# Patient Record
Sex: MALE | Race: WHITE | NOT HISPANIC OR LATINO | Employment: OTHER | URBAN - METROPOLITAN AREA
[De-identification: names, ages, dates, MRNs, and addresses within clinical notes are randomized per-mention and may not be internally consistent; named-entity substitution may affect disease eponyms.]

---

## 2017-01-03 RX ORDER — IBUPROFEN 600 MG/1
600 TABLET ORAL EVERY 8 HOURS PRN
Qty: 90 TAB | Refills: 2 | Status: SHIPPED | OUTPATIENT
Start: 2017-01-03 | End: 2017-03-30 | Stop reason: SDUPTHER

## 2017-01-03 RX ORDER — IBUPROFEN 800 MG/1
TABLET ORAL
Qty: 90 TAB | Refills: 0 | OUTPATIENT
Start: 2017-01-03

## 2017-01-30 RX ORDER — IBUPROFEN 800 MG/1
TABLET ORAL
Refills: 0 | OUTPATIENT
Start: 2017-01-30

## 2017-02-28 RX ORDER — TEMAZEPAM 30 MG/1
CAPSULE ORAL
Qty: 30 CAP | Refills: 0 | Status: SHIPPED
Start: 2017-02-28 | End: 2017-04-01 | Stop reason: SDUPTHER

## 2017-02-28 NOTE — TELEPHONE ENCOUNTER
Covering for Krishan Fernandez this week. Received refill request for temazepam and tylenol #4.  Pt was seen in clinic recently and is taking medications for insomnia and chronic pain. Refill sent to pharmacy for one month only to last until patient has appointment with PCP Krishan Fernandez to assess for tolerance with these medications.     Lit Arthur M.D.

## 2017-03-16 LAB
CHOLEST SERPL-MCNC: 158 MG/DL (ref 100–199)
COMMENT 011824: NORMAL
HDLC SERPL-MCNC: 47 MG/DL
LDLC SERPL CALC-MCNC: 81 MG/DL (ref 0–99)
TRIGL SERPL-MCNC: 148 MG/DL (ref 0–149)
VLDLC SERPL CALC-MCNC: 30 MG/DL (ref 5–40)

## 2017-03-17 LAB — HEMOCCULT STL QL IA: POSITIVE

## 2017-03-20 DIAGNOSIS — R19.5 POSITIVE FIT (FECAL IMMUNOCHEMICAL TEST): ICD-10-CM

## 2017-03-22 ENCOUNTER — HOSPITAL ENCOUNTER (OUTPATIENT)
Dept: RADIOLOGY | Facility: MEDICAL CENTER | Age: 57
End: 2017-03-22
Attending: NURSE PRACTITIONER
Payer: MEDICARE

## 2017-03-22 ENCOUNTER — OFFICE VISIT (OUTPATIENT)
Dept: MEDICAL GROUP | Facility: PHYSICIAN GROUP | Age: 57
End: 2017-03-22
Payer: MEDICARE

## 2017-03-22 VITALS
HEIGHT: 70 IN | SYSTOLIC BLOOD PRESSURE: 148 MMHG | BODY MASS INDEX: 33.5 KG/M2 | TEMPERATURE: 97.2 F | HEART RATE: 91 BPM | WEIGHT: 234 LBS | RESPIRATION RATE: 16 BRPM | DIASTOLIC BLOOD PRESSURE: 82 MMHG | OXYGEN SATURATION: 97 %

## 2017-03-22 DIAGNOSIS — G89.29 CHRONIC MIDLINE LOW BACK PAIN WITHOUT SCIATICA: ICD-10-CM

## 2017-03-22 DIAGNOSIS — M54.50 CHRONIC MIDLINE LOW BACK PAIN WITHOUT SCIATICA: ICD-10-CM

## 2017-03-22 DIAGNOSIS — E66.9 OBESITY (BMI 30.0-34.9): ICD-10-CM

## 2017-03-22 DIAGNOSIS — Z79.891 ENCOUNTER FOR LONG-TERM OPIATE ANALGESIC USE: ICD-10-CM

## 2017-03-22 DIAGNOSIS — Z79.891 CHRONIC USE OF OPIATE DRUGS THERAPEUTIC PURPOSES: ICD-10-CM

## 2017-03-22 PROCEDURE — G8484 FLU IMMUNIZE NO ADMIN: HCPCS | Performed by: NURSE PRACTITIONER

## 2017-03-22 PROCEDURE — 4004F PT TOBACCO SCREEN RCVD TLK: CPT | Performed by: NURSE PRACTITIONER

## 2017-03-22 PROCEDURE — G8432 DEP SCR NOT DOC, RNG: HCPCS | Performed by: NURSE PRACTITIONER

## 2017-03-22 PROCEDURE — G8419 CALC BMI OUT NRM PARAM NOF/U: HCPCS | Performed by: NURSE PRACTITIONER

## 2017-03-22 PROCEDURE — 72100 X-RAY EXAM L-S SPINE 2/3 VWS: CPT

## 2017-03-22 PROCEDURE — 72202 X-RAY EXAM SI JOINTS 3/> VWS: CPT

## 2017-03-22 PROCEDURE — 99214 OFFICE O/P EST MOD 30 MIN: CPT | Performed by: NURSE PRACTITIONER

## 2017-03-22 RX ORDER — HYDROCODONE BITARTRATE AND ACETAMINOPHEN 7.5; 325 MG/1; MG/1
1 TABLET ORAL EVERY 8 HOURS PRN
Qty: 90 TAB | Refills: 0 | Status: SHIPPED | OUTPATIENT
Start: 2017-03-22 | End: 2017-04-18

## 2017-03-22 ASSESSMENT — LIFESTYLE VARIABLES: HISTORY_ALCOHOL_USE: 0

## 2017-03-22 ASSESSMENT — ENCOUNTER SYMPTOMS: DEPRESSION: 0

## 2017-03-22 NOTE — ASSESSMENT & PLAN NOTE
Last dose of narcotic medication: Tylenol #4 last dose was 3-4 days ago. He ran out because he was taking 2 tabs sometimes due to his increasing pain. States a friend gave him oxycodone and he has been taking this for two days.     Analgesia: 5-7/10  Activity level: good  Adverse events: none  Aberrant behavior: none  Affect and mood: calm and pleasant    Nonnarcotic treatments that are being used: NSAIDs, ibuprofen   Pain management agreement initiated and signed on:  Most recent urine drug screen done never    Opiate risk score: 7    Total daily opiate dosage: morphine 36 mEq

## 2017-03-22 NOTE — PROGRESS NOTES
Subjective:     Chief Complaint   Patient presents with   • Follow-Up     4 months       HPI  Andrews Sanchez is a 56 y.o. male here today for routine f/u on chronic pain management. He is concerned today about worsening pain.   Chronic low back pain  Long-standing problem with history of back surgery. It is getting worse over this past two months. He reports that he believes that a screw is becoming loose and feels like it is going to poke through his flesh around the site of surgery. He believes that this was aggravated by walking up and down stairs with moving. Denies any bowel or bladder changes. Denies any fevers, chills, or weight loss. No LE weakness, numbness, or tingling. Treatments tried include him deciding to increase his pain medication without discussion with myself and taking a friend's pain medication for the past 2 days    Chronic use of opiate drugs therapeutic purposes  Last dose of narcotic medication: Tylenol #4 last dose was 3-4 days ago. He ran out because he was taking 2 tabs sometimes due to his increasing pain. States a friend gave him oxycodone and he has been taking this for two days.     Analgesia: 5-7/10  Activity level: good  Adverse events: none  Aberrant behavior: none  Affect and mood: calm and pleasant    Nonnarcotic treatments that are being used: NSAIDs, ibuprofen   Pain management agreement initiated and signed on:  Most recent urine drug screen done never    Opiate risk score: 7    Total daily opiate dosage: morphine 36 mEq        Obesity (BMI 30.0-34.9)  Patient is very active and rides his bicycle around town or walks for exercise. States that he is very healthy for the most part with a diet high in fruits, vegetables, fish, and poultry. Reports occasionally eating out.         Diagnoses of Chronic midline low back pain without sciatica, Chronic use of opiate drugs therapeutic purposes, Encounter for long-term opiate analgesic use, and Obesity (BMI 30.0-34.9) were  "pertinent to this visit.    Allergies: Review of patient's allergies indicates no known allergies.  Current medicines (including changes today)  Current Outpatient Prescriptions   Medication Sig Dispense Refill   • [START ON 3/28/2017] acetaminophen-codeine #4 (TYLENOL #4) 300-60 MG Tab Take 1 Tab by mouth every 6 hours as needed. 120 Tab 2   • hydrocodone-acetaminophen (NORCO) 7.5-325 MG per tablet Take 1 Tab by mouth every 8 hours as needed. 90 Tab 0   • temazepam (RESTORIL) 30 MG capsule NOT COVERED TAKE 1 CAPSULE EVERY DAY AT BEDTIME AS NEEDED FOR SLEEP (DISCONTINUE AMBIEN) 30 Cap 0   • ibuprofen (MOTRIN) 600 MG Tab Take 1 Tab by mouth every 8 hours as needed for Inflammation. 90 Tab 2   • simvastatin (ZOCOR) 40 MG Tab Take 1 Tab by mouth every evening. 90 Tab 3   • amlodipine (NORVASC) 5 MG Tab Take 1 Tab by mouth every day. 90 Tab 3     No current facility-administered medications for this visit.       He  has a past medical history of Hypercholesteremia; Hypertension; and Muscle disorder.      ROS  As stated in HPI      Objective:     Blood pressure 148/82, pulse 91, temperature 36.2 °C (97.2 °F), resp. rate 16, height 1.778 m (5' 10\"), weight 106.142 kg (234 lb), SpO2 97 %. Body mass index is 33.58 kg/(m^2).  Physical Exam:  General: Alert, oriented, in no acute distress.  Eye contact is good, speech goal directed, affect talkative but calm  HEENT: pupils equally round and 2+mm in size   Spine without deformity and no significant curvature on forward bend. Thoracic and lumbar paraspinous muscles without tenderness or spasm. No spinous process tenderness. Positive SI joint tenderness on right side. Full hip ROM bilat. SLT negative. DTR 1+ patella, 1+ achilles. Strength 5/5 prox and distal LE. Sensation intact to light touch and pin prick. Able to perform flexion, extension, and lateral bend without difficulty. Gait steady.   Ext: no edema, normal color and temperature.     3/22/2017 2:13 PM    HISTORY/REASON " FOR EXAM:  Low back pain for one month.      TECHNIQUE/ EXAM DESCRIPTION AND NUMBER OF VIEWS:  3 views of the lumbar spine.    COMPARISON: Intraoperative views of the lumbar spine 3/7/2008    FINDINGS:  There is minimal upper lumbar scoliosis convex right.  Posterior fusion hardware with bilateral pedicular screws, paired posterior rods, and one posterior transverse bar are present from L4 through S1.  There are old appearing anterior wedge compression deformities from T10 through T12. There is diffuse disc space narrowing with vacuum disc phenomenon at T12-L1.  There is diffuse disc space narrowing at L4-5.    There is no acute abnormality.  There is symmetric degenerative change of each SI joint.              Impression             1.  Posterior fusion hardware in place from L4 through S1 with bilateral laminectomy defects at L4 and L5.    2.  No acute compression fracture.    3.  Degenerative change of the T12-L1 disc.           3/22/2017 2:14 PM    HISTORY/REASON FOR EXAM:  Low back pain for one month.      TECHNIQUE/EXAM DESCRIPTION AND NUMBER OF VIEWS:  3 view(s) of the sacroiliac joints.    COMPARISON: Lumbar spine series same date    FINDINGS:  There are no fractures or dislocations.  No blastic or lytic lesions.  The sacral neural arches are intact.  There is symmetric degenerative change of each SI joint.  There is no evidence of sacroiliitis.              Impression             Mild symmetric degenerative change of each SI joint.  No acute sacral fracture identified               Assessment and Plan:   The following treatment plan was discussed  1. Chronic midline low back pain without sciatica  Not controlled at this time. We will obtain x-rays to evaluate the spine and postsurgical hardware. I will increase patient's pain medication temporarily until we determine the cause of the worsening pain. After this for weeks, we will go back to Tylenol No. 4 for chronic pain management     DX-LUMBAR SPINE-2 OR 3  VIEWS    DX-SACROILIAC JOINTS 3+    hydrocodone-acetaminophen (NORCO) 7.5-325 MG per tablet   2. Chronic use of opiate drugs therapeutic purposes  UDS provided today. We will need to repeat this before any further pain medication is prescribed as I had to review the pain contract with patient again today in regards to using someone else's narcotics.  Obtained and reviewed the patient utilization report state pharmacy database on 3/22/2017.  Based on assessment of report prescription for Norco is medically necessary.   [facility] PAIN MANAGEMENT SCREEN   3. Encounter for long-term opiate analgesic use  Same as #2   4. Obesity (BMI 30.0-34.9)  Patient identified as having weight management issue.  Appropriate orders and counseling given.       Followup: Return in about 3 months (around 6/22/2017) for short pain med refill. sooner should new symptoms or problems arise.

## 2017-03-22 NOTE — ASSESSMENT & PLAN NOTE
Long-standing problem with history of back surgery. It is getting worse over this past two months. He reports that he believes that a screw is becoming loose and feels like it is going to poke through his flesh around the site of surgery. He believes that this was aggravated by walking up and down stairs with moving. Denies any bowel or bladder changes. Denies any fevers, chills, or weight loss. No LE weakness, numbness, or tingling. Treatments tried include him deciding to increase his pain medication without discussion with myself and taking a friend's pain medication for the past 2 days

## 2017-03-23 NOTE — ASSESSMENT & PLAN NOTE
Patient is very active and rides his bicycle around town or walks for exercise. States that he is very healthy for the most part with a diet high in fruits, vegetables, fish, and poultry. Reports occasionally eating out.

## 2017-03-30 RX ORDER — IBUPROFEN 600 MG/1
TABLET ORAL
Qty: 90 TAB | Refills: 2 | Status: SHIPPED | OUTPATIENT
Start: 2017-03-30 | End: 2017-05-18 | Stop reason: SDUPTHER

## 2017-03-30 RX ORDER — TEMAZEPAM 30 MG/1
CAPSULE ORAL
Qty: 30 CAP | Refills: 0 | OUTPATIENT
Start: 2017-03-30

## 2017-04-04 ENCOUNTER — TELEPHONE (OUTPATIENT)
Dept: MEDICAL GROUP | Facility: PHYSICIAN GROUP | Age: 57
End: 2017-04-04

## 2017-04-04 RX ORDER — TEMAZEPAM 30 MG/1
CAPSULE ORAL
Qty: 30 CAP | Refills: 0 | Status: SHIPPED
Start: 2017-04-04 | End: 2017-05-12 | Stop reason: SDUPTHER

## 2017-04-04 NOTE — TELEPHONE ENCOUNTER
Pt. Notified.  He will come prior to his next fill for new UDS.  He is needing his Temazepam refilled please.

## 2017-04-04 NOTE — TELEPHONE ENCOUNTER
Pt states that codeine works much better than Norco.  He is wanting to change back to that one.  Please advise.

## 2017-04-04 NOTE — TELEPHONE ENCOUNTER
Please let patient know that I am happy to hear that Tylenol No. 4 works better than [place], but his drug screen was very inconsistent. I am aware that he had used a friend's oxycodone, therefore we need a repeat drug screen for this purpose as well as there was positive illicit substance. Patient needs to come to clinic today or tomorrow for urine drug screen before any further medication can be prescribed.    GHADA Woods.

## 2017-04-05 NOTE — TELEPHONE ENCOUNTER
Please let patient know I have sent over temazepam. If he is taking Sudafed routinely, please clarify whether he is using it daily, and when his last dose was (also add to med list then) so that we can be sure to have this information for drug screen as Sudafed can cause positive amphetamine.    GHADA Woods.

## 2017-04-14 DIAGNOSIS — G89.29 CHRONIC MIDLINE LOW BACK PAIN WITHOUT SCIATICA: ICD-10-CM

## 2017-04-14 DIAGNOSIS — M54.50 CHRONIC MIDLINE LOW BACK PAIN WITHOUT SCIATICA: ICD-10-CM

## 2017-04-14 NOTE — TELEPHONE ENCOUNTER
Was the patient seen in the last year in this department? Yes     Does patient have an active prescription for medications requested? No     Received Request Via: Patient     I've

## 2017-04-17 NOTE — TELEPHONE ENCOUNTER
Was the patient seen in the last year in this department? Yes  3/22/17    Does patient have an active prescription for medications requested? No     Received Request Via: Pharmacy

## 2017-04-18 RX ORDER — TEMAZEPAM 30 MG/1
CAPSULE ORAL
Qty: 30 CAP | Refills: 0
Start: 2017-04-18

## 2017-04-18 NOTE — TELEPHONE ENCOUNTER
Called Rx into pharmacy   prescription called into   [id number] - [place], [place] - [address] AT [place] [address] [place] [address]  Phone: [phone] Fax: [phone]

## 2017-04-30 RX ORDER — TEMAZEPAM 30 MG/1
CAPSULE ORAL
Qty: 30 CAP | Refills: 0
Start: 2017-04-30

## 2017-05-12 RX ORDER — TEMAZEPAM 30 MG/1
CAPSULE ORAL
Qty: 30 CAP | Refills: 0 | Status: SHIPPED
Start: 2017-05-12 | End: 2017-07-18

## 2017-05-18 DIAGNOSIS — I10 ESSENTIAL HYPERTENSION: ICD-10-CM

## 2017-05-18 RX ORDER — AMLODIPINE BESYLATE 5 MG/1
5 TABLET ORAL DAILY
Qty: 90 TAB | Refills: 3 | Status: SHIPPED | OUTPATIENT
Start: 2017-05-18

## 2017-05-18 RX ORDER — IBUPROFEN 600 MG/1
TABLET ORAL
Qty: 90 TAB | Refills: 2 | Status: SHIPPED | OUTPATIENT
Start: 2017-05-18 | End: 2017-06-14 | Stop reason: SDUPTHER

## 2017-06-14 RX ORDER — IBUPROFEN 600 MG/1
TABLET ORAL
Qty: 90 TAB | Refills: 2 | Status: SHIPPED | OUTPATIENT
Start: 2017-06-14 | End: 2017-11-05 | Stop reason: SDUPTHER

## 2017-06-14 RX ORDER — TEMAZEPAM 30 MG/1
CAPSULE ORAL
Qty: 30 CAP | Refills: 0
Start: 2017-06-14

## 2017-06-14 NOTE — TELEPHONE ENCOUNTER
Please inform patient that I have received a refill request for his pain medication and sleep aid. His most recent drug screen showed positive for methamphetamine. We send this out for them to test whether this was from Sudafed or from an illicit substance, and it shows positive for illicit substance. Drug screen also showed that patient had taken some form of oxycodone, which has never been prescribed to him. Both of these go against a pain contract. The patient would like any more refills for medications, he needs an appointment to discuss his urine drug screen results to come up with plan.    GHADA Woods.

## 2017-07-18 ENCOUNTER — OFFICE VISIT (OUTPATIENT)
Dept: MEDICAL GROUP | Facility: PHYSICIAN GROUP | Age: 57
End: 2017-07-18
Payer: MEDICARE

## 2017-07-18 VITALS
RESPIRATION RATE: 16 BRPM | DIASTOLIC BLOOD PRESSURE: 78 MMHG | HEIGHT: 70 IN | OXYGEN SATURATION: 94 % | SYSTOLIC BLOOD PRESSURE: 130 MMHG | HEART RATE: 125 BPM | BODY MASS INDEX: 32.35 KG/M2 | TEMPERATURE: 98.4 F | WEIGHT: 226 LBS

## 2017-07-18 DIAGNOSIS — Z79.891 CHRONIC USE OF OPIATE DRUGS THERAPEUTIC PURPOSES: ICD-10-CM

## 2017-07-18 DIAGNOSIS — M54.50 CHRONIC MIDLINE LOW BACK PAIN WITHOUT SCIATICA: ICD-10-CM

## 2017-07-18 DIAGNOSIS — R82.5 POSITIVE URINE DRUG SCREEN: ICD-10-CM

## 2017-07-18 DIAGNOSIS — E78.2 MIXED HYPERLIPIDEMIA: ICD-10-CM

## 2017-07-18 DIAGNOSIS — I10 ESSENTIAL HYPERTENSION: ICD-10-CM

## 2017-07-18 DIAGNOSIS — R19.5 OCCULT BLOOD POSITIVE STOOL: ICD-10-CM

## 2017-07-18 DIAGNOSIS — G89.29 CHRONIC MIDLINE LOW BACK PAIN WITHOUT SCIATICA: ICD-10-CM

## 2017-07-18 PROCEDURE — 99213 OFFICE O/P EST LOW 20 MIN: CPT | Performed by: NURSE PRACTITIONER

## 2017-07-18 RX ORDER — OXYCODONE AND ACETAMINOPHEN 10; 325 MG/1; MG/1
1-2 TABLET ORAL EVERY 4 HOURS PRN
COMMUNITY
End: 2018-01-11

## 2017-07-18 RX ORDER — PSEUDOEPHEDRINE HCL 30 MG
60 TABLET ORAL EVERY 4 HOURS PRN
COMMUNITY
End: 2018-01-11

## 2017-07-18 RX ORDER — ACETAMINOPHEN 500 MG
500-1000 TABLET ORAL EVERY 6 HOURS PRN
COMMUNITY

## 2017-07-18 RX ORDER — NAPROXEN SODIUM 220 MG
220 TABLET ORAL 2 TIMES DAILY WITH MEALS
COMMUNITY
End: 2017-07-18

## 2017-07-18 ASSESSMENT — PATIENT HEALTH QUESTIONNAIRE - PHQ9
5. POOR APPETITE OR OVEREATING: 0 - NOT AT ALL
SUM OF ALL RESPONSES TO PHQ QUESTIONS 1-9: 9
CLINICAL INTERPRETATION OF PHQ2 SCORE: 3

## 2017-07-18 ASSESSMENT — PAIN SCALES - GENERAL: PAINLEVEL: 8=MODERATE-SEVERE PAIN

## 2017-07-18 NOTE — ASSESSMENT & PLAN NOTE
Last dose of narcotic medication: Tylenol #4 last 7 weeks ago, Percocet this morning, states Sudafed last dose Sunday, last dose of Restoril 7 weeks ago, last dose of Morphine 4 days ago. He also just saw the dentist 7/13/17 and was put to sleep with (Fentanyl, Midazolam, Ketamine) cocktail.     Aberrant behavior: Continues to tell me that he is taking Sudafed and not using illicit substance methamphetamine  Affect and mood: Talkative, slightly anxious, but pleasant    Nonnarcotic treatments that are being used: NSAIDs and exercise  Pain management agreement initiated and signed on: June 2016  Most recent urine drug screen done May 2017, and is not consistent with prescribed medications; inconsistent with oxycodone and positive for methamphetamine

## 2017-07-18 NOTE — ASSESSMENT & PLAN NOTE
Long-standing problem with history of back surgery that has progressively become worse. Patient states that he feels a screw is pressing on him in his low back, and this was previously managed with Tylenol No. 4, but unfortunately a urine drug screen came out positive for methamphetamine so I have not been providing patient with pain management. He states muscle relaxers do not work. Denies any bowel or bladder changes, fever, chills

## 2017-07-18 NOTE — ASSESSMENT & PLAN NOTE
Chronic problem well controlled on current medications. Does not monitor blood pressure at home. Denies any severe headache, dizziness, vision changes, chest pain, [place], palpitations, or lower extremity edema

## 2017-07-18 NOTE — MR AVS SNAPSHOT
"        Andrews Sanchez   2017 12:00 PM   Office Visit   MRN: [id number]    Department:  [facility]   Dept Phone:  [phone]    Description:  Male : 1960   Provider:  TONIA Woods           Reason for Visit     Follow-Up 4 months       Allergies as of 2017     No Known Allergies      You were diagnosed with     Chronic midline low back pain without sciatica   [5177940]       Chronic use of opiate drugs therapeutic purposes   [5899998]       Essential hypertension   [3025597]       Mixed hyperlipidemia   [272.2.ICD-9-CM]       Occult blood positive stool   [939852]         Vital Signs     Blood Pressure Pulse Temperature Respirations Height Weight    130/78 mmHg 125 36.9 °C (98.4 °F) 16 1.778 m (5' 10\") 102.513 kg (226 lb)    Body Mass Index Oxygen Saturation Smoking Status             32.43 kg/m2 94% Current Some Day Smoker         Basic Information     Date Of Birth Sex Race Ethnicity Preferred Language    1960 Male White Non- English      Your appointments     2017 12:00 PM   Established Patient with TONIA Woods   [facility]    [address] [place] [id number] [phone]           You will be receiving a confirmation call a few days before your appointment from our automated call confirmation system.              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 Review all of your home medications and newly ordered medications with your provider and/or pharmacist. Follow medication instructions as directed by your provider and/or pharmacist. Please keep your medication list with you and share with your provider. Update the information when medications are discontinued, doses are changed, or new medications (including over-the-counter products) are added; and carry medication information at all times in the event of emergency situations     Allergies:  No Known Allergies          Medications  Valid as of: July 18, 2017 - 12:56 PM    Generic Name Brand Name Tablet Size Instructions for use    Acetaminophen (Tab) TYLENOL 500 MG Take 500-1,000 mg by mouth every 6 hours as needed.        AmLODIPine Besylate (Tab) NORVASC 5 MG Take 1 Tab by mouth every day.        Ibuprofen (Tab) MOTRIN 600 MG TAKE 1 TABLET BY MOUTH EVERY 8 HOURS AS NEEDED FOR INFLAMMATION.        Oxycodone-Acetaminophen (Tab) PERCOCET-10  MG Take 1-2 Tabs by mouth every four hours as needed for Severe Pain.        Pseudoephedrine HCl (Tab) SUDAFED 30 MG Take 60 mg by mouth every four hours as needed for Congestion.        Simvastatin (Tab) ZOCOR 40 MG Take 1 Tab by mouth every evening.        .                 Medicines prescribed today were sent to:     [id number] - [place] - [address] AT [address] [place] [address]    Phone: [phone] Fax: [phone]    Open 24 Hours?: No      Medication refill instructions:       If your prescription  bottle indicates you have medication refills left, it is not necessary to call your provider’s office. Please contact your pharmacy and they will refill your medication.    If your prescription bottle indicates you do not have any refills left, you may request refills at any time through one of the following ways: The online [facility] system (except Urgent Care), by calling your provider’s office, or by asking your pharmacy to contact your provider’s office with a refill request. Medication refills are processed only during regular business hours and may not be available until the next business day. Your provider may request additional information or to have a follow-up visit with you prior to refilling your medication.   *Please Note: Medication refills are assigned a new Rx number when refilled electronically. Your pharmacy may indicate that no refills were authorized even though a new prescription for the same medication is available at the pharmacy. Please request the medicine by name with the pharmacy before contacting your provider for a refill.        Your To Do List     Future Labs/Procedures Complete By Angella Joy PAIN MANAGEMENT SCREEN  As directed 7/18/2018    Comments:    Current Meds (name, sig, last dose):   Current outpatient prescriptions:   •  acetaminophen, 500-1,000 mg, Oral, Q6HRS PRN  •  naproxen, 220 mg, Oral, BID WITH MEALS  •  oxycodone-acetaminophen, 1-2 Tab, Oral, Q4HRS PRN  •  pseudoephedrine, 60 mg, Oral, Q4HRS PRN  •  ibuprofen, TAKE 1 TABLET BY MOUTH EVERY 8 HOURS AS NEEDED FOR INFLAMMATION., 7/18/2017  •  amlodipine, 5 mg, Oral, DAILY, 7/18/2017  •  temazepam, TAKE 1 CAPSULE BY MOUTH ONCE DAILY AT BEDTIME AS NEEDED FOR SLEEP, 7/18/2017  •  simvastatin, 40 mg, Oral, Q EVENING, 7/18/2017  •  acetaminophen-codeine #4, TAKE 1 TABLET BY MOUTH EVERY 6 HOURS AS NEEDED, not taking          OCCULT BLOOD FECES IMMUNOASSAY  As directed 7/18/2018         [facility] Access Code: Activation  code not generated  Current MyChart Status: Active          Quit Tobacco Information     Do you want to quit using tobacco?    Quitting tobacco decreases risks of cancer, heart and lung disease, increases life expectancy, improves sense of taste and smell, and increases spending money, among other benefits.    If you are thinking about quitting, we can help.  • [facility] Quit Tobacco Program: [phone]  o Program occurs weekly for four weeks and includes pharmacist consultation on products to support quitting smoking or chewing tobacco. A provider referral is needed for pharmacist consultation.  • Tobacco Users Help Hotline: 0-296-QUIT-NOW (403-9544) or https://[place].quitlogix.org/  o Free, confidential telephone and online coaching for [place] residents. Sessions are designed on a schedule that is convenient for you. Eligible clients receive free nicotine replacement therapy.  • Nationally: www.smokefree.gov  o Information and professional assistance to support both immediate and long-term needs as you become, and remain, a non-smoker. Smokefree.gov allows you to choose the help that best fits your needs.

## 2017-09-15 ENCOUNTER — HOSPITAL ENCOUNTER (EMERGENCY)
Facility: MEDICAL CENTER | Age: 57
End: 2017-09-15
Attending: EMERGENCY MEDICINE
Payer: MEDICARE

## 2017-09-15 VITALS
WEIGHT: 225 LBS | BODY MASS INDEX: 32.21 KG/M2 | SYSTOLIC BLOOD PRESSURE: 146 MMHG | RESPIRATION RATE: 17 BRPM | OXYGEN SATURATION: 96 % | TEMPERATURE: 97.3 F | DIASTOLIC BLOOD PRESSURE: 84 MMHG | HEIGHT: 70 IN | HEART RATE: 86 BPM

## 2017-09-15 DIAGNOSIS — T78.40XA ALLERGIC REACTION, INITIAL ENCOUNTER: ICD-10-CM

## 2017-09-15 PROCEDURE — 96372 THER/PROPH/DIAG INJ SC/IM: CPT

## 2017-09-15 PROCEDURE — 700111 HCHG RX REV CODE 636 W/ 250 OVERRIDE (IP): Performed by: EMERGENCY MEDICINE

## 2017-09-15 PROCEDURE — 700105 HCHG RX REV CODE 258: Performed by: EMERGENCY MEDICINE

## 2017-09-15 PROCEDURE — 36415 COLL VENOUS BLD VENIPUNCTURE: CPT

## 2017-09-15 PROCEDURE — 99284 EMERGENCY DEPT VISIT MOD MDM: CPT

## 2017-09-15 PROCEDURE — 94760 N-INVAS EAR/PLS OXIMETRY 1: CPT

## 2017-09-15 PROCEDURE — 96374 THER/PROPH/DIAG INJ IV PUSH: CPT

## 2017-09-15 PROCEDURE — 96361 HYDRATE IV INFUSION ADD-ON: CPT

## 2017-09-15 PROCEDURE — 96375 TX/PRO/DX INJ NEW DRUG ADDON: CPT

## 2017-09-15 PROCEDURE — 700111 HCHG RX REV CODE 636 W/ 250 OVERRIDE (IP)

## 2017-09-15 RX ORDER — ONDANSETRON 2 MG/ML
4 INJECTION INTRAMUSCULAR; INTRAVENOUS ONCE
Status: DISCONTINUED | OUTPATIENT
Start: 2017-09-15 | End: 2017-09-15 | Stop reason: HOSPADM

## 2017-09-15 RX ORDER — EPINEPHRINE 0.3 MG/.3ML
0.3 INJECTION SUBCUTANEOUS ONCE
Qty: 0.3 ML | Refills: 3 | Status: SHIPPED | OUTPATIENT
Start: 2017-09-15 | End: 2017-09-15

## 2017-09-15 RX ORDER — DIPHENHYDRAMINE HYDROCHLORIDE 50 MG/ML
50 INJECTION INTRAMUSCULAR; INTRAVENOUS ONCE
Status: COMPLETED | OUTPATIENT
Start: 2017-09-15 | End: 2017-09-15

## 2017-09-15 RX ORDER — PREDNISONE 10 MG/1
TABLET ORAL
Qty: 32 TAB | Refills: 0 | Status: SHIPPED | OUTPATIENT
Start: 2017-09-15 | End: 2018-01-11

## 2017-09-15 RX ORDER — DIPHENHYDRAMINE HCL 25 MG
25 CAPSULE ORAL EVERY 6 HOURS PRN
Qty: 30 CAP | Refills: 0 | Status: SHIPPED | OUTPATIENT
Start: 2017-09-15 | End: 2018-01-11

## 2017-09-15 RX ORDER — EPINEPHRINE 1 MG/ML
0.5 INJECTION INTRAMUSCULAR; INTRAVENOUS; SUBCUTANEOUS ONCE
Status: COMPLETED | OUTPATIENT
Start: 2017-09-15 | End: 2017-09-15

## 2017-09-15 RX ORDER — ONDANSETRON 2 MG/ML
4 INJECTION INTRAMUSCULAR; INTRAVENOUS ONCE
Status: COMPLETED | OUTPATIENT
Start: 2017-09-15 | End: 2017-09-15

## 2017-09-15 RX ORDER — EPINEPHRINE 1 MG/ML
INJECTION INTRAMUSCULAR; INTRAVENOUS; SUBCUTANEOUS
Status: COMPLETED
Start: 2017-09-15 | End: 2017-09-15

## 2017-09-15 RX ORDER — SODIUM CHLORIDE 9 MG/ML
1000 INJECTION, SOLUTION INTRAVENOUS ONCE
Status: COMPLETED | OUTPATIENT
Start: 2017-09-15 | End: 2017-09-15

## 2017-09-15 RX ORDER — EPINEPHRINE 1 MG/ML(1)
0.5 AMPUL (ML) INJECTION ONCE
Status: DISCONTINUED | OUTPATIENT
Start: 2017-09-15 | End: 2017-09-15

## 2017-09-15 RX ORDER — FAMOTIDINE 20 MG/1
20 TABLET, FILM COATED ORAL 2 TIMES DAILY
Qty: 20 TAB | Refills: 0 | Status: SHIPPED | OUTPATIENT
Start: 2017-09-15 | End: 2017-09-25

## 2017-09-15 RX ORDER — METHYLPREDNISOLONE SODIUM SUCCINATE 125 MG/2ML
125 INJECTION, POWDER, LYOPHILIZED, FOR SOLUTION INTRAMUSCULAR; INTRAVENOUS ONCE
Status: COMPLETED | OUTPATIENT
Start: 2017-09-15 | End: 2017-09-15

## 2017-09-15 RX ADMIN — DIPHENHYDRAMINE HYDROCHLORIDE 50 MG: 50 INJECTION, SOLUTION INTRAMUSCULAR; INTRAVENOUS at 08:55

## 2017-09-15 RX ADMIN — SODIUM CHLORIDE 1000 ML: 9 INJECTION, SOLUTION INTRAVENOUS at 08:56

## 2017-09-15 RX ADMIN — ONDANSETRON 4 MG: 2 INJECTION INTRAMUSCULAR; INTRAVENOUS at 09:00

## 2017-09-15 RX ADMIN — FAMOTIDINE 20 MG: 10 INJECTION INTRAVENOUS at 09:00

## 2017-09-15 RX ADMIN — EPINEPHRINE 0.5 MG: 1 INJECTION INTRAMUSCULAR; INTRAVENOUS; SUBCUTANEOUS at 09:05

## 2017-09-15 RX ADMIN — EPINEPHRINE 0.5 MG: 1 INJECTION INTRAMUSCULAR; INTRAVENOUS; SUBCUTANEOUS at 09:15

## 2017-09-15 RX ADMIN — METHYLPREDNISOLONE SODIUM SUCCINATE 125 MG: 125 INJECTION, POWDER, FOR SOLUTION INTRAMUSCULAR; INTRAVENOUS at 08:56

## 2017-09-15 NOTE — ED PROVIDER NOTES
ED Provider Note    Scribed for Maribell Huizar M.D. by Tam Valle. 9/15/2017  8:49 AM    Primary care provider: TONIA Woods  Means of arrival: Ambulance  History obtained from: Patient  History limited by: None    CHIEF COMPLAINT  Allergic Reaction     HPI  Andrews Sanchez is a 57 y.o. male with a history of hypertension who presents to the Emergency Department for acute onset allergic reaction, onset one hour prior to arrival in the  ED. The patient reports that a wasp bit his lip. His lips then suddenly swelled and he developed diffuse hives. The patient reports slight trouble breathing. He has not taken any medications for his symptoms. He denies any vomiting. Associated symptoms include nausea.     REVIEW OF SYSTEMS  HEENT:  No ear pain, congestion, or sore throat. Positive lip swelling, lip pain, and facial swelling.   EYES: no discharge, redness, or vision changes  CARDIAC: no chest pain, no palpitations    PULMONARY: Positive shortness of breath.   GI: Positive nausea, no abdominal pain.   Neuro: no weakness or headache  Musculoskeletal: no swelling, deformity, pain, or joint swelling  Endocrine: no fevers, sweating, or weight loss   SKIN: positive rash, erythema,no contusions     See history of present illness. All other systems are negative  C.     PAST MEDICAL HISTORY   has a past medical history of Hypercholesteremia; Hypertension; and Muscle disorder.    SURGICAL HISTORY   has a past surgical history that includes laminotomy (1990).    SOCIAL HISTORY  Social History   Substance Use Topics   • Smoking status: Current Some Day Smoker     Packs/day: 0.20     Years: 16.00     Types: Cigarettes   • Smokeless tobacco: Never Used      Comment: smokes 4-5 cigs/day    • Alcohol use No      History   Drug Use No     FAMILY HISTORY  Family History   Problem Relation Age of Onset   • Other Mother      heart stopped from morphine   • Hypertension Father    • Other Father      aortic aneurysm    • Lung  "Disease Father      smoker     CURRENT MEDICATIONS  No current facility-administered medications on file prior to encounter.      Current Outpatient Prescriptions on File Prior to Encounter   Medication Sig Dispense Refill   • acetaminophen (TYLENOL) 500 MG Tab Take 500-1,000 mg by mouth every 6 hours as needed.     • oxycodone-acetaminophen (PERCOCET-10)  MG Tab Take 1-2 Tabs by mouth every four hours as needed for Severe Pain.     • pseudoephedrine (SUDAFED) 30 MG Tab Take 60 mg by mouth every four hours as needed for Congestion.     • ibuprofen (MOTRIN) 600 MG Tab TAKE 1 TABLET BY MOUTH EVERY 8 HOURS AS NEEDED FOR INFLAMMATION. 90 Tab 2   • amlodipine (NORVASC) 5 MG Tab Take 1 Tab by mouth every day. 90 Tab 3   • simvastatin (ZOCOR) 40 MG Tab Take 1 Tab by mouth every evening. 90 Tab 3     ALLERGIES  No Known Allergies    PHYSICAL EXAM  VITAL SIGNS: /84   Pulse (!) 120   Temp 36.3 °C (97.3 °F) (Temporal)   Resp 18   Ht 1.778 m (5' 10\")   Wt 102.1 kg (225 lb)   SpO2 97%   BMI 32.28 kg/m²     Constitutional: Well developed, Well nourished, uncomfortable Non-toxic appearance.   HEENT: Atraumatic, external ears normal, pharynx pink,  Mucous membranes moist, No rhinorrhea. Marked Lower lip swelling noted.no swelling at base of tongue  Eyes: PERRL, EOMI, Conjunctiva normal, No discharge.   Neck: Normal range of motion, No tenderness, Supple, No stridor.   Lymphatic: No lymphadenopathy    Cardiovascular: Regular Rate and Rhythm, No murmurs,  rubs, or gallops.   Thorax & Lungs: Lungs clear to auscultation bilaterally, No respiratory distress, No wheezes, rhales or rhonchi, No chest wall tenderness. No stridor or tripoding. Patient is speaking full sentences.   Abdomen: Bowel sounds normal, Soft, non tender, non distended,  No pulsatile masses., no rebound guarding or peritoneal signs.   Skin: Warm, Dry, diffuse hives.   Back:  No CVA tenderness,  No spinal tenderness, bony crepitance, step offs, or " instability.   Neurologic: Alert & oriented x 3, Normal motor function, Normal sensory function, No focal deficits noted. Normal reflexes. Normal Cranial Nerves.  Extremities: Equal, intact distal pulses, No cyanosis, clubbing or edema,  No tenderness.   Musculoskeletal: Good range of motion in all major joints. No tenderness to palpation or major deformities noted.     DIAGNOSTIC STUDIES / PROCEDURES  None    COURSE & MEDICAL DECISION MAKING  Nursing notes, VS, PMSFHx reviewed in chart.    8:49 AM - Patient seen and examined at bedside. Patient will be treated with Solumedrol, Benadryl , Pepcid, Epi, and Zofran. The differential diagnoses include but are not limited to: allergic reaction, anaphylaxis. Patient treated with 1L of IV fluids for anaphylactic symptoms.     9:00 AM - Patient became nauseous. He was treated with Zofran.     9:21 AM - Recheck with the patient. His hives are gone and his lip swelling is improving. I discussed with him the treatment plan which includes being discharged on Solumedrol, Benadryl, and with Epi pins. He understands the treatment plan and verbalizes agreement. He will return to the ED with any new or worsening symptoms.    10:01 AM - I reassessed the patient. His rash is still gone at this time and his swelling has almost disappeared. Patient understands the treatment plan and verbalizes agreement.      The patient will return for new or worsening symptoms and is stable at the time of discharge.    CRITICAL CARE  The very real possibility of a deterioration of this patient's condition required the highest level of my preparedness for sudden, emergent intervention.  I provided critical care services, which included medication orders, frequent reevaluations of the patient's condition and response to treatment, ordering and reviewing test results, and discussing the case with various consultants.  The critical care time associated with the care of the patient was 35 minutes. Review  chart for interventions. This time is exclusive of any other billable procedures.     DISPOSITION:  Patient will be discharged home in stable condition.    FOLLOW UP:  TONIA Woods  [address] [address] [phone]    Call in 1 day  for recheck, As needed, If symptoms worsen    [facility], Emergency Dept  [address]  [place] [address] [phone]    As needed, If symptoms worsen    OUTPATIENT MEDICATIONS:  Discharge Medication List as of 9/15/2017 10:05 AM      START taking these medications    Details   predniSONE (DELTASONE) 10 MG Tab Take 4 tabs (40 mg) po daily on days 1-3, then take 3 tabs (30 mg) po daily on days 4-6, then take 2 tabs (20 mg) po daily on days 7-9, then take 1 tab (10 mg) po daily on days 10-12, then take 1/2 tab (5 mg) po daily on days 13-15.Disp-32 Tab, R-0      EPINEPHrine (EPIPEN) 0.3 MG/0.3ML Solution Auto-injector solution for injection 0.3 mL by Intramuscular route Once for 1 dose., Disp-0.3 mL, R-3, Print Rx Paper      famotidine (PEPCID) 20 MG Tab Take 1 Tab by mouth 2 times a day for 10 days., Disp-20 Tab, R-0, Normal      diphenhydrAMINE (BENADRYL) 25 MG capsule Take 1 Cap by mouth every 6 hours as needed., Disp-30 Cap, R-0, Normal           FINAL IMPRESSION  1. Allergic reaction, initial encounter       Tam DOS SANTOS (Scribe), am scribing for, and in the presence of, Maribell Huizar M.D..    Electronically signed by: Tam Valle ([place]), 9/15/2017    Maribell DOS SANTOS M.D. personally performed the services described in this documentation, as scribed by Tam Valle in my presence, and it is both accurate and complete.    The note accurately reflects work and decisions made by me.  Maribell Huizar  9/15/2017  3:36 PM

## 2017-09-15 NOTE — ED NOTES
Patient got stung once on the lip by a wasp.  Noted swelling to the face, came to the ER.  Severe swelling noted to the bottom lip and oral area.  Able to speak full sentences.  Vitals stable.  ERP notified.  Patient medicated as ordered.  IV placed.  Will monitor.

## 2017-09-15 NOTE — DISCHARGE INSTRUCTIONS
Allergies  An allergy is when your body reacts to a substance in a way that is not normal. An allergic reaction can happen after you:  · Eat something.  · Breathe in something.  · Touch something.  WHAT KINDS OF ALLERGIES ARE THERE?  You can be allergic to:  · Things that are only around during certain seasons, like molds and pollens.  · Foods.  · Drugs.  · Insects.  · Animal dander.  WHAT ARE SYMPTOMS OF ALLERGIES?  · Puffiness (swelling). This may happen on the lips, face, tongue, mouth, or throat.  · Sneezing.  · Coughing.  · Breathing loudly (wheezing).  · Stuffy nose.  · Tingling in the mouth.  · A rash.  · Itching.  · Itchy, red, puffy areas of skin (hives).  · Watery eyes.  · Throwing up (vomiting).  · Watery poop (diarrhea).  · Dizziness.  · Feeling faint or fainting.  · Trouble breathing or swallowing.  · A tight feeling in the chest.  · A fast heartbeat.  HOW ARE ALLERGIES DIAGNOSED?  Allergies can be diagnosed with:  · A medical and family history.  · Skin tests.  · Blood tests.  · A food diary. A food diary is a record of all the foods, drinks, and symptoms you have each day.  · The results of an elimination diet. This diet involves making sure not to eat certain foods and then seeing what happens when you start eating them again.  HOW ARE ALLERGIES TREATED?  There is no cure for allergies, but allergic reactions can be treated with medicine. Severe reactions usually need to be treated at a hospital.   HOW CAN REACTIONS BE PREVENTED?  The best way to prevent an allergic reaction is to avoid the thing you are allergic to. Allergy shots and medicines can also help prevent reactions in some cases.     This information is not intended to replace advice given to you by your health care provider. Make sure you discuss any questions you have with your health care provider.     Document Released: 04/14/2014 Document Revised: 01/08/2016 Document Reviewed: 09/29/2015  [employer] Interactive Patient Education ©2016  [email or web address]    Insect Sting Allergy  An insect sting can cause pain, redness, and itching at the sting site. Symptoms of an allergic reaction are usually contained in the area of the sting site (localized). An allergic reaction usually occurs within minutes of an insect sting. Redness and swelling of the sting site may last as long as 1 week.  SYMPTOMS   · A local reaction at the sting site can cause:   · Pain.   · Redness.   · Itching.   · Swelling.   · A systemic reaction can cause a reaction anywhere on your body. For example, you may develop the following:   · Hives.   · Generalized swelling.   · Body aches.   · Itching.   · Dizziness.   · Nausea or vomiting.   · A more serious (anaphylactic) reaction can involve:   · Difficulty breathing or wheezing.   · Tongue or throat swelling.   · Fainting.   HOME CARE INSTRUCTIONS   · If you are stung, look to see if the stinger is still in the skin. This can appear as a small, black dot at the sting site. The stinger can be removed by scraping it with a dull object such as a credit card or your fingernail. Do not use tweezers. Tweezers can squeeze the stinger and release more insect venom into the skin.   · After the stinger has been removed, wash the sting site with soap and water or rubbing alcohol.   · Put ice on the sting area.   · Put ice in a plastic bag.   · Place a towel between your skin and the bag.   · Leave the ice on for 15-20 minutes, 3-4 times a day.   · You can use a topical anti-itch cream, such as hydrocortisone cream, to help reduce itching.   · You can take an oral antihistamine medicine to help decrease swelling and other symptoms.   · Only take over-the-counter or prescription medicines for pain, discomfort, or fever as directed by your caregiver.   · If prescribed, keep an epinephrine injection to temporarily treat emergency allergic reactions with you at all times. It is important to know how and when to give an epinephrine injection.   · Avoid  contact with stinging insects or the insect thought to have caused your reaction.   · Wear long pants when mowing grass or hiking. Wear gloves when gardening.   · Use unscented deodorant and avoid strong perfumes when outdoors.   · Wear a medical alert bracelet or necklace that describes your allergies.   · Make sure your primary caregiver has a record of your insect sting reaction.   · It may be helpful to consult with an allergy specialist. You may have other sensitivities that you are not aware of.   SEEK IMMEDIATE MEDICAL CARE IF:  · You experience wheezing or difficulty breathing.   · You have difficulty swallowing, or you develop throat tightness.   · You have mouth, tongue, or throat swelling.   · You feel weak, or you faint.   · You have coughing or a change in your voice.   · You experience vomiting, diarrhea, or stomach cramps.   · You have chest pain or lightheadedness.   · You notice raised, red patches on the skin that itch.   These may be early warning signs of a serious generalized or anaphylactic reaction. Call your local emergency services (911 in [place] immediately.  MAKE SURE YOU:   · Understand these instructions.   · Will watch your condition.   · Will get help right away if you are not doing well or get worse.   FOR MORE INFORMATION  American Academy of Allergy Asthma and Immunology: www.aaaai.org  American College of Allergy, Asthma and Immunology: www.acaai.org  Document Released: 11/16/2007 Document Revised: 03/11/2013 Document Reviewed: 12/28/2010  [employer]® Patient Information ©2013 [employer].

## 2017-09-27 RX ORDER — PREDNISONE 10 MG/1
TABLET ORAL
Qty: 32 TAB | Refills: 0 | OUTPATIENT
Start: 2017-09-27

## 2017-09-30 DIAGNOSIS — E78.5 HYPERLIPIDEMIA: ICD-10-CM

## 2017-10-03 RX ORDER — SIMVASTATIN 20 MG
20 TABLET ORAL EVERY EVENING
Qty: 90 TAB | Refills: 3 | Status: SHIPPED | OUTPATIENT
Start: 2017-10-03 | End: 2018-11-08 | Stop reason: SDUPTHER

## 2017-10-03 RX ORDER — SIMVASTATIN 40 MG
TABLET ORAL
Qty: 90 TAB | Refills: 3 | OUTPATIENT
Start: 2017-10-03

## 2017-11-06 RX ORDER — IBUPROFEN 600 MG/1
TABLET ORAL
Qty: 90 TAB | Refills: 2 | Status: SHIPPED | OUTPATIENT
Start: 2017-11-06 | End: 2018-04-30 | Stop reason: SDUPTHER

## 2017-11-15 ENCOUNTER — TELEPHONE (OUTPATIENT)
Dept: MEDICAL GROUP | Facility: PHYSICIAN GROUP | Age: 57
End: 2017-11-15

## 2017-11-15 DIAGNOSIS — Z00.00 ROUTINE HEALTH MAINTENANCE: ICD-10-CM

## 2017-11-15 DIAGNOSIS — I10 ESSENTIAL HYPERTENSION: ICD-10-CM

## 2017-11-15 DIAGNOSIS — Z12.5 SCREENING FOR PROSTATE CANCER: ICD-10-CM

## 2017-11-15 DIAGNOSIS — E78.2 MIXED HYPERLIPIDEMIA: ICD-10-CM

## 2017-11-15 DIAGNOSIS — E55.9 VITAMIN D INSUFFICIENCY: ICD-10-CM

## 2018-01-11 ENCOUNTER — OFFICE VISIT (OUTPATIENT)
Dept: MEDICAL GROUP | Facility: PHYSICIAN GROUP | Age: 58
End: 2018-01-11
Payer: MEDICARE

## 2018-01-11 VITALS
BODY MASS INDEX: 31.5 KG/M2 | OXYGEN SATURATION: 96 % | RESPIRATION RATE: 16 BRPM | WEIGHT: 220 LBS | SYSTOLIC BLOOD PRESSURE: 122 MMHG | HEART RATE: 92 BPM | TEMPERATURE: 95.7 F | HEIGHT: 70 IN | DIASTOLIC BLOOD PRESSURE: 94 MMHG

## 2018-01-11 DIAGNOSIS — M54.50 CHRONIC MIDLINE LOW BACK PAIN WITHOUT SCIATICA: ICD-10-CM

## 2018-01-11 DIAGNOSIS — G89.29 CHRONIC MIDLINE LOW BACK PAIN WITHOUT SCIATICA: ICD-10-CM

## 2018-01-11 DIAGNOSIS — I10 ESSENTIAL HYPERTENSION: ICD-10-CM

## 2018-01-11 DIAGNOSIS — R82.5 POSITIVE URINE DRUG SCREEN: ICD-10-CM

## 2018-01-11 PROCEDURE — 99213 OFFICE O/P EST LOW 20 MIN: CPT | Performed by: NURSE PRACTITIONER

## 2018-01-11 RX ORDER — NAPROXEN SODIUM 220 MG
220 TABLET ORAL 2 TIMES DAILY WITH MEALS
COMMUNITY
End: 2018-04-30

## 2018-01-11 NOTE — PROGRESS NOTES
Subjective:     Chief Complaint   Patient presents with   • Chronic Opiate Therapy   • Back Pain   • Hypertension       HPI  Andrews Sanchez is a 57 y.o. male here today for the following:    Positive urine drug screen  Patient has had May and July 2017 + UDS with methamphetamine.   Reports hx of meth use, but promises he has not used it for >1 year. He believes he smokes cigarettes from a friend who uses meth and believes it transfers this way to him.     Essential hypertension  Chronic problem well controlled on current medications. Does not monitor blood pressure at home, but states he gives plasma and his bp elevated after decreasing cholesterol medication from 20 mg to 10 mg. Denies any severe headache, dizziness, vision changes, chest pain, [place], palpitations, or lower extremity edema      Chronic low back pain  Long-standing problem with history of back surgery and patient reporting the screw has since then caused pain. Previously managed with Tylenol #4 the patient has had positive drug screens for methamphetamine, therefore had not been prescribing pain medication. He reports that he has been buying this on his own because the pain is not manageable without medication       Diagnoses of Essential hypertension, Positive urine drug screen, and Chronic midline low back pain without sciatica were pertinent to this visit.    Allergies: Patient has no known allergies.  Current medicines (including changes today)  Current Outpatient Prescriptions   Medication Sig Dispense Refill   • naproxen (ALEVE) 220 MG tablet Take 220 mg by mouth 2 times a day, with meals.     • ibuprofen (MOTRIN) 600 MG Tab TAKE 1 TABLET BY MOUTH EVERY 8 HOURS AS NEEDED FOR INFLAMMATION. 90 Tab 2   • simvastatin (ZOCOR) 20 MG Tab Take 1 Tab by mouth every evening. 90 Tab 3   • amlodipine (NORVASC) 5 MG Tab Take 1 Tab by mouth every day. 90 Tab 3   • acetaminophen (TYLENOL) 500 MG Tab Take 500-1,000 mg by mouth every 6 hours as needed.    "    No current facility-administered medications for this visit.        He  has a past medical history of Hypercholesteremia; Hypertension; and Muscle disorder.      ROS  As stated in HPI      Objective:     Blood pressure 122/94, pulse 92, temperature (!) 35.4 °C (95.7 °F), resp. rate 16, height 1.778 m (5' 10\"), weight 99.8 kg (220 lb), SpO2 96 %. Body mass index is 31.57 kg/m².  Physical Exam:  General: Alert, oriented, in no acute distress.  Eye contact is good, speech goal directed, affect slightly hyperactive, but improved from his baseline   CNs grossly intact.  Gross hearing intact.  Gait steady.     Assessment and Plan:   Assessment/Plan:  1. Essential hypertension  Slightly increased today. Patient refuses change in medication at this time. Continue to monitor. Goal bp <140/90    2. Positive urine drug screen  [facility]; Future    3. Chronic midline low back pain without sciatica  Not controlled but unable to prescribe any medication until UDS results back        Follow up:  Return pending UDS results.    Educated in proper administration of medication(s) ordered today including safety, possible SE, risks, benefits, rationale and alternatives to therapy.   Supportive care, differential diagnoses, and indications for immediate follow-up discussed with patient.    Pathogenesis of diagnosis discussed including typical length and natural progression.    Instructed to return to clinic or nearest emergency department for any change in condition, further concerns, or worsening of symptoms.  Patient states understanding of the plan of care and discharge instructions.      Please note that this dictation was created using voice recognition software. I have made every reasonable attempt to correct obvious errors, but I expect that there are errors of grammar and possibly content that I did not discover before finalizing the note.    Followup: Return pending UDS results. sooner should new " symptoms or problems arise.

## 2018-01-11 NOTE — ASSESSMENT & PLAN NOTE
Long-standing problem with history of back surgery and patient reporting the screw has since then caused pain. Previously managed with Tylenol #4 the patient has had positive drug screens for methamphetamine, therefore had not been prescribing pain medication. He reports that he has been buying this on his own because the pain is not manageable without medication

## 2018-01-11 NOTE — ASSESSMENT & PLAN NOTE
Patient has had May and July 2017 + UDS with methamphetamine.   Reports hx of meth use, but promises he has not used it for >1 year. He believes he smokes cigarettes from a friend who uses meth and believes it transfers this way to him.

## 2018-01-16 RX ORDER — TEMAZEPAM 30 MG/1
CAPSULE ORAL
Qty: 30 CAP | Refills: 0
Start: 2018-01-16

## 2018-01-19 ENCOUNTER — TELEPHONE (OUTPATIENT)
Dept: MEDICAL GROUP | Facility: PHYSICIAN GROUP | Age: 58
End: 2018-01-19

## 2018-01-19 NOTE — TELEPHONE ENCOUNTER
We will call him for a random drug screen. When we call him, he will need to show up within 24 hours of the call.     GHADA Mcdermott.

## 2018-01-19 NOTE — TELEPHONE ENCOUNTER
----- Message from TONIA Mcdermott sent at 1/17/2018  1:28 PM PST -----  Please inform patient his urine drug screen is still showing positive for methamphetamine. I can treat his chronic medical conditions, but I cannot give pain medication or sleep aides any longer.     TONIA Mcdermott

## 2018-01-31 ENCOUNTER — TELEPHONE (OUTPATIENT)
Dept: MEDICAL GROUP | Facility: PHYSICIAN GROUP | Age: 58
End: 2018-01-31

## 2018-01-31 NOTE — TELEPHONE ENCOUNTER
Please inform patient that he has to come in tomorrow (Thursday) for urine drug screen.     GHAAD Mcdermott.

## 2018-01-31 NOTE — TELEPHONE ENCOUNTER
----- Message from TONIA Mcdermott sent at 1/19/2018  1:25 PM PST -----  Call in for RDS within 24 hours of telephone call

## 2018-02-02 ENCOUNTER — OFFICE VISIT (OUTPATIENT)
Dept: MEDICAL GROUP | Facility: PHYSICIAN GROUP | Age: 58
End: 2018-02-02
Payer: MEDICARE

## 2018-02-02 VITALS
HEIGHT: 70 IN | BODY MASS INDEX: 32.21 KG/M2 | HEART RATE: 99 BPM | TEMPERATURE: 98.1 F | SYSTOLIC BLOOD PRESSURE: 150 MMHG | WEIGHT: 225 LBS | DIASTOLIC BLOOD PRESSURE: 102 MMHG | OXYGEN SATURATION: 96 %

## 2018-02-02 DIAGNOSIS — Z79.891 CHRONIC USE OF OPIATE DRUGS THERAPEUTIC PURPOSES: ICD-10-CM

## 2018-02-02 DIAGNOSIS — Z23 NEED FOR VACCINATION: ICD-10-CM

## 2018-02-02 DIAGNOSIS — R82.5 POSITIVE URINE DRUG SCREEN: ICD-10-CM

## 2018-02-02 ASSESSMENT — PAIN SCALES - GENERAL: PAINLEVEL: 10=SEVERE PAIN

## 2018-02-03 DIAGNOSIS — Z23 NEED FOR VACCINATION: ICD-10-CM

## 2018-02-04 NOTE — PROGRESS NOTES
Patient came in to clinic today for a random drug screen. I did not personally evaluate the patient. This visit should not be charged for a provider visit    Assessment and Plan:   Assessment/Plan:  1. Chronic use of opiate drugs therapeutic purposes  - [facility]; Future    2. Positive urine [facility] screen  [facility]; [facility]    3. Need for vaccination  I have placed the below orders and discussed them with an approved delegating provider. The MA is performing the below orders under the direction of Dr. Fisher  - INFLUENZA VACCINE QUAD INJ >3Y(PF)

## 2018-02-05 ENCOUNTER — TELEPHONE (OUTPATIENT)
Dept: MEDICAL GROUP | Facility: PHYSICIAN GROUP | Age: 58
End: 2018-02-05

## 2018-02-07 RX ORDER — TEMAZEPAM 30 MG/1
CAPSULE ORAL
Qty: 30 CAP | Refills: 0
Start: 2018-02-07

## 2018-02-08 ENCOUNTER — TELEPHONE (OUTPATIENT)
Dept: MEDICAL GROUP | Facility: PHYSICIAN GROUP | Age: 58
End: 2018-02-08

## 2018-02-08 DIAGNOSIS — M54.50 CHRONIC MIDLINE LOW BACK PAIN WITHOUT SCIATICA: ICD-10-CM

## 2018-02-08 DIAGNOSIS — G47.01 INSOMNIA DUE TO MEDICAL CONDITION: ICD-10-CM

## 2018-02-08 DIAGNOSIS — G89.29 CHRONIC MIDLINE LOW BACK PAIN WITHOUT SCIATICA: ICD-10-CM

## 2018-02-08 RX ORDER — TEMAZEPAM 30 MG/1
30 CAPSULE ORAL NIGHTLY PRN
Qty: 30 CAP | Refills: 0 | Status: SHIPPED
Start: 2018-02-08 | End: 2018-03-10

## 2018-02-08 NOTE — TELEPHONE ENCOUNTER
Please inform patient his urine drug screen did come back negative for methamphetamine. Please tell him that I will send him a one-month supply of pain medication. Per contract, he may not use any illegal substances, take any narcotic or benzodiazepine medication from anyone else. He is to use his Tylenol #4 that is prescribed to him for his pain, his temazepam at bedtime, and that is it. I may call [facility] for random drug screen any point in time and he will need to be here within 24 hours of the telephone call.    GHADA Mcdermott.

## 2018-03-04 ENCOUNTER — TELEPHONE (OUTPATIENT)
Dept: MEDICAL GROUP | Facility: PHYSICIAN GROUP | Age: 58
End: 2018-03-04

## 2018-03-04 DIAGNOSIS — G47.01 INSOMNIA DUE TO MEDICAL CONDITION: ICD-10-CM

## 2018-03-04 DIAGNOSIS — Z79.891 CHRONIC USE OF OPIATE DRUGS THERAPEUTIC PURPOSES: ICD-10-CM

## 2018-03-04 DIAGNOSIS — M54.50 CHRONIC MIDLINE LOW BACK PAIN WITHOUT SCIATICA: ICD-10-CM

## 2018-03-04 DIAGNOSIS — G89.29 CHRONIC MIDLINE LOW BACK PAIN WITHOUT SCIATICA: ICD-10-CM

## 2018-03-05 RX ORDER — TEMAZEPAM 30 MG/1
CAPSULE ORAL
Qty: 30 CAP | Refills: 0 | Status: CANCELLED
Start: 2018-03-05 | End: 2018-04-04

## 2018-03-05 NOTE — TELEPHONE ENCOUNTER
Please call patient to inform him that he needs to come to the clinic today or tomorrow 3/6 for random drug screen.    GHADA Mcdermott.

## 2018-03-06 ENCOUNTER — NON-PROVIDER VISIT (OUTPATIENT)
Dept: MEDICAL GROUP | Facility: PHYSICIAN GROUP | Age: 58
End: 2018-03-06
Payer: MEDICARE

## 2018-03-06 DIAGNOSIS — Z79.891 CHRONIC USE OF OPIATE DRUGS THERAPEUTIC PURPOSES: ICD-10-CM

## 2018-03-06 NOTE — PROGRESS NOTES
Andrews Sanchez is a 57 y.o. male here for a non-provider visit for [facility] Screen     If abnormal was an in office provider notified today (if so, indicate provider)? N/A  Routed to PCP? No

## 2018-03-09 DIAGNOSIS — G89.29 CHRONIC MIDLINE LOW BACK PAIN WITHOUT SCIATICA: ICD-10-CM

## 2018-03-09 DIAGNOSIS — G47.01 INSOMNIA DUE TO MEDICAL CONDITION: ICD-10-CM

## 2018-03-09 DIAGNOSIS — M54.50 CHRONIC MIDLINE LOW BACK PAIN WITHOUT SCIATICA: ICD-10-CM

## 2018-03-09 RX ORDER — TEMAZEPAM 30 MG/1
CAPSULE ORAL
Qty: 30 CAP | Refills: 0
Start: 2018-03-09 | End: 2018-04-08

## 2018-03-13 ENCOUNTER — TELEPHONE (OUTPATIENT)
Dept: MEDICAL GROUP | Facility: PHYSICIAN GROUP | Age: 58
End: 2018-03-13

## 2018-03-13 DIAGNOSIS — G89.29 CHRONIC MIDLINE LOW BACK PAIN WITHOUT SCIATICA: ICD-10-CM

## 2018-03-13 DIAGNOSIS — G47.01 INSOMNIA DUE TO MEDICAL CONDITION: ICD-10-CM

## 2018-03-13 DIAGNOSIS — M54.50 CHRONIC MIDLINE LOW BACK PAIN WITHOUT SCIATICA: ICD-10-CM

## 2018-03-13 NOTE — TELEPHONE ENCOUNTER
----- Message from TONIA Mcdermott sent at 3/12/2018  9:05 PM PDT -----  Please inform patient his urine drug screen is good. I will be back in office Wednesday and will send his Rxs over.    TONIA Mcdermott

## 2018-03-14 RX ORDER — TEMAZEPAM 30 MG/1
CAPSULE ORAL
Qty: 30 CAP | Refills: 0 | Status: SHIPPED
Start: 2018-03-14 | End: 2018-04-05

## 2018-03-27 ENCOUNTER — TELEPHONE (OUTPATIENT)
Dept: MEDICAL GROUP | Facility: PHYSICIAN GROUP | Age: 58
End: 2018-03-27

## 2018-03-27 DIAGNOSIS — Z79.891 CHRONIC USE OF OPIATE DRUGS THERAPEUTIC PURPOSES: ICD-10-CM

## 2018-03-27 NOTE — TELEPHONE ENCOUNTER
Please call patient to inform him it is time for his monthly random drug screen. He must come into clinic by Thursday 6:30 PM for urine drug test if he wishes to continue to get narcotic pain medication.    GHADA Mcdermott.

## 2018-03-30 RX ORDER — IBUPROFEN 600 MG/1
TABLET ORAL
Qty: 90 TAB | Refills: 0 | OUTPATIENT
Start: 2018-03-30

## 2018-04-30 RX ORDER — IBUPROFEN 600 MG/1
TABLET ORAL
Qty: 270 TAB | Refills: 1 | Status: SHIPPED | OUTPATIENT
Start: 2018-04-30 | End: 2018-11-05 | Stop reason: SDUPTHER

## 2018-07-05 ENCOUNTER — HOSPITAL ENCOUNTER (EMERGENCY)
Facility: MEDICAL CENTER | Age: 58
End: 2018-07-05
Attending: EMERGENCY MEDICINE
Payer: MEDICARE

## 2018-07-05 ENCOUNTER — APPOINTMENT (OUTPATIENT)
Dept: RADIOLOGY | Facility: MEDICAL CENTER | Age: 58
End: 2018-07-05
Attending: EMERGENCY MEDICINE
Payer: MEDICARE

## 2018-07-05 VITALS
SYSTOLIC BLOOD PRESSURE: 165 MMHG | BODY MASS INDEX: 31.81 KG/M2 | RESPIRATION RATE: 18 BRPM | TEMPERATURE: 98.2 F | HEIGHT: 70 IN | OXYGEN SATURATION: 97 % | WEIGHT: 222.22 LBS | HEART RATE: 88 BPM | DIASTOLIC BLOOD PRESSURE: 78 MMHG

## 2018-07-05 DIAGNOSIS — S22.32XA CLOSED FRACTURE OF ONE RIB OF LEFT SIDE, INITIAL ENCOUNTER: ICD-10-CM

## 2018-07-05 PROCEDURE — 99284 EMERGENCY DEPT VISIT MOD MDM: CPT

## 2018-07-05 PROCEDURE — A9270 NON-COVERED ITEM OR SERVICE: HCPCS | Performed by: EMERGENCY MEDICINE

## 2018-07-05 PROCEDURE — 71101 X-RAY EXAM UNILAT RIBS/CHEST: CPT | Mod: LT

## 2018-07-05 PROCEDURE — 700102 HCHG RX REV CODE 250 W/ 637 OVERRIDE(OP): Performed by: EMERGENCY MEDICINE

## 2018-07-05 RX ORDER — OXYCODONE HYDROCHLORIDE AND ACETAMINOPHEN 5; 325 MG/1; MG/1
1 TABLET ORAL ONCE
Status: COMPLETED | OUTPATIENT
Start: 2018-07-05 | End: 2018-07-05

## 2018-07-05 RX ORDER — OXYCODONE HYDROCHLORIDE AND ACETAMINOPHEN 5; 325 MG/1; MG/1
1-2 TABLET ORAL EVERY 4 HOURS PRN
Qty: 40 TAB | Refills: 0 | Status: SHIPPED | OUTPATIENT
Start: 2018-07-05 | End: 2018-07-11

## 2018-07-05 RX ORDER — HYDROCODONE BITARTRATE AND ACETAMINOPHEN 5; 325 MG/1; MG/1
1-2 TABLET ORAL EVERY 4 HOURS PRN
Qty: 40 TAB | Refills: 0 | Status: SHIPPED | OUTPATIENT
Start: 2018-07-05 | End: 2018-07-11

## 2018-07-05 RX ADMIN — OXYCODONE AND ACETAMINOPHEN 1 TABLET: 5; 325 TABLET ORAL at 11:45
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
## 2018-07-05 NOTE — ED NOTES
"Patient states \"vicodin aren't strong enough, can you ask the doctor to give me percocets?\". Provider notified.   "
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
## 2018-07-05 NOTE — DISCHARGE INSTRUCTIONS
Rib Fracture  A rib fracture is a break or crack in one of the bones of the ribs. The ribs are a group of long, curved bones that wrap around your chest and attach to your spine. They protect your lungs and other organs in the chest cavity. A broken or cracked rib is often painful, but most do not cause other problems. Most rib fractures heal on their own over time. However, rib fractures can be more serious if multiple ribs are broken or if broken ribs move out of place and push against other structures.  What are the causes?  · A direct blow to the chest. For example, this could happen during contact sports, a car accident, or a fall against a hard object.  · Repetitive movements with high force, such as pitching a baseball or having severe coughing spells.  What are the signs or symptoms?  · Pain when you breathe in or cough.  · Pain when someone presses on the injured area.  How is this diagnosed?  Your caregiver will perform a physical exam. Various imaging tests may be ordered to confirm the diagnosis and to look for related injuries. These tests may include a chest X-ray, computed tomography (CT), magnetic resonance imaging (MRI), or a bone scan.  How is this treated?  Rib fractures usually heal on their own in 1-3 months. The longer healing period is often associated with a continued cough or other aggravating activities. During the healing period, pain control is very important. Medication is usually given to control pain. Hospitalization or surgery may be needed for more severe injuries, such as those in which multiple ribs are broken or the ribs have moved out of place.  Follow these instructions at home:  · Avoid strenuous activity and any activities or movements that cause pain. Be careful during activities and avoid bumping the injured rib.  · Gradually increase activity as directed by your caregiver.  · Only take over-the-counter or prescription medications as directed by your caregiver. Do not take  other medications without asking your caregiver first.  · Apply ice to the injured area for the first 1-2 days after you have been treated or as directed by your caregiver. Applying ice helps to reduce inflammation and pain.  ¨ Put ice in a plastic bag.  ¨ Place a towel between your skin and the bag.  ¨ Leave the ice on for 15-20 minutes at a time, every 2 hours while you are awake.  · Perform deep breathing as directed by your caregiver. This will help prevent pneumonia, which is a common complication of a broken rib. Your caregiver may instruct you to:  ¨ Take deep breaths several times a day.  ¨ Try to cough several times a day, holding a pillow against the injured area.  ¨ Use a device called an incentive spirometer to practice deep breathing several times a day.  · Drink enough fluids to keep your urine clear or pale yellow. This will help you avoid constipation.  · Do not wear a rib belt or binder. These restrict breathing, which can lead to pneumonia.  Get help right away if:  · You have a fever.  · You have difficulty breathing or shortness of breath.  · You develop a continual cough, or you cough up thick or bloody sputum.  · You feel sick to your stomach (nausea), throw up (vomit), or have abdominal pain.  · You have worsening pain not controlled with medications.  This information is not intended to replace advice given to you by your health care provider. Make sure you discuss any questions you have with your health care provider.  Document Released: 12/18/2006 Document Revised: 05/25/2017 Document Reviewed: 02/19/2014  [employer] Interactive Patient Education © 2017 [employer]

## 2018-07-05 NOTE — ED NOTES
.Patient states understanding of discharge instructions. Ambulated with steady gait out of ED. Personal belongings with patient. D/C wit prescriptions x 1

## 2018-11-06 RX ORDER — IBUPROFEN 600 MG/1
TABLET ORAL
Qty: 180 TAB | Refills: 0 | Status: SHIPPED | OUTPATIENT
Start: 2018-11-06 | End: 2019-01-02 | Stop reason: SDUPTHER

## 2018-11-08 RX ORDER — SIMVASTATIN 20 MG
20 TABLET ORAL EVERY EVENING
Qty: 90 TAB | Refills: 3 | Status: SHIPPED | OUTPATIENT
Start: 2018-11-08

## 2019-01-03 RX ORDER — IBUPROFEN 600 MG/1
TABLET ORAL
Qty: 180 TAB | Refills: 0 | Status: SHIPPED | OUTPATIENT
Start: 2019-01-03 | End: 2019-02-28 | Stop reason: SDUPTHER

## 2019-02-28 RX ORDER — IBUPROFEN 600 MG/1
TABLET ORAL
Qty: 180 TAB | Refills: 1 | Status: SHIPPED | OUTPATIENT
Start: 2019-02-28

## 2020-01-01 NOTE — ASSESSMENT & PLAN NOTE
Chronic problem well controlled on current medications. Does not monitor blood pressure at home, but states he gives plasma and his bp elevated after decreasing cholesterol medication from 20 mg to 10 mg. Denies any severe headache, dizziness, vision changes, chest pain, RAGSDALE, palpitations, or lower extremity edema     SBAR OUT Report: BABY    Verbal report given to Tania Teixeira RN (full name and credentials) on this patient, being transferred to MIU (unit) for routine progression of care. Report consisted of Situation, Background, Assessment, and Recommendations (SBAR). [place] ID bands were compared with the identification form, and verified with the patient's mother and receiving nurse. Information from the SBAR, Kardex, Intake/Output, MAR and Accordion and the [place] Report was reviewed with the receiving nurse. According to the estimated gestational age scale, this infant is 40.0. BETA STREP:   The mother's Group Beta Strep (GBS) result was negative. Prenatal care was received by this patients mother. Opportunity for questions and clarification provided.

## 2020-09-19 NOTE — TELEPHONE ENCOUNTER
Requested Prescriptions     Signed Prescriptions Disp Refills   • temazepam (RESTORIL) 30 MG capsule 30 Cap 0     Sig: TAKE 1 CAPSULE BY MOUTH ONCE DAILY AT BEDTIME AS NEEDED FOR SLEEP     Authorizing Provider: KRISHAN FERNANDEZ     Ordering User: KURTIS HIDALGO   • acetaminophen-codeine #4 (TYLENOL #4) 300-60 MG Tab 120 Tab 0     Sig: TAKE 1 TABLET BY MOUTH EVERY 6 HOURS AS NEEDED     Authorizing Provider: KRISHAN FERNANDEZ     Ordering User: KURTIS HIDALGO     Notes from Krishan Fernandez noted along with TONIA Rouse    
[facility]  
Was the patient seen in the last year in this department? Yes     Does patient have an active prescription for medications requested? No     Received Request Via: Patient     Pt came today for UDS  
2 seconds or less

## 2021-01-27 NOTE — PROGRESS NOTES
Subjective:     Chief Complaint   Patient presents with   • Follow-Up     4 months        HPI  Andrews Sanchez is a 57 y.o. male here today for routine f/u     Chronic low back pain  Long-standing problem with history of back surgery that has progressively become worse. Patient states that he feels a screw is pressing on him in his low back, and this was previously managed with Tylenol No. 4, but unfortunately a urine drug screen came out positive for methamphetamine so I have not been providing patient with pain management. He states muscle relaxers do not work. Denies any bowel or bladder changes, fever, chills    Chronic use of opiate drugs therapeutic purposes  Last dose of narcotic medication: Tylenol #4 last 7 weeks ago, Percocet this morning, states Sudafed last dose Sunday, last dose of Restoril 7 weeks ago, last dose of Morphine 4 days ago. He also just saw the dentist 7/13/17 and was put to sleep with (Fentanyl, Midazolam, Ketamine) cocktail.     Aberrant behavior: Continues to tell me that he is taking Sudafed and not using illicit substance methamphetamine  Affect and mood: Talkative, slightly anxious, but pleasant    Nonnarcotic treatments that are being used: NSAIDs and exercise  Pain management agreement initiated and signed on: June 2016  Most recent urine drug screen done May 2017, and is not consistent with prescribed medications; inconsistent with oxycodone and positive for methamphetamine      Essential hypertension  Chronic problem well controlled on current medications. Does not monitor blood pressure at home. Denies any severe headache, dizziness, vision changes, chest pain, RAGSDALE, palpitations, or lower extremity edema      Hyperlipidemia  Established problem that has improved with initiation of simvastatin without myalgias side effect. He exercises regularly   Ref. Range 7/12/2016 07:20 3/15/2017 08:05   Cholesterol,Tot Latest Ref Range: 100-199 mg/dL 213 (H) 158   Triglycerides Latest Ref  "Range: 0-149 mg/dL 203 (H) 148   HDL Latest Ref Range: >39 mg/dL 39 (L) 47   LDL Latest Ref Range: 0-99 mg/dL 133 (H) 81       Occult blood positive stool  States he was eating peanuts and having constipation when this occurred. He is refusing referral for GI for colonoscopy    Ref. Range 3/15/2017 12:00   Occult Blood, IA Latest Ref Range: Negative  Positive (A)      Diagnoses of Chronic midline low back pain without sciatica, Chronic use of opiate drugs therapeutic purposes, Positive urine drug screen, Essential hypertension, Mixed hyperlipidemia, and Occult blood positive stool were pertinent to this visit.    Allergies: Review of patient's allergies indicates no known allergies.  Current medicines (including changes today)  Current Outpatient Prescriptions   Medication Sig Dispense Refill   • acetaminophen (TYLENOL) 500 MG Tab Take 500-1,000 mg by mouth every 6 hours as needed.     • oxycodone-acetaminophen (PERCOCET-10)  MG Tab Take 1-2 Tabs by mouth every four hours as needed for Severe Pain.     • pseudoephedrine (SUDAFED) 30 MG Tab Take 60 mg by mouth every four hours as needed for Congestion.     • ibuprofen (MOTRIN) 600 MG Tab TAKE 1 TABLET BY MOUTH EVERY 8 HOURS AS NEEDED FOR INFLAMMATION. 90 Tab 2   • amlodipine (NORVASC) 5 MG Tab Take 1 Tab by mouth every day. 90 Tab 3   • simvastatin (ZOCOR) 40 MG Tab Take 1 Tab by mouth every evening. 90 Tab 3     No current facility-administered medications for this visit.       He  has a past medical history of Hypercholesteremia; Hypertension; and Muscle disorder.    Health Maintenance: deferred    ROS  As stated in HPI      Objective:     Blood pressure 130/78, pulse 125, temperature 36.9 °C (98.4 °F), resp. rate 16, height 1.778 m (5' 10\"), weight 102.513 kg (226 lb), SpO2 94 %. Body mass index is 32.43 kg/(m^2).  Physical Exam:  General: Alert, oriented, in no acute distress.  Eye contact is good, speech goal directed, affect talkative, slightly anxious, " pleasant though  CNs grossly intact.  Gross hearing intact.  Gait steady.     Assessment and Plan:   Assessment/Plan:  1. Chronic midline low back pain without sciatica  Chronic problem not currently controlled, but I have discussed with patient that I cannot prescribe any pain medication until we repeat urine drug screens. I have offered the use of a muscle relaxer and he declines. X-ray has already been completed that shows nothing broken in regards to his previous surgery    2. Chronic use of opiate drugs therapeutic purposes  We will repeat urine drug screen now. Patient continues to tell me that he is taking Sudafed which would be the reason for his positive methamphetamine. I discussed with him that we can test in December study to determine if this is an illicit substance or over-the-counter substance. He seems somewhat argumentative about this in clinic today  [facility]; Future    3. Positive urine drug screen  We will repeat urine drug screen now. Patient continues to tell me that he is taking Sudafed which would be the reason for his positive methamphetamine. I discussed with him that we can test in December study to determine if this is an illicit substance or over-the-counter substance. He seems somewhat argumentative about this in clinic today  - [facility] PAIN MANAGEMENT SCREEN; Future    4. Essential hypertension  Stable on current medications    5. Mixed hyperlipidemia  Stable on statin therapy    6. Occult blood positive stool  Patient refuses colonoscopy, but I have convinced him into repeating the stool sample to check for blood.  - OCCULT BLOOD FECES IMMUNOASSAY; Future    The total time spent seeing the patient in consultation, and formulating an action plan for this visit was 15 minutes.  Greater than 50% of this time was spent face to face counseling, discussing problems documented above, coordinating care and answering questions by the provider.        Follow  up:  Return in about 4 weeks (around 8/15/2017).    Educated in proper administration of medication(s) ordered today including safety, possible SE, risks, benefits, rationale and alternatives to therapy.   Supportive care, differential diagnoses, and indications for immediate follow-up discussed with patient.    Pathogenesis of diagnosis discussed including typical length and natural progression.    Instructed to return to clinic or nearest emergency department for any change in condition, further concerns, or worsening of symptoms.  Patient states understanding of the plan of care and discharge instructions.      Please note that this dictation was created using voice recognition software. I have made every reasonable attempt to correct obvious errors, but I expect that there are errors of grammar and possibly content that I did not discover before finalizing the note.    Followup: Return in about 4 weeks (around 8/15/2017). sooner should new symptoms or problems arise.            Medical Necessity Information: It is in your best interest to select a reason for this procedure from the list below. All of these items fulfill various CMS LCD requirements except the new and changing color options. Medical Necessity Clause: This procedure was medically necessary because the lesion that was treated was: Lab: [id number] Lab Facility: [address] [place] [address] Body Location Override (Optional - Billing Will Still Be Based On Selected Body Map Location If Applicable): Right low periorbital Detail Level: Detailed Was A Bandage Applied: Yes Size Of Lesion In Cm (Required): 0.9 X Size Of Lesion In Cm (Optional): 0 Biopsy Method: double edge Personna blade Anesthesia Type: 1% lidocaine without epinephrine Anesthesia Volume In Cc: 1.5 Hemostasis: Aluminum Chloride Wound Care: Bacitracin Path Notes Override (Will Replace All Of The Above Text): R/O: AK VS SCC \\nSize: 0.9 Render Path Notes In Note?: No Consent was obtained from the patient. The risks and benefits to therapy were discussed in detail. Specifically, the risks of infection, scarring, bleeding, prolonged wound healing, incomplete removal, allergy to anesthesia, nerve injury and recurrence were addressed. Prior to the procedure, the treatment site was clearly identified and confirmed by the patient. All components of Universal Protocol/PAUSE Rule completed. Post-Care Instructions: I reviewed with the patient in detail post-care instructions. Patient is to keep the biopsy site dry overnight, and then apply bacitracin twice daily until healed. Patient may apply hydrogen peroxide soaks to remove any crusting. Notification Instructions: Patient will be notified of pathology results. However, patient instructed to call the office if not contacted within 2 weeks. Billing Type: [employer] Lab: [id number] Lab Facility: 12 Body Location Override (Optional - Billing Will Still Be Based On Selected Body Map Location If Applicable): Right mid forearm Size Of Lesion In Cm (Required): 1 Path Notes Override (Will Replace All Of The Above Text): R/O: DN vs NUB vs AK \\nSize: 1.0 Billing Type: Third-Party Bill Body Location Override (Optional - Billing Will Still Be Based On Selected Body Map Location If Applicable): Right mid forehead Body Location Override (Optional - Billing Will Still Be Based On Selected Body Map Location If Applicable): Right lateral forehead Size Of Lesion In Cm (Required): 1.1 Path Notes Override (Will Replace All Of The Above Text): R/O: AK vs SCC \\nSize: 1.1

## 2021-08-26 NOTE — TELEPHONE ENCOUNTER
PRIMARY DIAGNOSIS: GENERALIZED WEAKNESS    OUTPATIENT/OBSERVATION GOALS TO BE MET BEFORE DISCHARGE  1. Orthostatic performed: N/A    2. Tolerating PO medications: Yes    3. Return to near baseline physical activity: Yes    4. Cleared for discharge by consultants (if involved): Yes    Discharge Planner Nurse   Safe discharge environment identified: Yes  Barriers to discharge: Yes       Entered by: Jose G Delgado 08/26/2021 9:37 AM   Assist of 1 transfer to chair, c/o gas pain, relieved after scheduled Pepcid..  Please review provider order for any additional goals.   Nurse to notify provider when observation goals have been met and patient is ready for discharge.   Was the patient seen in the last year in this department? Yes     Does patient have an active prescription for medications requested? No     Received Request Via: Pharmacy

## 2022-09-06 PROBLEM — M79.605 ACUTE LEG PAIN, LEFT: Status: ACTIVE | Noted: 2022-09-06

## 2023-04-10 NOTE — TELEPHONE ENCOUNTER
Pt. Notified.  Orders mailed to him   The left coronary artery was selectively engaged and injected. Multiple views of the injected vessel were taken.

## 2024-01-01 ENCOUNTER — APPOINTMENT (OUTPATIENT)
Dept: RADIOLOGY | Facility: MEDICAL CENTER | Age: 64
DRG: 304 | End: 2024-01-01
Attending: EMERGENCY MEDICINE
Payer: COMMERCIAL

## 2024-01-01 ENCOUNTER — APPOINTMENT (OUTPATIENT)
Dept: RADIOLOGY | Facility: MEDICAL CENTER | Age: 64
DRG: 304 | End: 2024-01-01
Attending: INTERNAL MEDICINE
Payer: COMMERCIAL

## 2024-01-01 ENCOUNTER — APPOINTMENT (OUTPATIENT)
Dept: RADIOLOGY | Facility: MEDICAL CENTER | Age: 64
DRG: 304 | End: 2024-01-01
Attending: HOSPITALIST
Payer: COMMERCIAL

## 2024-01-01 PROCEDURE — 93975 VASCULAR STUDY: CPT | Performed by: INTERNAL MEDICINE

## 2024-01-01 PROCEDURE — 70551 MRI BRAIN STEM W/O DYE: CPT

## 2024-01-01 PROCEDURE — 71045 X-RAY EXAM CHEST 1 VIEW: CPT

## 2024-01-01 PROCEDURE — 70496 CT ANGIOGRAPHY HEAD: CPT

## 2024-01-01 PROCEDURE — 93975 VASCULAR STUDY: CPT

## 2024-01-01 PROCEDURE — 70450 CT HEAD/BRAIN W/O DYE: CPT

## 2024-01-01 PROCEDURE — 70498 CT ANGIOGRAPHY NECK: CPT

## 2024-01-01 PROCEDURE — 74018 RADEX ABDOMEN 1 VIEW: CPT

## 2024-03-24 ENCOUNTER — HOSPITAL ENCOUNTER (INPATIENT)
Facility: MEDICAL CENTER | Age: 64
DRG: 304 | End: 2024-03-24
Attending: EMERGENCY MEDICINE | Admitting: INTERNAL MEDICINE
Payer: COMMERCIAL

## 2024-03-24 ENCOUNTER — APPOINTMENT (OUTPATIENT)
Dept: CARDIOLOGY | Facility: MEDICAL CENTER | Age: 64
DRG: 304 | End: 2024-03-24
Attending: INTERNAL MEDICINE
Payer: COMMERCIAL

## 2024-03-24 DIAGNOSIS — J18.9 PNEUMONIA OF LEFT LOWER LOBE DUE TO INFECTIOUS ORGANISM: ICD-10-CM

## 2024-03-24 DIAGNOSIS — G93.40 ENCEPHALOPATHY: ICD-10-CM

## 2024-03-24 DIAGNOSIS — N17.9 AKI (ACUTE KIDNEY INJURY) (HCC): ICD-10-CM

## 2024-03-24 DIAGNOSIS — S01.311A EAR LOBE LACERATION, RIGHT, INITIAL ENCOUNTER: ICD-10-CM

## 2024-03-24 DIAGNOSIS — E87.6 HYPOKALEMIA: ICD-10-CM

## 2024-03-24 DIAGNOSIS — I61.2 NONTRAUMATIC HEMORRHAGE OF LEFT CEREBRAL HEMISPHERE (HCC): ICD-10-CM

## 2024-03-24 DIAGNOSIS — I63.81 MULTIPLE LACUNAR INFARCTS (HCC): ICD-10-CM

## 2024-03-24 DIAGNOSIS — I16.1 HYPERTENSIVE EMERGENCY: ICD-10-CM

## 2024-03-24 PROBLEM — I16.9 HYPERTENSIVE CRISIS: Status: ACTIVE | Noted: 2024-03-24

## 2024-03-24 LAB
ALBUMIN SERPL BCP-MCNC: 3.9 G/DL (ref 3.2–4.9)
ALBUMIN/GLOB SERPL: 1.3 G/DL
ALP SERPL-CCNC: 125 U/L (ref 30–99)
ALT SERPL-CCNC: 26 U/L (ref 2–50)
AMPHET UR QL SCN: POSITIVE
ANION GAP SERPL CALC-SCNC: 16 MMOL/L (ref 7–16)
ANION GAP SERPL CALC-SCNC: 19 MMOL/L (ref 7–16)
APPEARANCE UR: CLEAR
APTT PPP: 27.5 SEC (ref 24.7–36)
AST SERPL-CCNC: 29 U/L (ref 12–45)
BACTERIA #/AREA URNS HPF: NEGATIVE /HPF
BARBITURATES UR QL SCN: NEGATIVE
BASOPHILS # BLD AUTO: 0.4 % (ref 0–1.8)
BASOPHILS # BLD: 0.04 K/UL (ref 0–0.12)
BENZODIAZ UR QL SCN: NEGATIVE
BILIRUB SERPL-MCNC: 1.5 MG/DL (ref 0.1–1.5)
BILIRUB UR QL STRIP.AUTO: NEGATIVE
BUN SERPL-MCNC: 24 MG/DL (ref 8–22)
BUN SERPL-MCNC: 27 MG/DL (ref 8–22)
BZE UR QL SCN: NEGATIVE
CALCIUM ALBUM COR SERPL-MCNC: 9.2 MG/DL (ref 8.5–10.5)
CALCIUM SERPL-MCNC: 8.7 MG/DL (ref 8.5–10.5)
CALCIUM SERPL-MCNC: 9.1 MG/DL (ref 8.5–10.5)
CANNABINOIDS UR QL SCN: NEGATIVE
CHLORIDE SERPL-SCNC: 101 MMOL/L (ref 96–112)
CHLORIDE SERPL-SCNC: 105 MMOL/L (ref 96–112)
CK SERPL-CCNC: 303 U/L (ref 0–154)
CO2 SERPL-SCNC: 21 MMOL/L (ref 20–33)
CO2 SERPL-SCNC: 22 MMOL/L (ref 20–33)
COLOR UR: YELLOW
CORTIS SERPL-MCNC: 26.5 UG/DL (ref 0–23)
CREAT SERPL-MCNC: 1.45 MG/DL (ref 0.5–1.4)
CREAT SERPL-MCNC: 1.69 MG/DL (ref 0.5–1.4)
EKG IMPRESSION: NORMAL
EOSINOPHIL # BLD AUTO: 0.02 K/UL (ref 0–0.51)
EOSINOPHIL NFR BLD: 0.2 % (ref 0–6.9)
EPI CELLS #/AREA URNS HPF: NEGATIVE /HPF
ERYTHROCYTE [DISTWIDTH] IN BLOOD BY AUTOMATED COUNT: 49.4 FL (ref 35.9–50)
ETHANOL BLD-MCNC: <10.1 MG/DL
FENTANYL UR QL: NEGATIVE
FLUAV RNA SPEC QL NAA+PROBE: NEGATIVE
FLUBV RNA SPEC QL NAA+PROBE: NEGATIVE
GFR SERPLBLD CREATININE-BSD FMLA CKD-EPI: 44 ML/MIN/1.73 M 2
GFR SERPLBLD CREATININE-BSD FMLA CKD-EPI: 53 ML/MIN/1.73 M 2
GLOBULIN SER CALC-MCNC: 3.1 G/DL (ref 1.9–3.5)
GLUCOSE SERPL-MCNC: 125 MG/DL (ref 65–99)
GLUCOSE SERPL-MCNC: 95 MG/DL (ref 65–99)
GLUCOSE UR STRIP.AUTO-MCNC: NEGATIVE MG/DL
HCT VFR BLD AUTO: 49.8 % (ref 42–52)
HGB BLD-MCNC: 16.5 G/DL (ref 14–18)
HYALINE CASTS #/AREA URNS LPF: ABNORMAL /LPF
IMM GRANULOCYTES # BLD AUTO: 0.04 K/UL (ref 0–0.11)
IMM GRANULOCYTES NFR BLD AUTO: 0.4 % (ref 0–0.9)
INR PPP: 1.22 (ref 0.87–1.13)
KETONES UR STRIP.AUTO-MCNC: NEGATIVE MG/DL
LACTATE SERPL-SCNC: 1.6 MMOL/L (ref 0.5–2)
LACTATE SERPL-SCNC: 1.8 MMOL/L (ref 0.5–2)
LACTATE SERPL-SCNC: 2.3 MMOL/L (ref 0.5–2)
LEUKOCYTE ESTERASE UR QL STRIP.AUTO: NEGATIVE
LV EJECT FRACT MOD 2C 99903: 16.08
LV EJECT FRACT MOD 4C 99902: 33.15
LV EJECT FRACT MOD BP 99901: 27.66
LYMPHOCYTES # BLD AUTO: 0.61 K/UL (ref 1–4.8)
LYMPHOCYTES NFR BLD: 6.1 % (ref 22–41)
MAGNESIUM SERPL-MCNC: 2.1 MG/DL (ref 1.5–2.5)
MCH RBC QN AUTO: 29.5 PG (ref 27–33)
MCHC RBC AUTO-ENTMCNC: 33.1 G/DL (ref 32.3–36.5)
MCV RBC AUTO: 89.1 FL (ref 81.4–97.8)
METHADONE UR QL SCN: NEGATIVE
MICRO URNS: ABNORMAL
MONOCYTES # BLD AUTO: 0.62 K/UL (ref 0–0.85)
MONOCYTES NFR BLD AUTO: 6.2 % (ref 0–13.4)
NEUTROPHILS # BLD AUTO: 8.62 K/UL (ref 1.82–7.42)
NEUTROPHILS NFR BLD: 86.7 % (ref 44–72)
NITRITE UR QL STRIP.AUTO: NEGATIVE
NRBC # BLD AUTO: 0 K/UL
NRBC BLD-RTO: 0 /100 WBC (ref 0–0.2)
OPIATES UR QL SCN: NEGATIVE
OXYCODONE UR QL SCN: NEGATIVE
PCP UR QL SCN: NEGATIVE
PH UR STRIP.AUTO: 6.5 [PH] (ref 5–8)
PHOSPHATE SERPL-MCNC: 2.4 MG/DL (ref 2.5–4.5)
PLATELET # BLD AUTO: 281 K/UL (ref 164–446)
PMV BLD AUTO: 11 FL (ref 9–12.9)
POTASSIUM SERPL-SCNC: 3 MMOL/L (ref 3.6–5.5)
POTASSIUM SERPL-SCNC: 3 MMOL/L (ref 3.6–5.5)
PROPOXYPH UR QL SCN: NEGATIVE
PROT SERPL-MCNC: 7 G/DL (ref 6–8.2)
PROT UR QL STRIP: >=1000 MG/DL
PROTHROMBIN TIME: 15.5 SEC (ref 12–14.6)
RBC # BLD AUTO: 5.59 M/UL (ref 4.7–6.1)
RBC # URNS HPF: ABNORMAL /HPF
RBC UR QL AUTO: ABNORMAL
RSV RNA SPEC QL NAA+PROBE: NEGATIVE
SARS-COV-2 RNA RESP QL NAA+PROBE: NOTDETECTED
SODIUM SERPL-SCNC: 141 MMOL/L (ref 135–145)
SODIUM SERPL-SCNC: 143 MMOL/L (ref 135–145)
SP GR UR STRIP.AUTO: 1.02
SPECIMEN SOURCE: NORMAL
TROPONIN T SERPL-MCNC: 51 NG/L (ref 6–19)
TROPONIN T SERPL-MCNC: 55 NG/L (ref 6–19)
TSH SERPL DL<=0.005 MIU/L-ACNC: 2.57 UIU/ML (ref 0.38–5.33)
UROBILINOGEN UR STRIP.AUTO-MCNC: 1 MG/DL
WBC # BLD AUTO: 10 K/UL (ref 4.8–10.8)
WBC #/AREA URNS HPF: ABNORMAL /HPF

## 2024-03-24 PROCEDURE — 84443 ASSAY THYROID STIM HORMONE: CPT

## 2024-03-24 PROCEDURE — 83735 ASSAY OF MAGNESIUM: CPT

## 2024-03-24 PROCEDURE — 80053 COMPREHEN METABOLIC PANEL: CPT

## 2024-03-24 PROCEDURE — 99291 CRITICAL CARE FIRST HOUR: CPT | Performed by: INTERNAL MEDICINE

## 2024-03-24 PROCEDURE — 84100 ASSAY OF PHOSPHORUS: CPT

## 2024-03-24 PROCEDURE — 87077 CULTURE AEROBIC IDENTIFY: CPT

## 2024-03-24 PROCEDURE — 96368 THER/DIAG CONCURRENT INF: CPT

## 2024-03-24 PROCEDURE — 93306 TTE W/DOPPLER COMPLETE: CPT | Mod: 26 | Performed by: INTERNAL MEDICINE

## 2024-03-24 PROCEDURE — 700101 HCHG RX REV CODE 250: Performed by: EMERGENCY MEDICINE

## 2024-03-24 PROCEDURE — 85610 PROTHROMBIN TIME: CPT

## 2024-03-24 PROCEDURE — 36415 COLL VENOUS BLD VENIPUNCTURE: CPT

## 2024-03-24 PROCEDURE — 84484 ASSAY OF TROPONIN QUANT: CPT

## 2024-03-24 PROCEDURE — 99291 CRITICAL CARE FIRST HOUR: CPT

## 2024-03-24 PROCEDURE — 770022 HCHG ROOM/CARE - ICU (200)

## 2024-03-24 PROCEDURE — 700101 HCHG RX REV CODE 250: Performed by: INTERNAL MEDICINE

## 2024-03-24 PROCEDURE — 700111 HCHG RX REV CODE 636 W/ 250 OVERRIDE (IP): Performed by: INTERNAL MEDICINE

## 2024-03-24 PROCEDURE — 83605 ASSAY OF LACTIC ACID: CPT | Mod: 91

## 2024-03-24 PROCEDURE — 700101 HCHG RX REV CODE 250

## 2024-03-24 PROCEDURE — 87076 CULTURE ANAEROBE IDENT EACH: CPT

## 2024-03-24 PROCEDURE — 700105 HCHG RX REV CODE 258: Performed by: INTERNAL MEDICINE

## 2024-03-24 PROCEDURE — 0241U HCHG SARS-COV-2 COVID-19 NFCT DS RESP RNA 4 TRGT MIC: CPT

## 2024-03-24 PROCEDURE — 96365 THER/PROPH/DIAG IV INF INIT: CPT

## 2024-03-24 PROCEDURE — 82550 ASSAY OF CK (CPK): CPT

## 2024-03-24 PROCEDURE — 87015 SPECIMEN INFECT AGNT CONCNTJ: CPT

## 2024-03-24 PROCEDURE — 81001 URINALYSIS AUTO W/SCOPE: CPT

## 2024-03-24 PROCEDURE — 700111 HCHG RX REV CODE 636 W/ 250 OVERRIDE (IP): Performed by: EMERGENCY MEDICINE

## 2024-03-24 PROCEDURE — 87086 URINE CULTURE/COLONY COUNT: CPT

## 2024-03-24 PROCEDURE — 700105 HCHG RX REV CODE 258: Performed by: EMERGENCY MEDICINE

## 2024-03-24 PROCEDURE — 96366 THER/PROPH/DIAG IV INF ADDON: CPT

## 2024-03-24 PROCEDURE — 93306 TTE W/DOPPLER COMPLETE: CPT

## 2024-03-24 PROCEDURE — 93005 ELECTROCARDIOGRAM TRACING: CPT | Performed by: EMERGENCY MEDICINE

## 2024-03-24 PROCEDURE — 700105 HCHG RX REV CODE 258

## 2024-03-24 PROCEDURE — 85730 THROMBOPLASTIN TIME PARTIAL: CPT

## 2024-03-24 PROCEDURE — 96375 TX/PRO/DX INJ NEW DRUG ADDON: CPT

## 2024-03-24 PROCEDURE — 82533 TOTAL CORTISOL: CPT

## 2024-03-24 PROCEDURE — 87040 BLOOD CULTURE FOR BACTERIA: CPT

## 2024-03-24 PROCEDURE — 303747 HCHG EXTRA SUTURE

## 2024-03-24 PROCEDURE — 85025 COMPLETE CBC W/AUTO DIFF WBC: CPT

## 2024-03-24 PROCEDURE — 304999 HCHG REPAIR-SIMPLE/INTERMED LEVEL 1

## 2024-03-24 PROCEDURE — 304217 HCHG IRRIGATION SYSTEM

## 2024-03-24 PROCEDURE — 80307 DRUG TEST PRSMV CHEM ANLYZR: CPT

## 2024-03-24 PROCEDURE — 80048 BASIC METABOLIC PNL TOTAL CA: CPT

## 2024-03-24 PROCEDURE — 0HQ2XZZ REPAIR RIGHT EAR SKIN, EXTERNAL APPROACH: ICD-10-PCS | Performed by: EMERGENCY MEDICINE

## 2024-03-24 PROCEDURE — 82077 ASSAY SPEC XCP UR&BREATH IA: CPT

## 2024-03-24 RX ORDER — HYDRALAZINE HYDROCHLORIDE 20 MG/ML
10 INJECTION INTRAMUSCULAR; INTRAVENOUS ONCE
Status: COMPLETED | OUTPATIENT
Start: 2024-03-24 | End: 2024-03-24

## 2024-03-24 RX ORDER — LABETALOL HYDROCHLORIDE 5 MG/ML
10 INJECTION, SOLUTION INTRAVENOUS EVERY 4 HOURS PRN
Status: DISCONTINUED | OUTPATIENT
Start: 2024-03-24 | End: 2024-03-25

## 2024-03-24 RX ORDER — SODIUM CHLORIDE, SODIUM LACTATE, POTASSIUM CHLORIDE, CALCIUM CHLORIDE 600; 310; 30; 20 MG/100ML; MG/100ML; MG/100ML; MG/100ML
INJECTION, SOLUTION INTRAVENOUS CONTINUOUS
Status: DISCONTINUED | OUTPATIENT
Start: 2024-03-24 | End: 2024-03-25

## 2024-03-24 RX ORDER — SODIUM CHLORIDE, SODIUM LACTATE, POTASSIUM CHLORIDE, CALCIUM CHLORIDE 600; 310; 30; 20 MG/100ML; MG/100ML; MG/100ML; MG/100ML
1000 INJECTION, SOLUTION INTRAVENOUS ONCE
Status: COMPLETED | OUTPATIENT
Start: 2024-03-24 | End: 2024-03-24

## 2024-03-24 RX ORDER — POTASSIUM CHLORIDE 7.45 MG/ML
10 INJECTION INTRAVENOUS
Status: COMPLETED | OUTPATIENT
Start: 2024-03-24 | End: 2024-03-24

## 2024-03-24 RX ORDER — POTASSIUM CHLORIDE 29.8 MG/ML
40 INJECTION INTRAVENOUS ONCE
Status: DISCONTINUED | OUTPATIENT
Start: 2024-03-24 | End: 2024-03-24

## 2024-03-24 RX ORDER — AZITHROMYCIN 250 MG/1
500 TABLET, FILM COATED ORAL ONCE
Status: DISCONTINUED | OUTPATIENT
Start: 2024-03-24 | End: 2024-03-24

## 2024-03-24 RX ORDER — ENOXAPARIN SODIUM 100 MG/ML
40 INJECTION SUBCUTANEOUS DAILY
Status: DISCONTINUED | OUTPATIENT
Start: 2024-03-25 | End: 2024-04-03

## 2024-03-24 RX ORDER — LIDOCAINE HYDROCHLORIDE 20 MG/ML
20 INJECTION, SOLUTION INFILTRATION; PERINEURAL ONCE
Status: COMPLETED | OUTPATIENT
Start: 2024-03-24 | End: 2024-03-24

## 2024-03-24 RX ORDER — LABETALOL HYDROCHLORIDE 5 MG/ML
10 INJECTION, SOLUTION INTRAVENOUS EVERY 4 HOURS PRN
Status: DISCONTINUED | OUTPATIENT
Start: 2024-03-24 | End: 2024-03-24

## 2024-03-24 RX ORDER — ONDANSETRON 4 MG/1
4 TABLET, ORALLY DISINTEGRATING ORAL EVERY 4 HOURS PRN
Status: DISCONTINUED | OUTPATIENT
Start: 2024-03-24 | End: 2024-03-27

## 2024-03-24 RX ORDER — NOREPINEPHRINE BITARTRATE 0.03 MG/ML
INJECTION, SOLUTION INTRAVENOUS
Status: DISCONTINUED
Start: 2024-03-24 | End: 2024-03-24

## 2024-03-24 RX ORDER — DEXMEDETOMIDINE HYDROCHLORIDE 4 UG/ML
.1-1.5 INJECTION, SOLUTION INTRAVENOUS CONTINUOUS
Status: DISCONTINUED | OUTPATIENT
Start: 2024-03-24 | End: 2024-03-30

## 2024-03-24 RX ORDER — ONDANSETRON 2 MG/ML
4 INJECTION INTRAMUSCULAR; INTRAVENOUS EVERY 4 HOURS PRN
Status: DISCONTINUED | OUTPATIENT
Start: 2024-03-24 | End: 2024-04-05 | Stop reason: HOSPADM

## 2024-03-24 RX ORDER — PHENYLEPHRINE HCL IN 0.9% NACL 0.5 MG/5ML
SYRINGE (ML) INTRAVENOUS
Status: DISCONTINUED
Start: 2024-03-24 | End: 2024-03-24

## 2024-03-24 RX ADMIN — POTASSIUM CHLORIDE 10 MEQ: 7.46 INJECTION, SOLUTION INTRAVENOUS at 15:50

## 2024-03-24 RX ADMIN — POTASSIUM CHLORIDE 10 MEQ: 7.46 INJECTION, SOLUTION INTRAVENOUS at 14:42

## 2024-03-24 RX ADMIN — POTASSIUM CHLORIDE 10 MEQ: 7.46 INJECTION, SOLUTION INTRAVENOUS at 17:54

## 2024-03-24 RX ADMIN — SODIUM CHLORIDE 5 MG/HR: 9 INJECTION, SOLUTION INTRAVENOUS at 17:00

## 2024-03-24 RX ADMIN — THIAMINE HYDROCHLORIDE 500 MG: 100 INJECTION, SOLUTION INTRAMUSCULAR; INTRAVENOUS at 14:36

## 2024-03-24 RX ADMIN — LIDOCAINE HYDROCHLORIDE 20 ML: 20 INJECTION, SOLUTION INFILTRATION; PERINEURAL at 12:15

## 2024-03-24 RX ADMIN — SODIUM CHLORIDE 5 MG/HR: 9 INJECTION, SOLUTION INTRAVENOUS at 11:58

## 2024-03-24 RX ADMIN — DEXMEDETOMIDINE HYDROCHLORIDE 0.2 MCG/KG/HR: 100 INJECTION, SOLUTION INTRAVENOUS at 15:13

## 2024-03-24 RX ADMIN — DEXMEDETOMIDINE HYDROCHLORIDE 1 MCG/KG/HR: 100 INJECTION, SOLUTION INTRAVENOUS at 21:04

## 2024-03-24 RX ADMIN — POTASSIUM CHLORIDE 10 MEQ: 7.46 INJECTION, SOLUTION INTRAVENOUS at 16:58

## 2024-03-24 RX ADMIN — HYDRALAZINE HYDROCHLORIDE 10 MG: 20 INJECTION, SOLUTION INTRAMUSCULAR; INTRAVENOUS at 11:37

## 2024-03-24 RX ADMIN — POTASSIUM PHOSPHATE, MONOBASIC AND POTASSIUM PHOSPHATE, DIBASIC 30 MMOL: 224; 236 INJECTION, SOLUTION, CONCENTRATE INTRAVENOUS at 18:27

## 2024-03-24 RX ADMIN — SODIUM CHLORIDE, POTASSIUM CHLORIDE, SODIUM LACTATE AND CALCIUM CHLORIDE: 600; 310; 30; 20 INJECTION, SOLUTION INTRAVENOUS at 14:30

## 2024-03-24 RX ADMIN — AMPICILLIN AND SULBACTAM 3 G: 1; 2 INJECTION, POWDER, FOR SOLUTION INTRAMUSCULAR; INTRAVENOUS at 12:22

## 2024-03-24 RX ADMIN — SODIUM CHLORIDE, POTASSIUM CHLORIDE, SODIUM LACTATE AND CALCIUM CHLORIDE 1000 ML: 600; 310; 30; 20 INJECTION, SOLUTION INTRAVENOUS at 11:49

## 2024-03-24 ASSESSMENT — HEART SCORE
TROPONIN: 1-3 TIMES NORMAL LIMIT
AGE: 65+
HISTORY: SLIGHTLY SUSPICIOUS
ECG: NON-SPECIFIC REPOLARIZATION DISTURBANCE
RISK FACTORS: 1-2 RISK FACTORS
HEART SCORE: 5

## 2024-03-24 ASSESSMENT — FIBROSIS 4 INDEX
FIB4 SCORE: 0.83
FIB4 SCORE: 1.32
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
## 2024-03-24 NOTE — PROGRESS NOTES
4 Eyes Skin Assessment Completed by RIGOBERTO Guevara and RIGOBERTO Pa.    Head Redness  Ears Redness pt has cut on right ear with sutures   Nose Redness  Mouth WDL  Neck Redness  Breast/Chest Redness  Shoulder Blades Redness  Spine Redness  (R) Arm/Elbow/Hand Redness, Bruising, Abrasion, and Scab  (L) Arm/Elbow/Hand Redness, Bruising, Abrasion, and Scab  Abdomen Redness and Rash  Groin Redness and Rash  Scrotum/Coccyx/Buttocks Redness, Non-Blanching, and Discoloration possible DTI  (R) Leg Redness, Non-Blanching, and Abrasion  (L) Leg Redness, Non-Blanching, and Abrasion  (R) Heel/Foot/Toe Redness and Non-Blanching  (L) Heel/Foot/Toe Bruising redness       EAR      Pannus / groin       Groin       Sacrum      Left leg      Feet      Devices In Places ECG, Blood Pressure Cuff, Pulse Ox, Condom Cath, and Nasal Cannula      Interventions In Place Gray Ear Foams, Pillows, Q2 Turns, Low Air Loss Mattress, Barrier Cream, and Dri-Holden Pads    Possible Skin Injury Yes    Pictures Uploaded Into Epic Yes  Wound Consult Placed Yes  RN Wound Prevention Protocol Ordered Yes

## 2024-03-24 NOTE — ED NOTES
Patient unable to participate in interview at this time. [facility] to [facility] any [facility] in over 2 years.

## 2024-03-24 NOTE — ED PROVIDER NOTES
ED Provider Note    CHIEF COMPLAINT  Chief Complaint   Patient presents with    ALOC     Patient BIB EMS from home. Patient A/O x2 Person and place. Unknown last known well or baseline.     Head Injury     Blood on right side of head unknown last know well or time down at home. Patient found on ground by neighbor.     Hypertension     BP /150 given 5mg metoprolol IV by EMS per ERP orders.         EXTERNAL RECORDS REVIEWED  Inpatient Notes   Patient was admitted to the hospital 9/3/2022 at [facility] for an abscess of his left lower extremity and worsening wound infection.  He had left AMA after a surgical washout of the same wound IV antibiotic therapy and wound VAC placement    HPI/ROS  LIMITATION TO HISTORY   Select: Altered mental status / Confusion  OUTSIDE HISTORIAN(S):  EMS reports that his house was in disarray and he was covered in feces    Andrews Sanchez is a 63 y.o. male who presents brought in by EMS with altered mental status.  He lives alone and according to EMS his house was in disarray and he was covered in his own feces.  The patient is not oriented and does not remember the day the year or why he is here.  He does know he is in the hospital and is currently not complaining of any pain in his chest or head.  He does admit to smoking.  He is a very poor informant because of his altered mental status.    PAST MEDICAL HISTORY   has a past medical history of Hypercholesteremia, Hypertension, and Muscle disorder.    SURGICAL HISTORY   has a past surgical history that includes laminotomy (1990) and split grft,head,fac,hand,feet <100sqcm (Left, 9/14/2022).    FAMILY HISTORY  Family History   Problem Relation Age of Onset    Other Mother         heart stopped from morphine    Hypertension Father     Other Father         aortic aneurysm     Lung Disease Father         smoker       SOCIAL HISTORY  Social History     Tobacco Use    Smoking status: Every Day     Current  "packs/day: 0.20     Average packs/day: 0.2 packs/day for 16.0 years (3.2 ttl pk-yrs)     Types: Cigarettes    Smokeless tobacco: Never    Tobacco comments:     smokes 5-6 cigs/day    Substance and Sexual Activity    Alcohol use: No    Drug use: No    Sexual activity: Never       CURRENT MEDICATIONS  Home Medications       Reviewed by Hudson Patel (Pharmacy Tech) on 03/24/24 at 1154  Med List Status: Unable to Obtain     Medication Last Dose Status        Patient Arnol Taking any Medications                           ALLERGIES  Allergies   Allergen Reactions    Bee Venom Swelling       PHYSICAL EXAM  VITAL SIGNS: BP (!) 226/143   Pulse 66   Temp 36.6 °C (97.8 °F) (Temporal)   Resp 13   Ht 1.778 m (5' 10\")   Wt 101 kg (222 lb)   SpO2 93%   BMI 31.85 kg/m²      Constitutional: Well developed, Well nourished, unkempt non-toxic appearance.   HEENT: Normocephalic, positive for a 4 cm  laceration to his right ear at the pinna with some mild bleeding external ears normal, pharynx pink,  Mucous  Membranes moist, No rhinorrhea or mucosal edema  Eyes: PERRL, EOMI, Conjunctiva normal, No discharge.   Neck: Normal range of motion, No tenderness, Supple, No stridor.   Lymphatic: No lymphadenopathy    Cardiovascular: Regular Rate and Rhythm, No murmurs,  rubs, or gallops.   Thorax & Lungs: Lungs clear to auscultation bilaterally, No respiratory distress, No wheezes, rhales or rhonchi, No chest wall tenderness.   Abdomen: Evaded BMI bowel sounds normal, Soft, non tender, non distended,  No pulsatile masses., no rebound guarding or peritoneal signs.   Skin: Warm, Dry, No erythema, No rash,   Back:  No CVA tenderness,  No spinal tenderness, bony crepitance step offs or instability.   Extremities: Equal, intact distal pulses, No cyanosis, clubbing or edema,  No tenderness.   Musculoskeletal: Good range of motion in all major joints. No tenderness to palpation or major deformities noted.   Neurologic: Alert & confused no " focal deficits noted.        DIAGNOSTIC STUDIES / PROCEDURES  EKG  I have independently interpreted this EKG  See below    LABS  Results for orders placed or performed during the hospital encounter of 03/24/24   CBC WITH DIFFERENTIAL   Result Value Ref Range    WBC 10.0 4.8 - 10.8 K/uL    RBC 5.59 4.70 - 6.10 M/uL    Hemoglobin 16.5 14.0 - 18.0 g/dL    Hematocrit 49.8 42.0 - 52.0 %    MCV 89.1 81.4 - 97.8 fL    MCH 29.5 27.0 - 33.0 pg    MCHC 33.1 32.3 - 36.5 g/dL    RDW 49.4 35.9 - 50.0 fL    Platelet Count 281 164 - 446 K/uL    MPV 11.0 9.0 - 12.9 fL    Neutrophils-Polys 86.70 (H) 44.00 - 72.00 %    Lymphocytes 6.10 (L) 22.00 - 41.00 %    Monocytes 6.20 0.00 - 13.40 %    Eosinophils 0.20 0.00 - 6.90 %    Basophils 0.40 0.00 - 1.80 %    Immature Granulocytes 0.40 0.00 - 0.90 %    Nucleated RBC 0.00 0.00 - 0.20 /100 WBC    Neutrophils (Absolute) 8.62 (H) 1.82 - 7.42 K/uL    Lymphs (Absolute) 0.61 (L) 1.00 - 4.80 K/uL    Monos (Absolute) 0.62 0.00 - 0.85 K/uL    Eos (Absolute) 0.02 0.00 - 0.51 K/uL    Baso (Absolute) 0.04 0.00 - 0.12 K/uL    Immature Granulocytes (abs) 0.04 0.00 - 0.11 K/uL    NRBC (Absolute) 0.00 K/uL   COMP METABOLIC PANEL   Result Value Ref Range    Sodium 141 135 - 145 mmol/L    Potassium 3.0 (L) 3.6 - 5.5 mmol/L    Chloride 101 96 - 112 mmol/L    Co2 21 20 - 33 mmol/L    Anion Gap 19.0 (H) 7.0 - 16.0    Glucose 125 (H) 65 - 99 mg/dL    Bun 27 (H) 8 - 22 mg/dL    Creatinine 1.69 (H) 0.50 - 1.40 mg/dL    Calcium 9.1 8.5 - 10.5 mg/dL    Correct Calcium 9.2 8.5 - 10.5 mg/dL    AST(SGOT) 29 12 - 45 U/L    ALT(SGPT) 26 2 - 50 U/L    Alkaline Phosphatase 125 (H) 30 - 99 U/L    Total Bilirubin 1.5 0.1 - 1.5 mg/dL    Albumin 3.9 3.2 - 4.9 g/dL    Total Protein 7.0 6.0 - 8.2 g/dL    Globulin 3.1 1.9 - 3.5 g/dL    A-G Ratio 1.3 g/dL   TROPONIN   Result Value Ref Range    Troponin T 51 (H) 6 - 19 ng/L   PRTOTHROMBIN TIME (INR)   Result Value Ref Range    PT 15.5 (H) 12.0 - 14.6 sec    INR 1.22 (H) 0.87 -  1.13   APTT   Result Value Ref Range    APTT 27.5 24.7 - 36.0 sec   URINE DRUG SCREEN   Result Value Ref Range    Amphetamines Urine Positive (A) Negative    Barbiturates Negative Negative    Benzodiazepines Negative Negative    Cocaine Metabolite Negative Negative    Fentanyl, Urine Negative Negative    Methadone Negative Negative    Opiates Negative Negative    Oxycodone Negative Negative    Phencyclidine -Pcp Negative Negative    Propoxyphene Negative Negative    Cannabinoid Metab Negative Negative   DIAGNOSTIC ALCOHOL   Result Value Ref Range    Diagnostic Alcohol <10.1 <10.1 mg/dL   CORTISOL   Result Value Ref Range    Cortisol 26.5 (H) 0.0 - 23.0 ug/dL   TSH WITH REFLEX TO FT4   Result Value Ref Range    TSH 2.570 0.380 - 5.330 uIU/mL   Lactic Acid   Result Value Ref Range    Lactic Acid 2.3 (H) 0.5 - 2.0 mmol/L   Urinalysis    Specimen: Urine   Result Value Ref Range    Color Yellow     Character Clear     Specific Gravity 1.018 <1.035    Ph 6.5 5.0 - 8.0    Glucose Negative Negative mg/dL    Ketones Negative Negative mg/dL    Protein >=1000 (A) Negative mg/dL    Bilirubin Negative Negative    Urobilinogen, Urine 1.0 Negative    Nitrite Negative Negative    Leukocyte Esterase Negative Negative    Occult Blood Small (A) Negative    Micro Urine Req Microscopic    CoV-2, FLU A/B, and RSV by PCR (2-4 Hours CEPHEID) : Collect NP swab in VTM    Specimen: Respirate   Result Value Ref Range    Influenza virus A RNA Negative Negative    Influenza virus B, PCR Negative Negative    RSV, PCR Negative Negative    SARS-CoV-2 by PCR NotDetected     SARS-CoV-2 Source NP Swab    URINE MICROSCOPIC (W/UA)   Result Value Ref Range    WBC 0-2 (A) /hpf    RBC 2-5 (A) /hpf    Bacteria Negative None /hpf    Epithelial Cells Negative /hpf    Hyaline Cast 0-2 /lpf   ESTIMATED GFR   Result Value Ref Range    GFR (CKD-EPI) 44 (A) >60 mL/min/1.73 m 2   EKG   Result Value Ref Range    Report       [facility] Emergency  Dept.    Test Date:  2024  Pt Name:    JAMISON PRADO               Department: ER  MRN:        [id number]                      Room:       RD 08  Gender:     Male                         Technician: [id number]  :        [id number]                   Requested By:ER TRIAGE PROTOCOL  Order #:    [id number]                    Reading MD: AIDAN CHAVES MD    Measurements  Intervals                                Axis  Rate:       61                           P:          18  KY:         198                          QRS:        -15  QRSD:       110                          T:          128  QT:         474  QTc:        478    Interpretive Statements  Sinus rhythm  Left atrial enlargement  LVH with IVCD and secondary repol abnrm  Borderline prolonged QT interval  No previous ECG available for comparison  Electronically Signed On 2024 11:01:02 PDT by AIDAN CHAVES MD          RADIOLOGY  I have independently interpreted the diagnostic imaging associated with this visit and am waiting the final reading from the radiologist.   My preliminary interpretation is as follows: Chest x-ray positive left lower lobe infiltrate  Radiologist interpretation:   CT-HEAD W/O   Final Result   Addendum (preliminary) 1 of 1   VOALTE message of critical findings sent to Dr. Chaves at 3/24/2024 11:37    AM. I also received confirmation of receipt of VOALTE message back from    the provider.      Final      1.  2 mm focus of hyperdensity in the left frontal white matter. This could be a small hemorrhage versus other etiology as above.   2.  Advanced confluent nonspecific white matter hypoattenuation which is markedly abnormal. Recommend follow-up with brain MRI study.      Based solely on CT findings, the brain injury guideline category is mBIG 1.      SDH < 4mm   IPH < 4mm   SAH < 3 sulci and < 1mm      The original BIG retrospective analysis found radiographic progression in 0% of BIG 1 patients and 2.6% BIG 2.      Efforts are underway to  contact referring physician with results at time of dictation.      DX-CHEST-PORTABLE (1 VIEW)   Final Result         Hazy bibasilar opacities, left more than right, atelectasis or infection.      EC-ECHOCARDIOGRAM COMPLETE W/ CONT    (Results Pending)   MR-BRAIN-W/O    (Results Pending)         COURSE & MEDICAL DECISION MAKING    ED Observation Status? No; Patient does not meet criteria for ED Observation.     INITIAL ASSESSMENT, COURSE AND PLAN  Care Narrative: Andrews Sanchez is a 63 y.o. male who presents brought in by EMS with altered mental status.  He lives alone and according to EMS his house was in disarray and he was covered in his own feces.  The patient is not oriented and does not remember the day the year or why he is here.  He does know he is in the hospital and is currently not complaining of any pain in his chest or head.  He does admit to smoking.  He is a very poor informant because of his altered mental status.  On physical exam he is alert and oriented x 1.  He is moving all extremities and follows commands.  He has a laceration to his right ear over the pinna.  He has no scalp contusions or abrasions.  His lungs are clear to auscultation bilaterally heart is regular rate and rhythm no murmurs rubs or gallops abdomen is soft and nontender he has no extremity edema.  He has full range of motion of all extremities and his cranial nerves are intact.  NIH is 1 just because he is disoriented.        ADDITIONAL PROBLEM LIST  Left lower extremity wound and abscess  Essential hypertension hypoxic respiratory failure  Tobacco use  DISPOSITION AND DISCUSSIONS    An IV was started and labs were drawn.  EMS gave him a dose of IV Lopressor 5 mg because his initial blood pressure is 260/150.  His blood pressure is now 226/143.  I have ordered IV hydralazine.  CT of the head was ordered along with lab work to further evaluate his symptoms.  The patient CT head showed a 2 mm focus of hyperdensity in the  left frontal white matter that could be a small hemorrhage versus other etiology he has no skull fractures MRI of the brain for follow-up is recommended.  Chest x-ray shows bibasilar opacities left worse than right concerning for pneumonia.  Urinalysis has small blood 0-2 white cells but no bacteria and no ketones.  INR is elevated at 1.22 PT 1.55.  Troponin is slightly elevated at 51.  Diagnostic alcohol is less than 10.  Comprehensive metabolic panel is a low potassium of 3.0 anion gap is elevated at 19 glucose is elevated at 125 his kidney function has an elevated BUN at 27 and a creatinine of 1.69 indicating acute kidney injury.  His liver function tests are normal his alk phos is slightly elevated at 125.  Bilirubin is normal.  Patient's white count is normal at 10 hemoglobin 16.5 platelet count 281 with 86% neutrophils.    Give the patient a dose of IV hydralazine which brought his blood pressure down to 169/114.  After I got the call from the radiologist I started him on nicardipine drip.  I have also ordered lactated Ringer's to help replace his potassium.  I will repaired patient's ear laceration please see note below.  His EKG shows sinus rhythm with left atrial enlargement and interventricular conduction delay.  His heart score is 5.    I have spoken with neurology who does not think the small intracranial hemorrhage is contributing to his altered mental status.  I will admit him to the intensive care unit for MRI of the brain blood pressure control antibiotics and evaluation of his kidney injury and electrolyte abnormalities.  The patient will be admitted in serious condition.    Laceration Repair Procedure Note    Indication: Laceration    Procedure: The patient was placed in the appropriate position and anesthesia around the laceration was obtained by infiltration using 1% Lidocaine without epinephrine. The wound was minimally contaminated .The area was then cleansed with betadine and draped in a  sterile fashion and irrigated with normal saline. The laceration was closed with 5-0 Ethilon using interrupted sutures. There were no additional lacerations requiring repair. The wound area was then dressed with a sterile dressing.      Total repaired wound length: 4 cm.     Other Items: Suture count: 5    The patient tolerated the procedure with difficulty.    Complications: None      I have discussed management of the patient with the following physicians and ROSEANN's:  intensivist Dr Erickson will admit the patient to the ICU for blood pressure control antibiotics and further care  Neurologist Dr. Ortega he states that this small abnormality is not what is causing his altered mental status    Discussion of management with other [facility] or appropriate source(s): Pharmacy to start a nicardipine drip and Radiologist who stated that there is a small 2 mm punctate in the left subcortical area of his brain.    Escalation of care considered, and ultimately not performed: None    Barriers to care at this time, including but not limited to: none.     Decision tools and prescription drugs considered including, but not limited to: Antibiotics Ancef and azithromycin, NIH Stroke Scale 1, and HEART Score 5 .    CRITICAL CARE  The very real possibilty of a deterioration of this patient's condition required the highest level of my preparedness for sudden, emergent intervention.  I provided critical care services, which included medication orders, frequent reevaluations of the patient's condition and response to treatment, ordering and reviewing test results, and discussing the case with various consultants.  The critical care time associated with the care of the patient was 45 minutes. Review chart for interventions. This time is exclusive of any other billable procedures.     FINAL DIAGNOSIS  1. Hypertensive emergency    2. Nontraumatic hemorrhage of left cerebral hemisphere (HCC)    3. Encephalopathy    4. Pneumonia of left lower lobe  due to infectious organism    5. Hypokalemia    6. FRANKLYN (acute kidney injury) (HCC)    7. Ear lobe laceration, right, initial encounter           Electronically signed by: Maribell Huizar M.D., 3/24/2024 10:58 AM

## 2024-03-24 NOTE — H&P
Critical Care/Pulmonary Consultation    Date of Service: 3/24/2024    Date of Admission:  3/24/2024 10:37 AM    Consulting Physician: India Acosta M.D.    Chief Complaint:  ALOC (Patient BIB EMS from home. Patient A/O x2 Person and place. Unknown last known well or baseline. ), Head Injury (Blood on right side of head unknown last know well or time down at home. Patient found on ground by neighbor. ), and Hypertension (BP /150 given 5mg metoprolol IV by EMS per ERP orders.  )      History of Present Illness: Andrews Sanchez is a 63 y.o. male with a past medical history of hypertension and previous admissions for hypertensive crisis and wound care. Patient is unable to provide history; social history, medical history, and family history are obtained from chart review.   Patient was found down for an unknown amount of time covered in feces. Patient was brought in by ambulance, with EMS reporting blood pressures of 260/150. He was administered 5mg IV metoprolol.   Blood pressure was read at 226/143 upon arrival to [facility]. Patient is AxO x 1; malodorous, disheveled with bilateral scleral injection. Patient had chest xray revealing concern for infection vs atelectasis and was treated empirically with unasyn and azithromycin. He underwent CT head, notable for 2mm focus hyperdensity; vascular neurology consulted in ED, think it is an incidental finding and not likely an intraparenchymal hemorrhage.     Review of Systems   Unable to perform ROS: Mental status change       Home Medications       Reviewed by Hudson Patel (Pharmacy Tech) on 03/24/24 at 1154  Med List Status: Unable to Obtain     Medication Last Dose Status        Patient Arnol Taking any Medications                           Social History     Tobacco Use    Smoking status: Every Day     Current packs/day: 0.20     Average packs/day: 0.2 packs/day for 16.0 years (3.2 ttl pk-yrs)     Types: Cigarettes    Smokeless tobacco: Never    Tobacco  "comments:     smokes 5-6 cigs/day    Substance Use Topics    Alcohol use: No    Drug use: No        Past Medical History:   Diagnosis Date    Hypercholesteremia     Hypertension     Muscle disorder     L4-5 injury, cervical surgery        Past Surgical History:   Procedure Laterality Date    OH SPLIT GRFT,HEAD,FAC,HAND,FEET <100SQCM Left 9/14/2022    Procedure: LEFT THIGH TO LEG SPLIT THICKNESS SKIN GRAFT;  Surgeon: Taqueria Guerrier M.D.;  Location: [facility];  Service: Orthopedics    LAMINOTOMY  1990    L4-5-S1 fusion        Allergies: Bee venom    Family History   Problem Relation Age of Onset    Other Mother         heart stopped from morphine    Hypertension Father     Other Father         aortic aneurysm     Lung Disease Father         smoker       Vitals:    03/24/24 1045 03/24/24 1046 03/24/24 1100 03/24/24 1132   Height: 1.778 m (5' 10\")      Weight: 101 kg (222 lb)      Weight % change since last entry.: 0 %      BP: (!) 226/143   (!) 251/203   Pulse: 77 66 67 73   BMI (Calculated): 31.85      Resp: 13  16 18   Temp: 36.6 °C (97.8 °F)      TempSrc: Temporal       03/24/24 1139 03/24/24 1213 03/24/24 1233 03/24/24 1248   Height:       Weight:       Weight % change since last entry.:       BP: (!) 169/114 (!) 216/137 (!) 189/95 (!) 192/103   Pulse: (!) 58 69 72 69   BMI (Calculated):       Resp: 16 20 20 20   Temp:       TempSrc:        03/24/24 1304 03/24/24 1333   Height:     Weight:     Weight % change since last entry.:     BP: (!) 162/83 (!) 162/67   Pulse: 67 69   BMI (Calculated):     Resp: 18 20   Temp:     TempSrc:         Physical Examination  General: malodorous, disheveled, appears comfortable lying in bed   Neuro/Psych: moves all 4 extremities; unable to assess psych as patient was disoriented   HEENT: NC/AT, EOMI; PERRLA; bilateral scleral injection; moist mucous membraines; nasal cannula in place   CVS: RRR, no m/r/g appreciated   Respiratory: CTAB in upper and " middle lung fields, expiratory wheezes heard in bilateral lower lung fields   Abdomen/: soft, nontender to palpation, normal bowel sounds   Extremities: no LE edema   Skin: no rashes or skin lesions noted     No intake or output data in the 24 hours ending 03/24/24 1441    Recent Labs     03/24/24  1045   WBC 10.0   NEUTSPOLYS 86.70*   LYMPHOCYTES 6.10*   MONOCYTES 6.20   EOSINOPHILS 0.20   BASOPHILS 0.40   ASTSGOT 29   ALTSGPT 26   ALKPHOSPHAT 125*   TBILIRUBIN 1.5     Recent Labs     03/24/24  1045   SODIUM 141   POTASSIUM 3.0*   CHLORIDE 101   CO2 21   BUN 27*   CREATININE 1.69*   CALCIUM 9.1     Recent Labs     03/24/24  1045   ALTSGPT 26   ASTSGOT 29   ALKPHOSPHAT 125*   TBILIRUBIN 1.5   GLUCOSE 125*         CT-HEAD W/O   Final Result   Addendum (preliminary) 1 of 1   VOALTE message of critical findings sent to Dr. Huizar at 3/24/2024 11:37    AM. I also received confirmation of receipt of VOALTE message back from    the provider.      Final      1.  2 mm focus of hyperdensity in the left frontal white matter. This could be a small hemorrhage versus other etiology as above.   2.  Advanced confluent nonspecific white matter hypoattenuation which is markedly abnormal. Recommend follow-up with brain MRI study.      Based solely on CT findings, the brain injury guideline category is mBIG 1.      SDH < 4mm   IPH < 4mm   SAH < 3 sulci and < 1mm      The original BIG retrospective analysis found radiographic progression in 0% of BIG 1 patients and 2.6% BIG 2.      Efforts are underway to contact referring physician with results at time of dictation.      DX-CHEST-PORTABLE (1 VIEW)   Final Result         Hazy bibasilar opacities, left more than right, atelectasis or infection.      EC-ECHOCARDIOGRAM COMPLETE W/ CONT    (Results Pending)   MR-BRAIN-W/O    (Results Pending)       Patient Active Problem List   Diagnosis    Chronic low back pain    Hyperlipidemia    Essential hypertension    Obesity (BMI 30.0-34.9)     Insomnia    Sedative, hypnotic or anxiolytic dependence, continuous    Tobacco use    Occult blood positive stool    Positive urine drug screen    Acute leg pain, left    Hypertensive crisis       Assessment and Plan:  Mr. Andrews Sanchez is a 63 year old male that presents after being found down for an unknown amount of time in his feces with altered mental status and hypertensive crisis.     #Hypertensive crisis   History of htn, no home meds on chart   UDS positive for amphetamines   -continue nicardipine drip with goal of -185mmhg   -TTE pending     #Hypoxic respiratory failure   Currently on 3LPM supplementary oxygen by nasal cannula  Chest xray reveals pulmonary vascular congestion; likely pulmonary edema  -discontinue antibiotics  -continue to monitor     #Acute encephalopathy  Unclear of baseline mental status.  CT head revealing 2mm focus hyperdensity (vascular neurology evaluated patient in ED and think finding is incidental rather than intraparenchymal hemorrhage).  PCR flu/covid negative   UDS positive for amphetamines   Differential dx:  PRES (Posterior reversible encephalopathy syndrome), toxic metabolic encephalopathy, wernicke's encephalopathy   -MRI brain w/o contrast pending   -start IV thiamine 500mg three days followed by 100mg oral daily   -f/u blood cultures and urine cultures (collected and pending)  -continue to monitor     #FRANKLYN   Unclear of baseline creatinine, CR today is 1.69, egfr 44  -renally dose medications  -avoid nephrotoxic agents  -LR 75cc/hr     #Abnormal CNS imaging   -CT head w/o contrast revealing 2mm focus hyperdensity in left frontal white matter; vascular neurology evaluated patient in ED and think this is an incidental finding and not likely intraparenchymal hemorrhage.      #Hypokalemia   #Hypophosphatemia  -IV repletion with goal of K 4 and phos 3  -monitor and replete with daily cmps      Karina Mejia M.D.  [facility]

## 2024-03-24 NOTE — ED TRIAGE NOTES
Chief Complaint   Patient presents with    ALOC     Patient BIB EMS from home. Patient A/O x2 Person and place. Unknown last known well or baseline.     Head Injury     Blood on right side of head unknown last know well or time down at home. Patient found on ground by neighbor.     Hypertension     BP /150 given 5mg metoprolol IV by EMS per ERP orders.

## 2024-03-24 NOTE — ED NOTES
Received bedside report from Carolann FRANKLIN. Pt started on nicardipine gtt.    Resource rn attempting US IV

## 2024-03-24 NOTE — ED NOTES
Pt transported by [facility] rapid RN up to room on continuous cardiac monitor and 3 L oxygen NC. Pt transported on gurney with all of belongings

## 2024-03-24 NOTE — PROGRESS NOTES
63F ho HTN found down, possibly by neighbor, reportedly in feces.  Unknown LKN.  Lac to ear.  SBP  260/150 with EMS, given metoprolol 5.  226/143 here.  Got hydralazine now cardene.  CTH with bleed vs calcification, neuro unconcerned about bleed but recommending MRI brain to eval possibility of cavernous malformation.     Exam: confused, moaning, slurring unintelligible words.  Moves all 4 but not following with a neuro exam. No obvious focal deficit. RRR no r/m/g. Clear lungs. Trace edema. Lac to R ear.     #HTN crisis: Likely d/t meth in context of chronic HTN. Suspect med noncompliance given documented history in [facility]'s admission notes. Nicardipine for -186 (25% decrease from admission) for next 24 hours, then can lower goal further. TTE ordered    #acute hypoxic respiratory failure: likely pulmonary edema d/t HTN crisis. Lower suspicion for PNA.  No additional antibiotics unless developed fever, leukocytosis, worsening infiltrate/hypoxia, etc.     #Acute encephalopathy: ddx meth intoxication, PRES. Non focal so less likely stroke. Mild to moderate agitation, pulling at lines, not redirectable. Precedex infusion if needed. Soft restraints.     #abnormal CNS imaging: Possible cavernous malformation.  MRI brain ordered.  That would also show PRES    #FRANKLYN: Likely related to HTN crisis and hypovolemia. Check CK. LR infusion as he's likely hypovolemic from autodiuresis due to hypertensive crisis    #hypokalemia: replete and trend    #ear lac: repair by ERP    The patient remains critically ill on a nicardipine infusion.  Critical care time = 60 minutes in directly providing and coordinating critical care and extensive data review.  No time overlap and excludes procedures.

## 2024-03-25 ENCOUNTER — APPOINTMENT (OUTPATIENT)
Dept: RADIOLOGY | Facility: MEDICAL CENTER | Age: 64
DRG: 304 | End: 2024-03-25
Attending: INTERNAL MEDICINE
Payer: COMMERCIAL

## 2024-03-25 PROBLEM — R94.31 PROLONGED Q-T INTERVAL ON ECG: Status: ACTIVE | Noted: 2024-01-01

## 2024-03-25 PROBLEM — N17.9 AKI (ACUTE KIDNEY INJURY) (HCC): Status: ACTIVE | Noted: 2024-01-01

## 2024-03-25 PROBLEM — I50.20 HFREF (HEART FAILURE WITH REDUCED EJECTION FRACTION) (HCC): Status: ACTIVE | Noted: 2024-01-01

## 2024-03-25 PROBLEM — G93.41 ACUTE METABOLIC ENCEPHALOPATHY: Status: ACTIVE | Noted: 2024-01-01

## 2024-03-25 PROBLEM — E83.39 HYPOPHOSPHATEMIA: Status: ACTIVE | Noted: 2024-01-01

## 2024-03-25 PROBLEM — J96.01 ACUTE HYPOXIC RESPIRATORY FAILURE (HCC): Status: ACTIVE | Noted: 2024-01-01

## 2024-03-25 PROBLEM — R79.89 ELEVATED TROPONIN: Status: ACTIVE | Noted: 2024-01-01

## 2024-03-25 PROBLEM — E87.6 HYPOKALEMIA: Status: ACTIVE | Noted: 2024-03-25

## 2024-03-25 PROBLEM — R90.89 ABNORMAL IMAGING OF CENTRAL NERVOUS SYSTEM: Status: ACTIVE | Noted: 2024-01-01

## 2024-03-25 LAB
ANION GAP SERPL CALC-SCNC: 14 MMOL/L (ref 7–16)
BUN SERPL-MCNC: 21 MG/DL (ref 8–22)
BUN SERPL-MCNC: 22 MG/DL (ref 8–22)
BUN SERPL-MCNC: 23 MG/DL (ref 8–22)
CALCIUM SERPL-MCNC: 8 MG/DL (ref 8.5–10.5)
CALCIUM SERPL-MCNC: 8.3 MG/DL (ref 8.5–10.5)
CALCIUM SERPL-MCNC: 8.4 MG/DL (ref 8.5–10.5)
CHLORIDE SERPL-SCNC: 106 MMOL/L (ref 96–112)
CHLORIDE SERPL-SCNC: 106 MMOL/L (ref 96–112)
CHLORIDE SERPL-SCNC: 107 MMOL/L (ref 96–112)
CO2 SERPL-SCNC: 21 MMOL/L (ref 20–33)
CO2 SERPL-SCNC: 22 MMOL/L (ref 20–33)
CO2 SERPL-SCNC: 23 MMOL/L (ref 20–33)
CREAT SERPL-MCNC: 1.42 MG/DL (ref 0.5–1.4)
CREAT SERPL-MCNC: 1.52 MG/DL (ref 0.5–1.4)
CREAT SERPL-MCNC: 1.6 MG/DL (ref 0.5–1.4)
EKG IMPRESSION: NORMAL
ERYTHROCYTE [DISTWIDTH] IN BLOOD BY AUTOMATED COUNT: 50 FL (ref 35.9–50)
GFR SERPLBLD CREATININE-BSD FMLA CKD-EPI: 47 ML/MIN/1.73 M 2
GFR SERPLBLD CREATININE-BSD FMLA CKD-EPI: 50 ML/MIN/1.73 M 2
GFR SERPLBLD CREATININE-BSD FMLA CKD-EPI: 54 ML/MIN/1.73 M 2
GLUCOSE SERPL-MCNC: 72 MG/DL (ref 65–99)
GLUCOSE SERPL-MCNC: 89 MG/DL (ref 65–99)
GLUCOSE SERPL-MCNC: 99 MG/DL (ref 65–99)
HCT VFR BLD AUTO: 44 % (ref 42–52)
HGB BLD-MCNC: 14.4 G/DL (ref 14–18)
MCH RBC QN AUTO: 29.7 PG (ref 27–33)
MCHC RBC AUTO-ENTMCNC: 32.7 G/DL (ref 32.3–36.5)
MCV RBC AUTO: 90.7 FL (ref 81.4–97.8)
PLATELET # BLD AUTO: 182 K/UL (ref 164–446)
PMV BLD AUTO: 11.3 FL (ref 9–12.9)
POTASSIUM SERPL-SCNC: 3.6 MMOL/L (ref 3.6–5.5)
POTASSIUM SERPL-SCNC: 3.6 MMOL/L (ref 3.6–5.5)
POTASSIUM SERPL-SCNC: 4.1 MMOL/L (ref 3.6–5.5)
RBC # BLD AUTO: 4.85 M/UL (ref 4.7–6.1)
SODIUM SERPL-SCNC: 141 MMOL/L (ref 135–145)
SODIUM SERPL-SCNC: 143 MMOL/L (ref 135–145)
SODIUM SERPL-SCNC: 143 MMOL/L (ref 135–145)
TROPONIN T SERPL-MCNC: 61 NG/L (ref 6–19)
TROPONIN T SERPL-MCNC: 63 NG/L (ref 6–19)
TROPONIN T SERPL-MCNC: 63 NG/L (ref 6–19)
WBC # BLD AUTO: 10.9 K/UL (ref 4.8–10.8)

## 2024-03-25 PROCEDURE — 700101 HCHG RX REV CODE 250: Performed by: INTERNAL MEDICINE

## 2024-03-25 PROCEDURE — 99291 CRITICAL CARE FIRST HOUR: CPT | Performed by: INTERNAL MEDICINE

## 2024-03-25 PROCEDURE — 84484 ASSAY OF TROPONIN QUANT: CPT

## 2024-03-25 PROCEDURE — 700111 HCHG RX REV CODE 636 W/ 250 OVERRIDE (IP): Mod: JZ | Performed by: INTERNAL MEDICINE

## 2024-03-25 PROCEDURE — 80048 BASIC METABOLIC PNL TOTAL CA: CPT | Mod: 91

## 2024-03-25 PROCEDURE — 770022 HCHG ROOM/CARE - ICU (200)

## 2024-03-25 PROCEDURE — 700111 HCHG RX REV CODE 636 W/ 250 OVERRIDE (IP): Performed by: INTERNAL MEDICINE

## 2024-03-25 PROCEDURE — 93010 ELECTROCARDIOGRAM REPORT: CPT | Performed by: INTERNAL MEDICINE

## 2024-03-25 PROCEDURE — 93005 ELECTROCARDIOGRAM TRACING: CPT

## 2024-03-25 PROCEDURE — 700105 HCHG RX REV CODE 258: Performed by: INTERNAL MEDICINE

## 2024-03-25 PROCEDURE — 85027 COMPLETE CBC AUTOMATED: CPT

## 2024-03-25 PROCEDURE — 74018 RADEX ABDOMEN 1 VIEW: CPT

## 2024-03-25 PROCEDURE — 700111 HCHG RX REV CODE 636 W/ 250 OVERRIDE (IP)

## 2024-03-25 RX ORDER — POTASSIUM CHLORIDE 7.45 MG/ML
10 INJECTION INTRAVENOUS
Status: COMPLETED | OUTPATIENT
Start: 2024-03-25 | End: 2024-03-25

## 2024-03-25 RX ORDER — POTASSIUM CHLORIDE 20 MEQ/1
40 TABLET, EXTENDED RELEASE ORAL ONCE
Status: DISCONTINUED | OUTPATIENT
Start: 2024-03-25 | End: 2024-03-25

## 2024-03-25 RX ORDER — SODIUM CHLORIDE 9 MG/ML
INJECTION, SOLUTION INTRAVENOUS CONTINUOUS
Status: DISCONTINUED | OUTPATIENT
Start: 2024-03-26 | End: 2024-03-26

## 2024-03-25 RX ORDER — MIDAZOLAM HYDROCHLORIDE 1 MG/ML
0.5 INJECTION INTRAMUSCULAR; INTRAVENOUS ONCE
Status: COMPLETED | OUTPATIENT
Start: 2024-03-25 | End: 2024-03-25

## 2024-03-25 RX ORDER — HYDRALAZINE HYDROCHLORIDE 20 MG/ML
10 INJECTION INTRAMUSCULAR; INTRAVENOUS EVERY 4 HOURS PRN
Status: DISCONTINUED | OUTPATIENT
Start: 2024-03-25 | End: 2024-03-26

## 2024-03-25 RX ADMIN — POTASSIUM CHLORIDE 10 MEQ: 7.46 INJECTION, SOLUTION INTRAVENOUS at 19:57

## 2024-03-25 RX ADMIN — POTASSIUM CHLORIDE 10 MEQ: 7.46 INJECTION, SOLUTION INTRAVENOUS at 02:45

## 2024-03-25 RX ADMIN — SODIUM CHLORIDE, POTASSIUM CHLORIDE, SODIUM LACTATE AND CALCIUM CHLORIDE: 600; 310; 30; 20 INJECTION, SOLUTION INTRAVENOUS at 22:09

## 2024-03-25 RX ADMIN — POTASSIUM CHLORIDE 10 MEQ: 7.46 INJECTION, SOLUTION INTRAVENOUS at 04:23

## 2024-03-25 RX ADMIN — POTASSIUM CHLORIDE 10 MEQ: 7.46 INJECTION, SOLUTION INTRAVENOUS at 21:00

## 2024-03-25 RX ADMIN — SODIUM CHLORIDE 6 MG/HR: 9 INJECTION, SOLUTION INTRAVENOUS at 21:21

## 2024-03-25 RX ADMIN — THIAMINE HYDROCHLORIDE 500 MG: 100 INJECTION, SOLUTION INTRAMUSCULAR; INTRAVENOUS at 06:42

## 2024-03-25 RX ADMIN — ENOXAPARIN SODIUM 40 MG: 100 INJECTION SUBCUTANEOUS at 17:10

## 2024-03-25 RX ADMIN — DEXMEDETOMIDINE HYDROCHLORIDE 0.2 MCG/KG/HR: 100 INJECTION, SOLUTION INTRAVENOUS at 21:15

## 2024-03-25 RX ADMIN — SODIUM CHLORIDE 5 MG/HR: 9 INJECTION, SOLUTION INTRAVENOUS at 10:32

## 2024-03-25 RX ADMIN — MIDAZOLAM HYDROCHLORIDE 0.5 MG: 1 INJECTION, SOLUTION INTRAMUSCULAR; INTRAVENOUS at 11:53

## 2024-03-25 RX ADMIN — SODIUM CHLORIDE, POTASSIUM CHLORIDE, SODIUM LACTATE AND CALCIUM CHLORIDE: 600; 310; 30; 20 INJECTION, SOLUTION INTRAVENOUS at 06:44

## 2024-03-25 ASSESSMENT — PAIN DESCRIPTION - PAIN TYPE
TYPE: ACUTE PAIN

## 2024-03-25 NOTE — ASSESSMENT & PLAN NOTE
CT head w/o contrast revealing 2mm focus hyperdensity in left frontal white matter; vascular neurology evaluated patient in ED and think this is an incidental finding and not likely intraparenchymal hemorrhage  MRI pending

## 2024-03-25 NOTE — ASSESSMENT & PLAN NOTE
History of HTN, not on home meds per chart review  UDS positive for meth, possible etiology  On nicardipine drip, slowly decrease blood pressure, target SBP around 160-170

## 2024-03-25 NOTE — ASSESSMENT & PLAN NOTE
Mild hypoxia in part related to hypertensive crisis  RT protocols  I-S when able  Mobilize when able  Follow-up chest x-ray as needed  Weaned to room air, WOB low

## 2024-03-25 NOTE — PROGRESS NOTES
Critical Care Progress Note    Date of admission  3/24/2024    Chief Complaint  63F ho HTN found down, possibly by neighbor, reportedly in feces.  Unknown LKN.  Lac to ear.  SBP  260/150 with EMS, given metoprolol 5.  226/143 here.  Got hydralazine now cardene.  CTH with bleed vs calcification, neuro unconcerned about bleed but recommending MRI brain to eval possibility of cavernous malformation.  (Dr Erickson HPI 3/24)     Hospital Course  No notes on file    Interval Problem Update  Reviewed last 24 hour events:    A&O x1, answers no, poor speech, thumbs up  Lethargic  Denies pain  UDS positive for amphetamines  Dexmedetomidine 0.1, RASS neg-1  SB/SR 50-80s  -230  LR 75  Lactic acid 2.3 improved to 1.6-1.8  Troponin mildly elevated at 63 up from 51  Echo: Mild LVH, severe reduced LV systolic function, EF 25-30% with global hypokinesis and regional variation, RV normal size and mild reduced function, mild MR, grade 2 diastolic dysfunction  Nicardipine infusion actively titrated, 5  UO adequate, fluid balance +745 cc/admit  1 L nasal cannula  WOB low  O2 sats 99%  3/24 CXR with bibasilar atelectasis/opacities  WBC 10.0  Tmax 97.8  Viral PCR panel negative for influenza, RSV and COVID  Hemoglobin 16.5,   BUN 21, creatinine 1.42-bolus improved  Mg 2.1, Phos 2.4, K 3.6, AG 14, HCO3 23      Slowly bring blood pressure goal down over several days  Optimize electrolytes  KUB pending, part of the MRI protocol  MRI brain pending  IS  Neuro checks  F/u Trop    Review of Systems  Review of Systems   Unable to perform ROS: Acuity of condition        Vital Signs for last 24 hours   Temp:  [36 °C (96.8 °F)-36.5 °C (97.7 °F)] 36 °C (96.8 °F)  Pulse:  [46-87] 46  Resp:  [11-50] 20  BP: (131-237)/() 158/76  SpO2:  [88 %-100 %] 96 %    Hemodynamic parameters for last 24 hours       Respiratory Information for the last 24 hours       Physical Exam   Physical Exam  Constitutional:       General: He is not in acute  distress.     Appearance: He is overweight. He is not ill-appearing or diaphoretic.      Interventions: He is sedated. Nasal cannula in place.   HENT:      Head: Normocephalic and atraumatic.   Cardiovascular:      Rate and Rhythm: Normal rate and regular rhythm.      Pulses: Normal pulses.      Heart sounds: No murmur heard.     No gallop.   Pulmonary:      Effort: Pulmonary effort is normal. No respiratory distress.      Breath sounds: No wheezing, rhonchi or rales.   Abdominal:      General: There is no distension.      Tenderness: There is no abdominal tenderness. There is no guarding.   Musculoskeletal:      Cervical back: Neck supple. No rigidity.      Right lower leg: No edema.      Left lower leg: No edema.   Lymphadenopathy:      Cervical: No cervical adenopathy.   Skin:     General: Skin is warm and dry.      Capillary Refill: Capillary refill takes 2 to 3 seconds.   Neurological:      Mental Status: He is lethargic.         Medications  Current Facility-Administered Medications   Medication Dose Route Frequency Provider Last Rate Last Admin    hydrALAZINE (Apresoline) injection 10 mg  10 mg Intravenous Q4HRS PRN Kiah Gordon M.D.        niCARdipine (Cardene) 25 mg in  mL Standard Infusion  0-15 mg/hr Intravenous Continuous India Acosta M.D. 25 mL/hr at 03/25/24 1135 2.5 mg/hr at 03/25/24 1135    lactated ringers infusion   Intravenous Continuous India Acosta M.D. 75 mL/hr at 03/25/24 1148 Rate Verify at 03/25/24 1148    enoxaparin (Lovenox) inj 40 mg  40 mg Subcutaneous DAILY AT 1800 India Acosta M.D.        ondansetron (Zofran) syringe/vial injection 4 mg  4 mg Intravenous Q4HRS PRN India Acosta M.D.        ondansetron (Zofran ODT) dispertab 4 mg  4 mg Oral Q4HRS PRN India Acosta M.D.        thiamine (B-1) 500 mg in dextrose 5% 100 mL IVPB  500 mg Intravenous DAILY India Acosta M.D. 200 mL/hr at 03/25/24 0642 500 mg at 03/25/24 0642    dexmedetomidine (PRECEDEX) 400 mcg/100mL NS  premix infusion  0.1-1.5 mcg/kg/hr (Ideal) Intravenous Continuous India Acosta M.D. 23.7 mL/hr at 03/25/24 1230 1.3 mcg/kg/hr at 03/25/24 1230       Fluids    Intake/Output Summary (Last 24 hours) at 3/25/2024 1300  Last data filed at 3/25/2024 1230  Gross per 24 hour   Intake 4138.17 ml   Output 2250 ml   Net 1888.17 ml       Laboratory      Recent Labs     03/24/24  1434   CPKTOTAL 303*     Recent Labs     03/24/24  1434 03/24/24  1700 03/24/24  2345 03/25/24  0818   SODIUM  --  143 143 141   POTASSIUM  --  3.0* 3.6 4.1   CHLORIDE  --  105 106 106   CO2  --  22 23 21   BUN  --  24* 21 23*   CREATININE  --  1.45* 1.42* 1.60*   MAGNESIUM 2.1  --   --   --    PHOSPHORUS 2.4*  --   --   --    CALCIUM  --  8.7 8.0* 8.4*     Recent Labs     03/24/24  1045 03/24/24  1700 03/24/24  2345 03/25/24  0818   ALTSGPT 26  --   --   --    ASTSGOT 29  --   --   --    ALKPHOSPHAT 125*  --   --   --    TBILIRUBIN 1.5  --   --   --    GLUCOSE 125* 95 99 89     Recent Labs     03/24/24  1045 03/25/24  0615   WBC 10.0 10.9*   NEUTSPOLYS 86.70*  --    LYMPHOCYTES 6.10*  --    MONOCYTES 6.20  --    EOSINOPHILS 0.20  --    BASOPHILS 0.40  --    ASTSGOT 29  --    ALTSGPT 26  --    ALKPHOSPHAT 125*  --    TBILIRUBIN 1.5  --      Recent Labs     03/24/24  1045 03/25/24  0615   RBC 5.59 4.85   HEMOGLOBIN 16.5 14.4   HEMATOCRIT 49.8 44.0   PLATELETCT 281 182   PROTHROMBTM 15.5*  --    APTT 27.5  --    INR 1.22*  --        Imaging  X-Ray:  I have personally reviewed the images and compared with prior images.  EKG:  I have personally reviewed the images and compared with prior images.  CT:    Reviewed  Echo:   Reviewed    Assessment/Plan  * Hypertensive crisis- (present on admission)  Assessment & Plan  Systemic blood pressure in the 250 range initially  Likely encephalopathy, FRANKLYN, pulmonary edema and troponin leak from hypertension although rule out other etiologies  Actively titrating nicardipine, initially BP goal range 165-185  After 24  hours will slowly drop blood pressure goal as clinically appropriate monitoring for signs of hypoperfusion    Abnormal imaging of central nervous system  Assessment & Plan  CT head revealed a 1.2 mm left frontal hyperdensity  Neurology recommending MRI, pending  KUB screen for MRI protocol    FRANKLYN (acute kidney injury) ([facility])  Assessment & Plan  Secondary to hypertensive crisis  Query CKD  Avoid nephrotoxins  Serial BMP  Monitor UO with Brito  Renal ultrasound/nephrology consultation as needed clinically    Acute metabolic encephalopathy  Assessment & Plan  Likely hypertensive in etiology  CT head with very small left frontal hyperdensity  Neurology recommending MRI  Further evaluation as clinically appropriate  Neurochecks    Elevated troponin  Assessment & Plan  Suspect demand ischemia with hypertensive crisis  Serial troponin level out of completeness  EKG not consistent with STEMI  Obtain echocardiogram  Cardiology consultation as needed    HFrEF (heart failure with reduced ejection fraction) ([facility])  Assessment & Plan  Controlling blood pressure with nicardipine infusion as well as as needed hydralazine  Forced diuresis as needed with furosemide    Prolonged Q-T interval on ECG  Assessment & Plan  QTc 503  Optimize electrolytes  Avoid QT prolonging agents as able  Cardiac monitoring    Acute hypoxic respiratory failure ([facility])  Assessment & Plan  Mild hypoxia in part related to hypertensive crisis  RT protocols  I-S when able  Mobilize when able  Follow-up chest x-ray as needed    Essential hypertension- (present on admission)  Assessment & Plan  Resume home regimen as clinically appropriate  Query compliance, further conversations with patient when he is no longer encephalopathic    Positive urine drug screen- (present on admission)  Assessment & Plan  UDS positive for amphetamines, this problem dates back many years with prior positive urine drug screens    Hypophosphatemia  Assessment &  Plan  Replete    Hypokalemia  Assessment & Plan  Replete         VTE:  Lovenox  Ulcer: Not Indicated  Lines: Brito Catheter  Ongoing indication addressed    I have performed a physical exam and reviewed and updated ROS and Plan today (3/25/2024). In review of yesterday's note (3/24/2024), there are no changes except as documented above.     Discussed patient condition and risk of morbidity and/or mortality with RN, RT, Pharmacy, [facility] resident, Charge nurse / hot rounds, and Patient  The patient remains critically ill.  Critical care time = 60 minutes in directly providing and coordinating critical care and extensive data review.  No time overlap and excludes procedures.

## 2024-03-25 NOTE — ASSESSMENT & PLAN NOTE
UDS positive for amphetamines, this problem dates back many years with prior positive urine drug screens  Cessation counseling when clinically appropriate

## 2024-03-25 NOTE — PROGRESS NOTES
4 Eyes Skin Assessment Completed by RIGOBERTO Ghotra and RIGOBERTO Piña.     Head Redness  Ears Redness Laceration sutured on rt ear intact  Nose Redness  Mouth Dryness  Neck Redness  Breast/Chest Redness  Shoulder Blades Redness  Spine Redness  (R) Arm/Elbow/Hand Redness, Bruising, Abrasion, and Scab RT AC bruising and redness  (L) Arm/Elbow/Hand Redness, Bruising, Abrasion, and Scab  Abdomen Redness and Rash  Groin Redness and Rash  Scrotum/Coccyx/Buttocks Redness, Non-Blanching, and Discoloration possible DTI  (R) Leg Redness, Non-Blanching, and Abrasion  (L) Leg Redness, Non-Blanching, and Abrasion  (R) Heel/Foot/Toe Redness and Non-Blanching  (L) Heel/Foot/Toe Bruising redness

## 2024-03-25 NOTE — PROGRESS NOTES
4 Eyes Skin Assessment Completed by RIGOBERTO Delong and RIGOBERTO Blake.    Head WDL  Ears Redness R ear sutures s/p laceration  Nose WDL  Mouth WDL  Neck WDL  Breast/Chest Redness rash across chest  Shoulder Blades WDL  Spine WDL old scar from spine surgery  (R) Arm/Elbow/Hand Bruising, Abrasion, and Scab  (L) Arm/Elbow/Hand Bruising, Abrasion, and Scab  Abdomen WDL  Groin Redness and Excoriation red rash on leg and pannus/ groin area  Scrotum/Coccyx/Buttocks Redness and Non-Blanching known DTI  (R) Leg Redness and Blanching discoloration on legs  (L) Leg Redness and Blanching discoloration on legs scars from skin graft and old wond  (R) Heel/Foot/Toe Redness, Blanching, and Bruising  (L) Heel/Foot/Toe Redness, Blanching, and Bruising          Devices In Places ECG, Blood Pressure Cuff, Pulse Ox, and Nasal Cannula      Interventions In Place Gray Ear Foams, Sacral Mepilex, Heel Float Boots, TAP System, Pillows, Q2 Turns, Low Air Loss Mattress, and Pressure Redistribution Mattress    Possible Skin Injury No    Pictures Uploaded Into Epic Yes  Wound Consult Placed yes  RN Wound Prevention Protocol Ordered

## 2024-03-25 NOTE — ASSESSMENT & PLAN NOTE
Possibly related to underlying demand ischemia with decreased renal clearance   Troponin: 51 --> 55 --> 63  EKG reviewed, no acute ST changes noted however possible underlying repolarization abnormalities  On telemetry  Continue trending

## 2024-03-25 NOTE — ASSESSMENT & PLAN NOTE
Unclear baseline, possibly 1.0-1.1 however last stable Cr from 2022  UA relatively bland, protein loss noted  Limit nephrotoxins and renally dose as appropriate  Possibly prerenal, continue LR at 75cc/hr

## 2024-03-25 NOTE — ASSESSMENT & PLAN NOTE
Unclear if the patient is on oxygen at home, no known history of COPD  Bibasilar opacities L > R, empirically started on Unasyn in the ED but later discontinued as etiology preferred to be pulmonary edema, overall decreasing O2 requirements  Currently requiring supplemental O2, titrate to >90%

## 2024-03-25 NOTE — ASSESSMENT & PLAN NOTE
Controlling blood pressure with nicardipine infusion as well as as needed hydralazine  Forced diuresis as needed with furosemide  EF 25-30% by echo  Query cardiology consultation prior to discharge, would prefer neurological improvement so he can participate in the consultation process

## 2024-03-25 NOTE — ASSESSMENT & PLAN NOTE
QTc 503  Continue to optimize electrolytes  Avoid QT prolonging agents as able  Cardiac monitoring

## 2024-03-25 NOTE — PROGRESS NOTES
MRI pump set up with Nicardipine @ 5mg/hr and Precedex 1.1mcg/kg/hr.   Double verified with Carolyn FRANKLIN

## 2024-03-25 NOTE — PROGRESS NOTES
SPOKE WITH DR. HANSON, HAD HIM LOOK OVER HEAD, CHEST AND ABDOMEN/PELVIS IMAGING FOR CLEARANCE. DR. HANSON STATED HE DID NOT ANY IMPLANTS IN HEAD NOR CHEST, PATIENT DOES HAVE LUMBAR HARDWARE. OKAY TO PROCEED WITH MRI BRAIN MRI SCAN WITHOUT-HILTON 03/25/2024 @ 2875

## 2024-03-25 NOTE — ASSESSMENT & PLAN NOTE
Systemic blood pressure in the 250 range initially  Likely encephalopathy, FRANKLYN, pulmonary edema and troponin leak from hypertension although rule out other etiologies  Actively titrating nicardipine, slowly bringing down BP goal, will use -160 range  Continue to monitor for signs of hypoperfusion as we come down on BP goal  Starting enteral amlodipine  Adding as needed IV labetalol

## 2024-03-25 NOTE — CARE PLAN
The patient is Watcher - Medium risk of patient condition declining or worsening    Shift Goals  Clinical Goals: Neuro status, hemodynamic stability  Patient Goals: POLINA  Family Goals: POLINA    Progress made toward(s) clinical / shift goals:    Problem: Safety - Medical Restraint  Goal: Remains free of injury from restraints (Restraint for Interference with Medical Device)  Outcome: Progressing  Note: Patient remains free from injury     Problem: Skin Integrity  Goal: Skin integrity is maintained or improved  Outcome: Progressing     Problem: Fall Risk  Goal: Patient will remain free from falls  Outcome: Progressing       Patient is not progressing towards the following goals:

## 2024-03-25 NOTE — ASSESSMENT & PLAN NOTE
Unclear etiology, possibly multifactorial in setting of meth use, Wernicke's (history of alcohol use, unclear if current), PRES, acute hypoxic respiratory failure  CT head revealed 2mm focus hyperdensity (vascular neurology evaluated patient in ED and think finding is incidental rather than intraparenchymal hemorrhage)  MRI brain is pending to better evaluate possible cavernous malformation versus calcification and extensive amount of white matter disease   Blood cultures NGTD  Started on empiric IV thiamine

## 2024-03-25 NOTE — PROGRESS NOTES
"[facility] Progress Note      Admit Date: 3/24/2024    Resident(s): Usman Scott M.D.   Attending:  SHANNA WORKMAN/ Dr. Carrera    Patient ID:    Name:  Andrews Sanchez   YOB: 1960  Age:  63 y.o.  male   MRN:  [id number]    Hospital Course (carried forward and updated):  Per Dr. Mejia, \"Andrews Sanchez is a 63 y.o. male with a past medical history of hypertension and previous admissions for hypertensive crisis and wound care. Patient is unable to provide history; social history, medical history, and family history are obtained from chart review.   Patient was found down for an unknown amount of time covered in feces. Patient was brought in by ambulance, with EMS reporting blood pressures of 260/150. He was administered 5mg IV metoprolol.   Blood pressure was read at 226/143 upon arrival to [facility]. Patient is AxO x 1; malodorous, disheveled with bilateral scleral injection. Patient had chest xray revealing concern for infection vs atelectasis and was treated empirically with unasyn and azithromycin. He underwent CT head, notable for 2mm focus hyperdensity; vascular neurology consulted in ED, think it is an incidental finding and not likely an intraparenchymal hemorrhage.\"    Interval Events:  3/25: Patient unable to follow basic commands but easily arouseable. Patient is able to protect his airway.  Labs were significant for WBC of 10.9, BUN of 23, Cr of 1.6, calcium of 8.4.  MRI is pending.     Consultants:  Critical Care  Neurology     Vitals Range last 24h:  Temp:  [36 °C (96.8 °F)-36.6 °C (97.8 °F)] 36 °C (96.8 °F)  Pulse:  [46-87] 50  Resp:  [11-27] 20  BP: (131-251)/() 200/100  SpO2:  [83 %-100 %] 99 %      Intake/Output Summary (Last 24 hours) at 3/25/2024 1030  Last data filed at 3/25/2024 0846  Gross per 24 hour   Intake 3905.25 ml   Output 1950 ml   Net 1955.25 ml        Review of Systems   Unable to perform ROS: Mental status change       PHYSICAL EXAM:  Vitals:    03/25/24 0700 " 03/25/24 0730 03/25/24 0800 03/25/24 0830   BP: (!) 170/118 (!) 169/102 (!) 178/115 (!) 200/100   Pulse: (!) 46 (!) 49 70 (!) 50   Resp: (!) 11 (!) 25 18 20   Temp:   36 °C (96.8 °F)    TempSrc:   Temporal    SpO2: 99% 98% 98% 99%   Weight:       Height:        Body mass index is 30.75 kg/m².    O2 therapy: Pulse Oximetry: 99 %, O2 (LPM): 1, O2 Delivery Device: Nasal Cannula    Date 03/25/24 0700 - 03/26/24 0659   Shift 1291-3319 7489-0852 5903-5750 24 Hour Total   INTAKE   I.V. 677.6   677.6     Precedex Volume 20.1   20.1     Volume (mL) (lactated ringers infusion) 657.5   657.5   IV Piggyback 533   533     Volume (mL) (potassium chloride (Kcl) ivpb 10 mEq) 207   207     Volume (mL) (potassium phosphate IVPB 30 mmol in 500 mL D5W (premix)) 37.5   37.5     Volume (mL) (potassium chloride (Kcl) ivpb 10 mEq) 195.1   195.1     Volume (mL) (ampicillin/sulbactam (Unasyn) 3 g in  mL IVPB) 93.4   93.4   Shift Total 1210.6   1210.6   OUTPUT   Shift Total       NET 1210.6   1210.6        Physical Exam  Vitals and nursing note reviewed.   Constitutional:       General: He is not in acute distress.     Appearance: He is obese.   HENT:      Head: Normocephalic and atraumatic.      Right Ear: External ear normal.      Left Ear: External ear normal.      Nose: No rhinorrhea.      Mouth/Throat:      Mouth: Mucous membranes are moist.   Eyes:      Extraocular Movements: Extraocular movements intact.      Conjunctiva/sclera: Conjunctivae normal.   Cardiovascular:      Rate and Rhythm: Normal rate and regular rhythm.      Heart sounds: No murmur heard.     No gallop.   Pulmonary:      Effort: No respiratory distress.      Breath sounds: Normal breath sounds. No stridor. No wheezing or rhonchi.   Abdominal:      General: Bowel sounds are normal. There is no distension.      Tenderness: There is no abdominal tenderness.   Musculoskeletal:         General: No swelling.      Cervical back: Normal range of motion and neck supple.       Right lower leg: No edema.      Left lower leg: No edema.   Skin:     General: Skin is warm and dry.      Capillary Refill: Capillary refill takes less than 2 seconds.      Coloration: Skin is not jaundiced.   Neurological:      Mental Status: He is alert. He is disoriented.             Recent Labs     03/24/24  1434 03/24/24  1700 03/24/24  2345 03/25/24  0818   SODIUM  --  143 143 141   POTASSIUM  --  3.0* 3.6 4.1   CHLORIDE  --  105 106 106   CO2  --  22 23 21   BUN  --  24* 21 23*   CREATININE  --  1.45* 1.42* 1.60*   MAGNESIUM 2.1  --   --   --    PHOSPHORUS 2.4*  --   --   --    CALCIUM  --  8.7 8.0* 8.4*     Recent Labs     03/24/24  1045 03/24/24  1700 03/24/24  2345 03/25/24  0818   ALTSGPT 26  --   --   --    ASTSGOT 29  --   --   --    ALKPHOSPHAT 125*  --   --   --    TBILIRUBIN 1.5  --   --   --    GLUCOSE 125* 95 99 89     Recent Labs     03/24/24  1045 03/25/24  0615   RBC 5.59 4.85   HEMOGLOBIN 16.5 14.4   HEMATOCRIT 49.8 44.0   PLATELETCT 281 182   PROTHROMBTM 15.5*  --    APTT 27.5  --    INR 1.22*  --      Recent Labs     03/24/24  1045 03/25/24  0615   WBC 10.0 10.9*   NEUTSPOLYS 86.70*  --    LYMPHOCYTES 6.10*  --    MONOCYTES 6.20  --    EOSINOPHILS 0.20  --    BASOPHILS 0.40  --    ASTSGOT 29  --    ALTSGPT 26  --    ALKPHOSPHAT 125*  --    TBILIRUBIN 1.5  --        Meds:   hydrALAZINE  10 mg      niCARdipine (Cardene) 25 mg in  mL Standard Infusion  0-15 mg/hr 5 mg/hr (03/24/24 1700)    LR   75 mL/hr at 03/25/24 0846    enoxaparin (LOVENOX) injection  40 mg      ondansetron  4 mg      ondansetron  4 mg      thiamine  500 mg 500 mg (03/25/24 0642)    dexmedetomidine (Precedex) infusion  0.1-1.5 mcg/kg/hr (Ideal) 0.7 mcg/kg/hr (03/25/24 1003)        Procedures:  N/a    Imaging:  [id number] [id number]   Final Result      1.  No evidence of bowel obstruction.   2.  Lumbar fusion hardware and metallic clips. No other metallic foreign body seen.   3.  Severe degenerative changes bilateral  hip joints with likely AVN of the bilateral femoral heads.      EC-ECHOCARDIOGRAM COMPLETE W/O CONT   Final Result      CT-HEAD W/O   Final Result   Addendum (preliminary) 1 of 1   VOALTE message of critical findings sent to Dr. Huizar at 3/24/2024 11:37    AM. I also received confirmation of receipt of VOALTE message back from    the provider.      Final      1.  2 mm focus of hyperdensity in the left frontal white matter. This could be a small hemorrhage versus other etiology as above.   2.  Advanced confluent nonspecific white matter hypoattenuation which is markedly abnormal. Recommend follow-up with brain MRI study.      Based solely on CT findings, the brain injury guideline category is mBIG 1.      SDH < 4mm   IPH < 4mm   SAH < 3 sulci and < 1mm      The original BIG retrospective analysis found radiographic progression in 0% of BIG 1 patients and 2.6% BIG 2.      Efforts are underway to contact referring physician with results at time of dictation.      DX-CHEST-PORTABLE (1 VIEW)   Final Result         Hazy bibasilar opacities, left more than right, atelectasis or infection.      MR-BRAIN-W/O    (Results Pending)       ASSESSEMENT and PLAN:    * Hypertensive crisis- (present on admission)  Assessment & Plan  History of HTN, not on home meds per chart review  UDS positive for meth, possible etiology  On nicardipine drip, slowly decrease blood pressure, target SBP around 160-170    Acute metabolic encephalopathy  Assessment & Plan  Unclear etiology, possibly multifactorial in setting of meth use, Wernicke's (history of alcohol use, unclear if current), PRES, acute hypoxic respiratory failure  CT head revealed 2mm focus hyperdensity (vascular neurology evaluated patient in ED and think finding is incidental rather than intraparenchymal hemorrhage)  MRI brain is pending to better evaluate possible cavernous malformation versus calcification and extensive amount of white matter disease   Blood cultures  NGTD  Started on empiric IV thiamine     Acute hypoxic respiratory failure (HCC)  Assessment & Plan  Unclear if the patient is on oxygen at home, no known history of COPD  Bibasilar opacities L > R, empirically started on Unasyn in the ED but later discontinued as etiology preferred to be pulmonary edema, overall decreasing O2 requirements  Currently requiring supplemental O2, titrate to >90%    Elevated troponin  Assessment & Plan  Possibly related to underlying demand ischemia with decreased renal clearance   Troponin: 51 --> 55 --> 63  EKG reviewed, no acute ST changes noted however possible underlying repolarization abnormalities  On telemetry  Continue trending    FRANKLYN (acute kidney injury) ([facility])  Assessment & Plan  Unclear baseline, possibly 1.0-1.1 however last stable Cr from 2022  UA relatively bland, protein loss noted  Limit nephrotoxins and renally dose as appropriate  Possibly prerenal, continue LR at 75cc/hr    HFrEF (heart failure with reduced ejection fraction) ([facility])  Assessment & Plan  No known history of heart failure, possibly meth induced  Echo (3/24): Per report, mild concentric LVH, EF estimated to be 25-30%   Likely initiation of GDMT once patient  is more stable    Abnormal imaging of central nervous system  Assessment & Plan  CT head w/o contrast revealing 2mm focus hyperdensity in left frontal white matter; vascular neurology evaluated patient in ED and think this is an incidental finding and not likely intraparenchymal hemorrhage  MRI pending    Hypophosphatemia  Assessment & Plan  On tele  Continue to monitor and replete as indicated    Hypokalemia  Assessment & Plan  On tele  Continue to monitor and replete as indicated      DISPO: Continue ICU stay    CODE STATUS: Full    Quality Measures:  Feeding: NPO  Analgesia: N/a  Sedation: N/a  Thromboprophylaxis: Lovenox  Head of bed: >30 degrees  Ulcer prophylaxis: N/a  Glycemic control: Correctional: N/a / Basal: N/a  Bowel care: bowel  regimen  Indwelling lines: PIV  Deescalation of antibiotics: D/c Stephenie Scott M.D.

## 2024-03-25 NOTE — ASSESSMENT & PLAN NOTE
Resume home regimen as clinically appropriate?  Unclear about compliance  Start amlodipine 5 may double dose today  Adding labetalol as needed  Continue nicardipine infusion as needed  Continue hydralazine as needed  Continue to slowly reduce BP goal now at 160 systolic

## 2024-03-25 NOTE — ASSESSMENT & PLAN NOTE
Suspect demand ischemia with hypertensive crisis  Serial troponin level out of completeness  EKG not consistent with STEMI  Echo: EF 25-30% with global hypokinesis and regional variation  When neurologically improved consider cardiac consultation prior to discharge, risk stratification?

## 2024-03-25 NOTE — PROGRESS NOTES
Unable to complete med rec at this time. Daughter's number is wrong number in chart. Per Previous Med Rec Tech=[facility] has not filled for Pt in 2 years. Pt is unable to participate in interview.

## 2024-03-25 NOTE — ASSESSMENT & PLAN NOTE
Likely hypertensive in etiology  1 of 2 BC now + for GNR, alternative etiology, on antibiotics awaiting ID and sensitivities, source?  CT head with very small left frontal hyperdensity  MRI completed:  old lacunar infarcts, microhemorrhages, diffuse confluent FLAIR hyperintense signal abnormality-awaiting neurology follow-up  Further evaluation as clinically appropriate  Neurochecks unchanged, okay to reduce to every 2-4

## 2024-03-25 NOTE — ASSESSMENT & PLAN NOTE
CT head revealed a 1.2 mm left frontal hyperdensity  Neurology recommending MRI, complex interpretation reviewed, awaiting neurology follow-up  Microhemorrhages, old lacunar infarcts and diffuse confluent FLAIR hyperintense signal abnormality-toxic metabolic, very atypical for PRES, hypertension related?  Suspect longstanding poorly controlled hypertension and amphetamine abuse with multiple prior episodes of positive drug screens likely contributing to findings above as above?

## 2024-03-25 NOTE — ASSESSMENT & PLAN NOTE
Secondary to hypertensive crisis as well as chronically poorly controlled hypertension?  Query CKD with unchanging mild elevation in creatinine, appears euvolemic now  Avoid nephrotoxins  Serial BMP  Monitor UO with Brito  Renal ultrasound/nephrology consultation as needed clinically

## 2024-03-25 NOTE — ASSESSMENT & PLAN NOTE
No known history of heart failure, possibly meth induced  Echo (3/24): Per report, mild concentric LVH, EF estimated to be 25-30%   Likely initiation of GDMT once patient  is more stable

## 2024-03-26 PROBLEM — R78.81 BACTEREMIA DUE TO GRAM-NEGATIVE BACTERIA: Status: ACTIVE | Noted: 2024-03-26

## 2024-03-26 PROBLEM — R53.81 DEBILITY: Status: ACTIVE | Noted: 2024-01-01

## 2024-03-26 LAB
ANION GAP SERPL CALC-SCNC: 11 MMOL/L (ref 7–16)
ANION GAP SERPL CALC-SCNC: 13 MMOL/L (ref 7–16)
BACTERIA UR CULT: NORMAL
BUN SERPL-MCNC: 18 MG/DL (ref 8–22)
BUN SERPL-MCNC: 20 MG/DL (ref 8–22)
CALCIUM SERPL-MCNC: 8.3 MG/DL (ref 8.5–10.5)
CALCIUM SERPL-MCNC: 8.5 MG/DL (ref 8.5–10.5)
CHLORIDE SERPL-SCNC: 110 MMOL/L (ref 96–112)
CHLORIDE SERPL-SCNC: 110 MMOL/L (ref 96–112)
CO2 SERPL-SCNC: 22 MMOL/L (ref 20–33)
CO2 SERPL-SCNC: 23 MMOL/L (ref 20–33)
CREAT SERPL-MCNC: 1.43 MG/DL (ref 0.5–1.4)
CREAT SERPL-MCNC: 1.47 MG/DL (ref 0.5–1.4)
ERYTHROCYTE [DISTWIDTH] IN BLOOD BY AUTOMATED COUNT: 50.6 FL (ref 35.9–50)
GFR SERPLBLD CREATININE-BSD FMLA CKD-EPI: 52 ML/MIN/1.73 M 2
GFR SERPLBLD CREATININE-BSD FMLA CKD-EPI: 54 ML/MIN/1.73 M 2
GLUCOSE BLD STRIP.AUTO-MCNC: 128 MG/DL (ref 65–99)
GLUCOSE BLD STRIP.AUTO-MCNC: 144 MG/DL (ref 65–99)
GLUCOSE BLD STRIP.AUTO-MCNC: 61 MG/DL (ref 65–99)
GLUCOSE BLD STRIP.AUTO-MCNC: 75 MG/DL (ref 65–99)
GLUCOSE SERPL-MCNC: 75 MG/DL (ref 65–99)
GLUCOSE SERPL-MCNC: 77 MG/DL (ref 65–99)
HCT VFR BLD AUTO: 42.9 % (ref 42–52)
HGB BLD-MCNC: 14 G/DL (ref 14–18)
MCH RBC QN AUTO: 29.8 PG (ref 27–33)
MCHC RBC AUTO-ENTMCNC: 32.6 G/DL (ref 32.3–36.5)
MCV RBC AUTO: 91.3 FL (ref 81.4–97.8)
PLATELET # BLD AUTO: 218 K/UL (ref 164–446)
PMV BLD AUTO: 11 FL (ref 9–12.9)
POTASSIUM SERPL-SCNC: 3.4 MMOL/L (ref 3.6–5.5)
POTASSIUM SERPL-SCNC: 3.8 MMOL/L (ref 3.6–5.5)
RBC # BLD AUTO: 4.7 M/UL (ref 4.7–6.1)
SIGNIFICANT IND 70042: NORMAL
SITE SITE: NORMAL
SODIUM SERPL-SCNC: 144 MMOL/L (ref 135–145)
SODIUM SERPL-SCNC: 145 MMOL/L (ref 135–145)
SOURCE SOURCE: NORMAL
WBC # BLD AUTO: 9.9 K/UL (ref 4.8–10.8)

## 2024-03-26 PROCEDURE — 700105 HCHG RX REV CODE 258: Performed by: INTERNAL MEDICINE

## 2024-03-26 PROCEDURE — 99222 1ST HOSP IP/OBS MODERATE 55: CPT | Performed by: PSYCHIATRY & NEUROLOGY

## 2024-03-26 PROCEDURE — 99291 CRITICAL CARE FIRST HOUR: CPT | Performed by: INTERNAL MEDICINE

## 2024-03-26 PROCEDURE — 82962 GLUCOSE BLOOD TEST: CPT | Mod: 91

## 2024-03-26 PROCEDURE — 700111 HCHG RX REV CODE 636 W/ 250 OVERRIDE (IP)

## 2024-03-26 PROCEDURE — 700105 HCHG RX REV CODE 258

## 2024-03-26 PROCEDURE — 80048 BASIC METABOLIC PNL TOTAL CA: CPT

## 2024-03-26 PROCEDURE — 770000 HCHG ROOM/CARE - INTERMEDIATE ICU *

## 2024-03-26 PROCEDURE — 700101 HCHG RX REV CODE 250: Performed by: INTERNAL MEDICINE

## 2024-03-26 PROCEDURE — 700101 HCHG RX REV CODE 250

## 2024-03-26 PROCEDURE — 85027 COMPLETE CBC AUTOMATED: CPT

## 2024-03-26 PROCEDURE — 700102 HCHG RX REV CODE 250 W/ 637 OVERRIDE(OP): Performed by: INTERNAL MEDICINE

## 2024-03-26 PROCEDURE — A9270 NON-COVERED ITEM OR SERVICE: HCPCS | Performed by: INTERNAL MEDICINE

## 2024-03-26 PROCEDURE — 700111 HCHG RX REV CODE 636 W/ 250 OVERRIDE (IP): Performed by: INTERNAL MEDICINE

## 2024-03-26 RX ORDER — SODIUM CHLORIDE, SODIUM LACTATE, POTASSIUM CHLORIDE, CALCIUM CHLORIDE 600; 310; 30; 20 MG/100ML; MG/100ML; MG/100ML; MG/100ML
INJECTION, SOLUTION INTRAVENOUS CONTINUOUS
Status: DISCONTINUED | OUTPATIENT
Start: 2024-03-26 | End: 2024-03-26

## 2024-03-26 RX ORDER — AMLODIPINE BESYLATE 10 MG/1
5 TABLET ORAL
Status: DISCONTINUED | OUTPATIENT
Start: 2024-03-26 | End: 2024-03-26

## 2024-03-26 RX ORDER — HYDRALAZINE HYDROCHLORIDE 20 MG/ML
10 INJECTION INTRAMUSCULAR; INTRAVENOUS EVERY 4 HOURS PRN
Status: DISCONTINUED | OUTPATIENT
Start: 2024-03-26 | End: 2024-03-26

## 2024-03-26 RX ORDER — POTASSIUM CHLORIDE 7.45 MG/ML
10 INJECTION INTRAVENOUS
Status: COMPLETED | OUTPATIENT
Start: 2024-03-26 | End: 2024-03-26

## 2024-03-26 RX ORDER — AMLODIPINE BESYLATE 10 MG/1
10 TABLET ORAL
Status: DISCONTINUED | OUTPATIENT
Start: 2024-03-27 | End: 2024-03-27

## 2024-03-26 RX ORDER — HYDRALAZINE HYDROCHLORIDE 20 MG/ML
20 INJECTION INTRAMUSCULAR; INTRAVENOUS EVERY 4 HOURS PRN
Status: DISCONTINUED | OUTPATIENT
Start: 2024-03-26 | End: 2024-03-29

## 2024-03-26 RX ORDER — LABETALOL HYDROCHLORIDE 5 MG/ML
10 INJECTION, SOLUTION INTRAVENOUS EVERY 4 HOURS PRN
Status: DISCONTINUED | OUTPATIENT
Start: 2024-03-26 | End: 2024-03-29

## 2024-03-26 RX ORDER — AMLODIPINE BESYLATE 10 MG/1
5 TABLET ORAL ONCE
Status: COMPLETED | OUTPATIENT
Start: 2024-03-26 | End: 2024-03-26

## 2024-03-26 RX ADMIN — POTASSIUM CHLORIDE 10 MEQ: 7.46 INJECTION, SOLUTION INTRAVENOUS at 08:18

## 2024-03-26 RX ADMIN — CEFTRIAXONE SODIUM 2000 MG: 10 INJECTION, POWDER, FOR SOLUTION INTRAVENOUS at 00:47

## 2024-03-26 RX ADMIN — SODIUM CHLORIDE 12.5 MG/HR: 9 INJECTION, SOLUTION INTRAVENOUS at 23:07

## 2024-03-26 RX ADMIN — POTASSIUM CHLORIDE 10 MEQ: 7.46 INJECTION, SOLUTION INTRAVENOUS at 05:35

## 2024-03-26 RX ADMIN — AMLODIPINE BESYLATE 5 MG: 10 TABLET ORAL at 09:26

## 2024-03-26 RX ADMIN — SODIUM CHLORIDE, POTASSIUM CHLORIDE, SODIUM LACTATE AND CALCIUM CHLORIDE: 600; 310; 30; 20 INJECTION, SOLUTION INTRAVENOUS at 07:22

## 2024-03-26 RX ADMIN — SODIUM CHLORIDE 5 MG/HR: 9 INJECTION, SOLUTION INTRAVENOUS at 01:45

## 2024-03-26 RX ADMIN — SODIUM CHLORIDE 15 MG/HR: 9 INJECTION, SOLUTION INTRAVENOUS at 18:57

## 2024-03-26 RX ADMIN — DEXTROSE MONOHYDRATE 250 ML: 100 INJECTION, SOLUTION INTRAVENOUS at 05:20

## 2024-03-26 RX ADMIN — SODIUM CHLORIDE: 9 INJECTION, SOLUTION INTRAVENOUS at 00:48

## 2024-03-26 RX ADMIN — SODIUM CHLORIDE 15 MG/HR: 9 INJECTION, SOLUTION INTRAVENOUS at 20:50

## 2024-03-26 RX ADMIN — SODIUM CHLORIDE 15 MG/HR: 9 INJECTION, SOLUTION INTRAVENOUS at 15:06

## 2024-03-26 RX ADMIN — SODIUM CHLORIDE 15 MG/HR: 9 INJECTION, SOLUTION INTRAVENOUS at 17:05

## 2024-03-26 RX ADMIN — LABETALOL HYDROCHLORIDE 10 MG: 5 INJECTION INTRAVENOUS at 19:39

## 2024-03-26 RX ADMIN — POTASSIUM CHLORIDE 10 MEQ: 7.46 INJECTION, SOLUTION INTRAVENOUS at 06:40

## 2024-03-26 RX ADMIN — HYDRALAZINE HYDROCHLORIDE 10 MG: 20 INJECTION, SOLUTION INTRAMUSCULAR; INTRAVENOUS at 11:38

## 2024-03-26 RX ADMIN — THIAMINE HYDROCHLORIDE 500 MG: 100 INJECTION, SOLUTION INTRAMUSCULAR; INTRAVENOUS at 05:43

## 2024-03-26 RX ADMIN — DEXMEDETOMIDINE HYDROCHLORIDE 0.8 MCG/KG/HR: 100 INJECTION, SOLUTION INTRAVENOUS at 19:51

## 2024-03-26 RX ADMIN — AMLODIPINE BESYLATE 5 MG: 10 TABLET ORAL at 14:54

## 2024-03-26 RX ADMIN — HYDRALAZINE HYDROCHLORIDE 10 MG: 20 INJECTION, SOLUTION INTRAMUSCULAR; INTRAVENOUS at 17:06

## 2024-03-26 RX ADMIN — DEXMEDETOMIDINE HYDROCHLORIDE 1 MCG/KG/HR: 100 INJECTION, SOLUTION INTRAVENOUS at 02:56

## 2024-03-26 ASSESSMENT — PAIN DESCRIPTION - PAIN TYPE
TYPE: ACUTE PAIN

## 2024-03-26 ASSESSMENT — FIBROSIS 4 INDEX
FIB4 SCORE: 2.03
FIB4 SCORE: 1.7

## 2024-03-26 NOTE — ASSESSMENT & PLAN NOTE
3/31/2024   No known history of heart failure, possibly meth induced  Echo (3/24): Per report, mild concentric LVH, EF estimated to be 25-30%   Likely initiation of GDMT once patient  is more stable.  Cardiology has been following him.

## 2024-03-26 NOTE — CONSULTS
Hospital Medicine Consultation    Date of Service  3/26/2024    Referring Physician  Antoine Carrera M.D.    Consulting Physician  Jayda Johnson M.D.    Reason for Consultation  Transfer of care    History of Presenting Illness    63 y.o. male with past medical history of hypertension hypercholesterolemia who presented 3/24/2024 after he found down for an unknown amount of 9 and he was covered in feces.  Patient found to have significant elevated blood pressure and as per the chart review the blood pressure was 226/143 on upon arrival to [facility].  He was diagnosed with hypertensive crisis, hypoxic respiratory failure, acute encephalopathy and acute kidney injury.  On imaging studies patient found to have 1.2 mm left frontal hyperdensity.  He underwent MRI brain that showed diffuse confluent FLAIR.  He was placed on Precedex gtt. for ongoing agitation and also he was placed on nicardipine infusion for uncontrolled hypertension.    On March 26, 2024 intensivist Dr. Carrera requested to transfer care to hospitalist service.    I evaluated and examined him at the bedside.  Currently he is on 1 L of oxygen and maintaining oxygen saturation.  Blood pressure still running on the higher side current blood pressure is 195/110.  Current respiratory rate is 27 and pulse of 59.    CBC showed white blood cell count of 9.9, hemoglobin of 14.0 and platelet count of 218.    BMP showed sodium of 144, potassium 3.8, BUN of 18 and creatinine of 1.47.  Current calcium is 8.5    Blood culture came back positive with gram-negative rods.  Sensitivities in progress.  He was started on IV ceftriaxone.  I reviewed echocardiogram that showed mild concentric left ventricular hypertrophy.  Severely reduced left ventricular systolic function with EF of 25 to 30%.  Mildly reduced right ventricular systolic function.  Moderate mitral annular calcification.    Review of Systems  Review of Systems   Unable to perform ROS: Mental acuity        Past Medical History   has a past medical history of Hypercholesteremia, Hypertension, and Muscle disorder.    Surgical History   has a past surgical history that includes laminotomy (1990) and pr split grft,head,fac,hand,feet <100sqcm (Left, 9/14/2022).    Family History  family history includes Hypertension in his father; Lung Disease in his father; Other in his father and mother.    Social History   reports that he has been smoking cigarettes. He has a 3.2 pack-year smoking history. He has never used smokeless tobacco. He reports that he does not drink alcohol and does not use drugs.    Medications  None       Allergies  Allergies   Allergen Reactions    Bee Venom Swelling       Physical Exam  Temp:  [36.1 °C (97 °F)-36.8 °C (98.2 °F)] 36.3 °C (97.4 °F)  Pulse:  [37-88] 59  Resp:  [9-37] 27  BP: (133-216)/() 195/110  SpO2:  [89 %-99 %] 96 %    Physical Exam  Vitals reviewed.   Constitutional:       General: He is not in acute distress.     Appearance: He is ill-appearing.      Comments: Soft restraints   HENT:      Head: Normocephalic and atraumatic. No contusion.      Right Ear: External ear normal.      Left Ear: External ear normal.      Nose: Nose normal.      Mouth/Throat:      Pharynx: No oropharyngeal exudate.   Eyes:      General:         Right eye: No discharge.         Left eye: No discharge.      Pupils: Pupils are equal, round, and reactive to light.   Cardiovascular:      Rate and Rhythm: Normal rate and regular rhythm.      Heart sounds: No murmur heard.     No friction rub. No gallop.   Pulmonary:      Effort: Pulmonary effort is normal.      Breath sounds: No wheezing or rhonchi.   Abdominal:      General: Bowel sounds are normal. There is no distension.      Palpations: Abdomen is soft.      Tenderness: There is no abdominal tenderness. There is no rebound.   Musculoskeletal:         General: No swelling or tenderness. Normal range of motion.      Cervical back: No rigidity. No  muscular tenderness.   Skin:     General: Skin is warm and dry.      Coloration: Skin is not jaundiced.   Neurological:      General: No focal deficit present.      Mental Status: He is alert. He is disoriented.      Cranial Nerves: No cranial nerve deficit.      Sensory: No sensory deficit.      Comments: Following commands   Psychiatric:         Mood and Affect: Mood normal.         Fluids  Date 03/26/24 0700 - 03/27/24 0659   Shift 4425-1408 3459-7922 1766-5744 24 Hour Total   INTAKE   I.V. 304.5   304.5   IV Piggyback 200   200   Shift Total 504.5   504.5   OUTPUT   Shift Total       Weight (kg) 97.1 97.1 97.1 97.1       Laboratory  Recent Labs     03/24/24  1045 03/25/24  0615 03/26/24  0146   WBC 10.0 10.9* 9.9   RBC 5.59 4.85 4.70   HEMOGLOBIN 16.5 14.4 14.0   HEMATOCRIT 49.8 44.0 42.9   MCV 89.1 90.7 91.3   MCH 29.5 29.7 29.8   MCHC 33.1 32.7 32.6   RDW 49.4 50.0 50.6*   PLATELETCT 281 182 218   MPV 11.0 11.3 11.0     Recent Labs     03/25/24  1648 03/26/24  0409 03/26/24  0950   SODIUM 143 145 144   POTASSIUM 3.6 3.4* 3.8   CHLORIDE 107 110 110   CO2 22 22 23   GLUCOSE 72 75 77   BUN 22 20 18   CREATININE 1.52* 1.43* 1.47*   CALCIUM 8.3* 8.3* 8.5     Recent Labs     03/24/24  1045   APTT 27.5   INR 1.22*                 Imaging  MR-BRAIN-W/O   Final Result      1.  Diffuse confluent FLAIR hyperintense signal abnormality throughout the supratentorial white matter including posterior limbs internal capsules, subinsular white matter, as well as thalamic gray matter. Findings would be atypical and extreme for    chronic microvascular ischemic change. Toxic or metabolic leukoencephalopathy or consequence of other diffuse white matter insult including sequela of narcotic or other substance abuse might be considered. The extremely diffuse involvement would also be    very atypical for posterior reversible encephalopathy syndrome (PRES).   2.  There is also diffuse FLAIR signal involving the brainstem, middle  cerebellar peduncles, and cerebellar white matter, presumably the same etiology as the supratentorial process.   3.  Innumerable supratentorial and posterior fossa foci of microhemorrhage compatible with the given history of hypertension. One of these foci probably corresponds to the punctate focus of acute hemorrhagic density in the left frontal deep white matter    seen on the CT scan. This focus shows no significant surrounding vasogenic edema or mass effect.   4.  11 mm ovoid focus of FLAIR hyperintensity in the right cerebellar deep white matter likely representing encephalomalacia related to old infarct or other remote insult.   5.  Subcentimeter foci x3 of bright diffusion signal involving the left frontal deep white matter, left parietal deep white matter, and left occipital peritrigonal white matter respectively, consistent with acute lacunar size infarcts.      CL-[id number]-2 VIEW   Final Result      1.  No evidence of bowel obstruction.   2.  Lumbar fusion hardware and metallic clips. No other metallic foreign body seen.   3.  Severe degenerative changes bilateral hip joints with likely AVN of the bilateral femoral heads.      EC-ECHOCARDIOGRAM COMPLETE W/O CONT   Final Result      CT-HEAD W/O   Final Result   Addendum (preliminary) 1 of 1   VOALTE message of critical findings sent to Dr. Huizar at 3/24/2024 11:37    AM. I also received confirmation of receipt of VOALTE message back from    the provider.      Final      1.  2 mm focus of hyperdensity in the left frontal white matter. This could be a small hemorrhage versus other etiology as above.   2.  Advanced confluent nonspecific white matter hypoattenuation which is markedly abnormal. Recommend follow-up with brain MRI study.      Based solely on CT findings, the brain injury guideline category is mBIG 1.      SDH < 4mm   IPH < 4mm   SAH < 3 sulci and < 1mm      The original BIG retrospective analysis found radiographic progression in 0% of BIG 1  patients and 2.6% BIG 2.      Efforts are underway to contact referring physician with results at time of dictation.      DX-CHEST-PORTABLE (1 VIEW)   Final Result         Hazy bibasilar opacities, left more than right, atelectasis or infection.          Assessment/Plan  * Hypertensive crisis- (present on admission)  Assessment & Plan  History of HTN, not on home meds per chart review  UDS positive for meth, possible etiology  I am continuing and titrating nicardipine gtt. plan is to slowly decrease blood pressure, target SBP around 160-170.  I discussed plan of care with intensivist Dr. Carrera.    Debility  Assessment & Plan  PT/OT consult requested    Bacteremia due to Gram-negative bacteria  Assessment & Plan  1/2 BC + for GNR  Patient was admitted at [place] on 9/2022 and 12/2022 for left lower extremity abscess that grew enterobacter, enterococcus, deteriorates   No overt murmurs and TTE was unrevealing  Patient has hardware in his back but no back pain on physical exam  Unclear source  Ordered repeat culture blood cultures by critical care team.  I am continuing IV ceftriaxone follow-up on sensitivities.  No right upper quadrant tenderness      Prolonged Q-T interval on ECG  Assessment & Plan  QTc 503  Continue to monitor colitis and replace as needed.  Avoid QT prolonging agents as able  Continue to monitor closely on telemetry.    Abnormal imaging of central nervous system  Assessment & Plan  CT head w/o contrast revealing 2mm focus hyperdensity in left frontal white matter; vascular neurology evaluated patient in ED and think this is an incidental finding and not likely intraparenchymal hemorrhage  MRI completed on 3/25, multiple findings, Neurology (Dr. Sanders) to comment further  Continue to monitor closely on telemetry.  Continue to monitor blood pressure.    Hypophosphatemia  Assessment & Plan  Continue to monitor and replace as needed.    Hypokalemia  Assessment & Plan  Continue to monitor and  replace as needed.    FRANKLYN (acute kidney injury) ([facility])  Assessment & Plan  Unclear baseline, possibly 1.0-1.1 however last stable Cr from 2022  UA relatively bland, protein loss noted  Limit nephrotoxins and renally dose as appropriate  Possibly prerenal, continue LR at 75cc/hr  Ordered repeat lab workup for tomorrow morning.    Acute metabolic encephalopathy  Assessment & Plan  Unclear etiology, possibly multifactorial in setting of meth use, Wernicke's (history of alcohol use, unclear if current), PRES, acute hypoxic respiratory failure  CT head revealed 2mm focus hyperdensity (vascular neurology evaluated patient in ED and think finding is incidental rather than intraparenchymal hemorrhage)  I reviewed finding of MRI brain.  pending Neurology follow-up  Started on empiric IV thiamine   Continue to monitor closely.      Elevated troponin  Assessment & Plan  Possibly related to underlying demand ischemia with decreased renal clearance   Troponins trended  Continue monitor closely on telemetry      HFrEF (heart failure with reduced ejection fraction) ([facility])  Assessment & Plan  No known history of heart failure, possibly meth induced  Echo (3/24): Per report, mild concentric LVH, EF estimated to be 25-30%   Likely initiation of GDMT once patient  is more stable.  He will need follow-up with cardiology for ischemic workup.    Acute hypoxic respiratory failure (HCC)  Assessment & Plan  Unclear if the patient is on oxygen at home, no known history of COPD  Bibasilar opacities L > R, empirically started on Unasyn in the ED but later discontinued as etiology preferred to be pulmonary edema, overall decreasing O2 requirements  Currently is requiring 1 L of oxygen to maintain oxygen saturation.  Continue to monitor.    Positive urine drug screen- (present on admission)  Assessment & Plan  UDS positive for amphetamines, this problem dates back many years with prior positive urine drug screens.    Counseling when  appropriate.    Essential hypertension- (present on admission)  Assessment & Plan  Currently is on amlodipine 5 mg daily with holding parameters.  I am continuing titrating nicardipine gtt.  Continue to monitor closely.        I personally discussed case with intensivist Dr. Carrera regarding Mr. Sanchez current medical condition and plan of care.    I discussed plan of care with bedside RN in ICU.    Patient is critically ill.   The patient continues to have: Hypertensive crisis  The vital organ system that is affected is the: Cardiovascular system  If untreated there is a high chance of deterioration into: Cardiovascular collapse  And eventually death.   The critical care that I am providing today is: I am continuing and titrating nicardipine gtt. as per target systolic blood pressure of 140 to 160 mmHg.  The critical that has been undertaken is medically complex.   There has been no overlap in critical care time.   Critical Care Time not including procedures: 39 minutes

## 2024-03-26 NOTE — ASSESSMENT & PLAN NOTE
UDS positive for amphetamines, this problem dates back many years with prior positive urine drug screens.    Counseling when appropriate.

## 2024-03-26 NOTE — PROGRESS NOTES
"[facility] Progress Note      Admit Date: 3/24/2024    Resident(s): Usman Scott M.D.   Attending:  SHANNA WORKMAN/ Dr. Carrera    Patient ID:    Name:  Andrews Sanchez   YOB: 1960  Age:  63 y.o.  male   MRN:  [id number]    Hospital Course (carried forward and updated):  Per Dr. Mejia, \"Andrews Sanchez is a 63 y.o. male with a past medical history of hypertension and previous admissions for hypertensive crisis and wound care. Patient is unable to provide history; social history, medical history, and family history are obtained from chart review.   Patient was found down for an unknown amount of time covered in feces. Patient was brought in by ambulance, with EMS reporting blood pressures of 260/150. He was administered 5mg IV metoprolol.   Blood pressure was read at 226/143 upon arrival to [facility]. Patient is AxO x 1; malodorous, disheveled with bilateral scleral injection. Patient had chest xray revealing concern for infection vs atelectasis and was treated empirically with unasyn and azithromycin. He underwent CT head, notable for 2mm focus hyperdensity; vascular neurology consulted in ED, think it is an incidental finding and not likely an intraparenchymal hemorrhage.\"    Interval Events:  3/25: Patient unable to follow basic commands but easily arouseable. Patient is able to protect his airway.  Labs were significant for WBC of 10.9, BUN of 23, Cr of 1.6, calcium of 8.4.  MRI is pending.     3/26: Overnight 1/2 (1/4 vials) grew GN rods and started on empiric ceftriaxone. Patient is now more alert but partially orientated (knows only name and partial to location, as patient stated that he is in a hospital in [facility]). MRI resulted, contacted Dr. Sanders for any further Neurology recommendations. Labs were significant for Cr of 1.47.     Consultants:  Critical Care  Neurology     Vitals Range last 24h:  Temp:  [36.1 °C (97 °F)-36.8 °C (98.2 °F)] 36.3 °C (97.4 °F)  Pulse:  [37-88] 59  Resp:  [9-37] " 27  BP: (133-216)/() 195/110  SpO2:  [89 %-99 %] 96 %      Intake/Output Summary (Last 24 hours) at 3/26/2024 1124  Last data filed at 3/26/2024 1000  Gross per 24 hour   Intake 2227.54 ml   Output 2100 ml   Net 127.54 ml        Review of Systems   Unable to perform ROS: Mental status change       PHYSICAL EXAM:  Vitals:    03/26/24 0900 03/26/24 0930 03/26/24 1000 03/26/24 1030   BP: (!) 208/112 (!) 194/128 (!) 197/109 (!) 195/110   Pulse: 61 63 65 (!) 59   Resp: (!) 35 (!) 24 (!) 31 (!) 27   Temp:       TempSrc:       SpO2: 97% 98% 98% 96%   Weight:       Height:        Body mass index is 30.72 kg/m².    O2 therapy: Pulse Oximetry: 96 %, O2 (LPM): 1, O2 Delivery Device: Nasal Cannula    Date 03/26/24 0700 - 03/27/24 0659   Shift 1624-1109 7279-9864 2488-5146 24 Hour Total   INTAKE   I.V. 304.5   304.5     Precedex Volume 4.5   4.5     Volume (mL) (NS infusion) 0   0     Volume (mL) (lactated ringers infusion) 300   300   IV Piggyback 200   200     Volume (mL) (potassium chloride (Kcl) ivpb 10 mEq) 200   200   Shift Total 504.5   504.5   OUTPUT   Shift Total       .5   504.5        Physical Exam  Vitals and nursing note reviewed.   Constitutional:       General: He is not in acute distress.     Appearance: He is obese.   HENT:      Head: Normocephalic and atraumatic.      Right Ear: External ear normal.      Left Ear: External ear normal.      Nose: No rhinorrhea.      Mouth/Throat:      Mouth: Mucous membranes are moist.   Eyes:      Extraocular Movements: Extraocular movements intact.      Conjunctiva/sclera: Conjunctivae normal.   Cardiovascular:      Rate and Rhythm: Normal rate and regular rhythm.      Heart sounds: No murmur heard.     No gallop.   Pulmonary:      Effort: No respiratory distress.      Breath sounds: Normal breath sounds. No stridor. No wheezing or rhonchi.   Abdominal:      General: Bowel sounds are normal. There is no distension.      Tenderness: There is no abdominal  tenderness.   Musculoskeletal:         General: No swelling.      Cervical back: Normal range of motion and neck supple.      Right lower leg: No edema.      Left lower leg: No edema.      Comments: No back pain when palpated or with straight leg elevation   Skin:     General: Skin is warm and dry.      Capillary Refill: Capillary refill takes less than 2 seconds.      Coloration: Skin is not jaundiced.   Neurological:      Mental Status: He is alert. He is disoriented.             Recent Labs     03/24/24  1434 03/24/24  1700 03/25/24  1648 03/26/24  0409 03/26/24  0950   SODIUM  --    < > 143 145 144   POTASSIUM  --    < > 3.6 3.4* 3.8   CHLORIDE  --    < > 107 110 110   CO2  --    < > 22 22 23   BUN  --    < > 22 20 18   CREATININE  --    < > 1.52* 1.43* 1.47*   MAGNESIUM 2.1  --   --   --   --    PHOSPHORUS 2.4*  --   --   --   --    CALCIUM  --    < > 8.3* 8.3* 8.5    < > = values in this interval not displayed.     Recent Labs     03/24/24  1045 03/24/24  1700 03/25/24  1648 03/26/24  0409 03/26/24  0950   ALTSGPT 26  --   --   --   --    ASTSGOT 29  --   --   --   --    ALKPHOSPHAT 125*  --   --   --   --    TBILIRUBIN 1.5  --   --   --   --    GLUCOSE 125*   < > 72 75 77    < > = values in this interval not displayed.     Recent Labs     03/24/24  1045 03/25/24  0615 03/26/24  0146   RBC 5.59 4.85 4.70   HEMOGLOBIN 16.5 14.4 14.0   HEMATOCRIT 49.8 44.0 42.9   PLATELETCT 281 182 218   PROTHROMBTM 15.5*  --   --    APTT 27.5  --   --    INR 1.22*  --   --      Recent Labs     03/24/24  1045 03/25/24  0615 03/26/24  0146   WBC 10.0 10.9* 9.9   NEUTSPOLYS 86.70*  --   --    LYMPHOCYTES 6.10*  --   --    MONOCYTES 6.20  --   --    EOSINOPHILS 0.20  --   --    BASOPHILS 0.40  --   --    ASTSGOT 29  --   --    ALTSGPT 26  --   --    ALKPHOSPHAT 125*  --   --    TBILIRUBIN 1.5  --   --        Meds:   amLODIPine  5 mg      LR   75 mL/hr at 03/26/24 0722    hydrALAZINE  10 mg      cefTRIAXone (ROCEPHIN) IV  2,000 mg       niCARdipine (Cardene) 25 mg in  mL Standard Infusion  0-15 mg/hr Stopped (03/26/24 0146)    [Held by provider] enoxaparin (LOVENOX) injection  40 mg      ondansetron  4 mg      ondansetron  4 mg      dexmedetomidine (Precedex) infusion  0.1-1.5 mcg/kg/hr (Ideal) Stopped (03/26/24 0730)        Procedures:  N/a    Imaging:  MR-BRAIN-W/O   Final Result      1.  Diffuse confluent FLAIR hyperintense signal abnormality throughout the supratentorial white matter including posterior limbs internal capsules, subinsular white matter, as well as thalamic gray matter. Findings would be atypical and extreme for    chronic microvascular ischemic change. Toxic or metabolic leukoencephalopathy or consequence of other diffuse white matter insult including sequela of narcotic or other substance abuse might be considered. The extremely diffuse involvement would also be    very atypical for posterior reversible encephalopathy syndrome (PRES).   2.  There is also diffuse FLAIR signal involving the brainstem, middle cerebellar peduncles, and cerebellar white matter, presumably the same etiology as the supratentorial process.   3.  Innumerable supratentorial and posterior fossa foci of microhemorrhage compatible with the given history of hypertension. One of these foci probably corresponds to the punctate focus of acute hemorrhagic density in the left frontal deep white matter    seen on the CT scan. This focus shows no significant surrounding vasogenic edema or mass effect.   4.  11 mm ovoid focus of FLAIR hyperintensity in the right cerebellar deep white matter likely representing encephalomalacia related to old infarct or other remote insult.   5.  Subcentimeter foci x3 of bright diffusion signal involving the left frontal deep white matter, left parietal deep white matter, and left occipital peritrigonal white matter respectively, consistent with acute lacunar size infarcts.      [id number] VIEW   Final Result      1.  No  evidence of bowel obstruction.   2.  Lumbar fusion hardware and metallic clips. No other metallic foreign body seen.   3.  Severe degenerative changes bilateral hip joints with likely AVN of the bilateral femoral heads.      EC-ECHOCARDIOGRAM COMPLETE W/O CONT   Final Result      CT-HEAD W/O   Final Result   Addendum (preliminary) 1 of 1   VOALTE message of critical findings sent to Dr. Huizar at 3/24/2024 11:37    AM. I also received confirmation of receipt of VOALTE message back from    the provider.      Final      1.  2 mm focus of hyperdensity in the left frontal white matter. This could be a small hemorrhage versus other etiology as above.   2.  Advanced confluent nonspecific white matter hypoattenuation which is markedly abnormal. Recommend follow-up with brain MRI study.      Based solely on CT findings, the brain injury guideline category is mBIG 1.      SDH < 4mm   IPH < 4mm   SAH < 3 sulci and < 1mm      The original BIG retrospective analysis found radiographic progression in 0% of BIG 1 patients and 2.6% BIG 2.      Efforts are underway to contact referring physician with results at time of dictation.      DX-CHEST-PORTABLE (1 VIEW)   Final Result         Hazy bibasilar opacities, left more than right, atelectasis or infection.          ASSESSEMENT and PLAN:    * Hypertensive crisis- (present on admission)  Assessment & Plan  History of HTN, not on home meds per chart review  UDS positive for meth, possible etiology  On nicardipine drip, slowly decrease blood pressure, target SBP around 160-170    Acute metabolic encephalopathy  Assessment & Plan  Unclear etiology, possibly multifactorial in setting of meth use, Wernicke's (history of alcohol use, unclear if current), PRES, acute hypoxic respiratory failure  Blood cultures NGTD  CT head revealed 2mm focus hyperdensity (vascular neurology evaluated patient in ED and think finding is incidental rather than intraparenchymal hemorrhage)  MRI brain  completed, pending Neurology follow-up  Started on empiric IV thiamine     Acute hypoxic respiratory failure (HCC)  Assessment & Plan  Unclear if the patient is on oxygen at home, no known history of COPD  Bibasilar opacities L > R, empirically started on Unasyn in the ED but later discontinued as etiology preferred to be pulmonary edema, overall decreasing O2 requirements  Currently requiring supplemental O2, titrate to >90%    Elevated troponin  Assessment & Plan  Possibly related to underlying demand ischemia with decreased renal clearance   Troponins trended  EKG reviewed, no acute ST changes noted however possible underlying repolarization abnormalities  On telemetry    Debility  Assessment & Plan  PT/OT consult, appreciate help    Bacteremia due to Gram-negative bacteria  Assessment & Plan  1/2 BC + for GNR  Patient was admitted at [facility] on 9/2022 and 12/2022 for left lower extremity abscess that grew enterobacter, enterococcus, deteriorates   No overt murmurs and TTE was unrevealing  Patient has hardware in his back but no back pain on physical exam  Unclear source  Repeat BC ordered for tomorrow AM  Continue empiric ceftriaxone      Prolonged Q-T interval on ECG  Assessment & Plan  QTc 503  Optimize electrolytes  Avoid QT prolonging agents as able  Cardiac monitoring    FRANKLYN (acute kidney injury) ([facility])  Assessment & Plan  Unclear baseline, possibly 1.0-1.1 however last stable Cr from 2022  UA relatively bland, protein loss noted  Limit nephrotoxins and renally dose as appropriate  Possibly prerenal, continue LR at 75cc/hr    HFrEF (heart failure with reduced ejection fraction) ([facility])  Assessment & Plan  No known history of heart failure, possibly meth induced  Echo (3/24): Per report, mild concentric LVH, EF estimated to be 25-30%   Likely initiation of GDMT once patient  is more stable    Positive urine drug screen- (present on admission)  Assessment & Plan  UDS positive for amphetamines, this problem  dates back many years with prior positive urine drug screens    Essential hypertension- (present on admission)  Assessment & Plan  Resume home regimen as clinically appropriate  Query compliance, further conversations with patient when he is no longer encephalopathic    Abnormal imaging of central nervous system  Assessment & Plan  CT head w/o contrast revealing 2mm focus hyperdensity in left frontal white matter; vascular neurology evaluated patient in ED and think this is an incidental finding and not likely intraparenchymal hemorrhage  MRI completed on 3/25, multiple findings, Neurology (Dr. Sanders) to comment further, appreciate help    Hypophosphatemia  Assessment & Plan  On tele  Continue to monitor and replete as indicated    Hypokalemia  Assessment & Plan  On tele  Continue to monitor and replete as indicated      DISPO: Continue ICU stay    CODE STATUS: Full    Quality Measures:  Feeding: NPO  Analgesia: N/a  Sedation: N/a  Thromboprophylaxis: Lovenox  Head of bed: >30 degrees  Ulcer prophylaxis: N/a  Glycemic control: Correctional: N/a / Basal: N/a  Bowel care: bowel regimen  Indwelling lines: PIV  Deescalation of antibiotics: D/c Jennifersyrosalee Scott M.D.

## 2024-03-26 NOTE — ASSESSMENT & PLAN NOTE
Unclear if the patient is on oxygen at home, no known history of COPD  Bibasilar opacities L > R, empirically started on Unasyn in the ED but later discontinued as etiology preferred to be pulmonary edema, overall decreasing O2 requirements  Continue monitor closely in IMCU.  Currently is requiring 6 L of oxygen to maintain oxygen saturation.  His oxygen saturation is significantly better is around 98%.  Requested bedside RN to titrate down oxygen as tolerated.

## 2024-03-26 NOTE — PROGRESS NOTES
1/4 bottles from [facility] collected on 3/24 is growing gram negative rods. No obvious risk factors for pseudomonas infection, hence will start on empiric abx w/ Ceftriaxone. Will follow culture results for speciation.

## 2024-03-26 NOTE — ASSESSMENT & PLAN NOTE
3/31/2024   Unclear baseline, possibly 1.0-1.1 however last stable Cr from 2022  UA relatively bland, protein loss noted  Limit nephrotoxins and renally dose as appropriate  Renal function slightly improved today.  I will repeat BMP at 1 PM.  Continue to monitor closely.

## 2024-03-26 NOTE — PROGRESS NOTES
Critical Care Progress Note    Date of admission  3/24/2024    Chief Complaint  63F ho HTN found down, possibly by neighbor, reportedly in feces.  Unknown LKN.  Lac to ear.  SBP  260/150 with EMS, given metoprolol 5.  226/143 here.  Got hydralazine now cardene.  CTH with bleed vs calcification, neuro unconcerned about bleed but recommending MRI brain to eval possibility of cavernous malformation.  (Dr Erickson HPI 3/24)     Hospital Course    3/25 -remains on nicardipine for hypertension, LOC improved over the course of the day, FRANKLYN slightly worse, MRI revealed diffuse confluent FLAIR hyperintense signal abnormality unclear etiology, innumerable foci of microhemorrhage consistent with hypertension and changes consistent with old lacunar infarcts, blood culture came back positive late for GNRs and patient started on ceftriaxone    Interval Problem Update  Reviewed last 24 hour events:    Blood culture positive last night, started on ceftriaxone  Low BS last night - Rx D10  A&O x2, follows some, ID ROS negative on review with patient  Dexmedetomidine 0.2  Agitated last night  MRI noted, awaiting neuro follow-up  More active and more alert participating as dexmedetomidine wears off  SB/SR 50-70  -200  Troponin level at 63, 61, 63  UO adequate  I/O+ 1.8 L / 24 hours  Actively titrating nicardipine  NS 75 cc/HR  WOB low  Nasal cannula O2 1-2 L  O2 sats 95-99%  Tmax 98.2  WBC 9.9  1/2 positive BC for gram-negative li, ID/sensitivities pending  Ceftriaxone  Urine culture pending  Prior skin abscess well-healed unlikely source  Hemoglobin 14.0,   , K 3.4, HCO3 22, AG 17, calcium 8.3  BUN 20, creatinine 1.43-improved      Wean DEX off  Hold lovenox  D5 if does not take p.o. well  Mobilize, likely benefit from PT/OT  Neuro f/u  Swallow eval, start nutrition/p.o. meds if passes  Replete K  Change IV fluids to lactated Ringer's  Repeat blood cultures tomorrow  Add amlodipine p.o. 5 mg this morning, if still on  nicardipine throughout the day add 5 of amlodipine this p.m. and go to 10 in the AM  Wean off nicardipine as tolerated  Bring systolic BP goal down to 140-160  Add PRN labetalol 10 mg IV every 4  [facility] transfer         YESTERDAY  A&O x1, answers no, poor speech, thumbs up  Lethargic  Denies pain  UDS positive for amphetamines  Dexmedetomidine 0.1, RASS neg-1  SB/SR 50-80s  -230  LR 75  Lactic acid 2.3 improved to 1.6-1.8  Troponin mildly elevated at 63 up from 51  Echo: Mild LVH, severe reduced LV systolic function, EF 25-30% with global hypokinesis and regional variation, RV normal size and mild reduced function, mild MR, grade 2 diastolic dysfunction  Nicardipine infusion actively titrated, 5  UO adequate, fluid balance +745 cc/admit  1 L nasal cannula  WOB low  O2 sats 99%  3/24 CXR with bibasilar atelectasis/opacities  WBC 10.0  Tmax 97.8  Viral PCR panel negative for influenza, RSV and COVID  Hemoglobin 16.5,   BUN 21, creatinine 1.42-bolus improved  Mg 2.1, Phos 2.4, K 3.6, AG 14, HCO3 23      Slowly bring blood pressure goal down over several days  Optimize electrolytes  KUB pending, part of the MRI protocol  MRI brain pending  IS  Neuro checks  F/u Trop    Review of Systems  Review of Systems   Unable to perform ROS: Acuity of condition        Vital Signs for last 24 hours   Temp:  [36.3 °C (97.3 °F)-36.8 °C (98.2 °F)] 36.3 °C (97.4 °F)  Pulse:  [37-88] 59  Resp:  [9-37] 27  BP: (133-216)/() 195/110  SpO2:  [89 %-99 %] 96 %    Hemodynamic parameters for last 24 hours       Respiratory Information for the last 24 hours       Physical Exam   Physical Exam  Constitutional:       General: He is not in acute distress.     Appearance: He is overweight. He is not ill-appearing or diaphoretic.      Interventions: He is sedated. Nasal cannula in place.   HENT:      Head: Normocephalic and atraumatic.      Mouth/Throat:      Mouth: Mucous membranes are moist.   Eyes:      General: No scleral  icterus.  Neck:      Vascular: No JVD.   Cardiovascular:      Rate and Rhythm: Normal rate and regular rhythm.      Pulses: Normal pulses.      Heart sounds: No murmur heard.     No gallop.      Comments:   Pulmonary:      Effort: Pulmonary effort is normal. No respiratory distress.      Breath sounds: No wheezing, rhonchi or rales.   Abdominal:      General: There is no distension.      Tenderness: There is no abdominal tenderness. There is no right CVA tenderness, left CVA tenderness or guarding.   Genitourinary:     Comments: leann  Musculoskeletal:      Cervical back: Neck supple. No rigidity.      Right lower leg: No edema.      Left lower leg: No edema.   Lymphadenopathy:      Cervical: No cervical adenopathy.   Skin:     General: Skin is warm and dry.      Capillary Refill: Capillary refill takes 2 to 3 seconds.      Coloration: Skin is not cyanotic or mottled.      Nails: There is no clubbing.   Neurological:      Mental Status: He is lethargic.      GCS: GCS eye subscore is 4. GCS verbal subscore is 4. GCS motor subscore is 6.      Comments: More alert off dexmedetomidine, moving all 4 to command, oriented x 2, confused at times         Medications  Current Facility-Administered Medications   Medication Dose Route Frequency Provider Last Rate Last Admin    amLODIPine (Norvasc) tablet 5 mg  5 mg Oral Q DAY Antoine Carrera M.D.   5 mg at 03/26/24 0926    lactated ringers infusion   Intravenous Continuous Antoine Carrera M.D. 75 mL/hr at 03/26/24 0722 New Bag at 03/26/24 0722    hydrALAZINE (Apresoline) injection 10 mg  10 mg Intravenous Q4HRS PRN Antoine Carrera M.D.   10 mg at 03/26/24 1138    labetalol (Normodyne/Trandate) injection 10 mg  10 mg Intravenous Q4HRS PRN Antoine Carrera M.D.        cefTRIAXone (Rocephin) syringe 2,000 mg  2,000 mg Intravenous Q24HRS Kiah Gordon M.D.   2,000 mg at 03/26/24 0047    niCARdipine (Cardene) 25 mg in  mL Standard Infusion  0-15 mg/hr Intravenous  Continuous India Acosta M.D. 50 mL/hr at 03/26/24 1212 5 mg/hr at 03/26/24 1212    [Held by provider] enoxaparin (Lovenox) inj 40 mg  40 mg Subcutaneous DAILY AT 1800 India Acosta M.D.   40 mg at 03/25/24 1710    ondansetron (Zofran) syringe/vial injection 4 mg  4 mg Intravenous Q4HRS PRN India Acosta M.D.        ondansetron (Zofran ODT) dispertab 4 mg  4 mg Oral Q4HRS PRN India Acosta M.D.        dexmedetomidine (PRECEDEX) 400 mcg/100mL NS premix infusion  0.1-1.5 mcg/kg/hr (Ideal) Intravenous Continuous India Acosta M.D.   Stopped at 03/26/24 0730       Fluids    Intake/Output Summary (Last 24 hours) at 3/26/2024 1231  Last data filed at 3/26/2024 1000  Gross per 24 hour   Intake 1994.62 ml   Output 1800 ml   Net 194.62 ml       Laboratory      Recent Labs     03/24/24  1434   CPKTOTAL 303*     Recent Labs     03/24/24  1434 03/24/24  1700 03/25/24  1648 03/26/24  0409 03/26/24  0950   SODIUM  --    < > 143 145 144   POTASSIUM  --    < > 3.6 3.4* 3.8   CHLORIDE  --    < > 107 110 110   CO2  --    < > 22 22 23   BUN  --    < > 22 20 18   CREATININE  --    < > 1.52* 1.43* 1.47*   MAGNESIUM 2.1  --   --   --   --    PHOSPHORUS 2.4*  --   --   --   --    CALCIUM  --    < > 8.3* 8.3* 8.5    < > = values in this interval not displayed.     Recent Labs     03/24/24  1045 03/24/24  1700 03/25/24  1648 03/26/24  0409 03/26/24  0950   ALTSGPT 26  --   --   --   --    ASTSGOT 29  --   --   --   --    ALKPHOSPHAT 125*  --   --   --   --    TBILIRUBIN 1.5  --   --   --   --    GLUCOSE 125*   < > 72 75 77    < > = values in this interval not displayed.     Recent Labs     03/24/24  1045 03/25/24  0615 03/26/24  0146   WBC 10.0 10.9* 9.9   NEUTSPOLYS 86.70*  --   --    LYMPHOCYTES 6.10*  --   --    MONOCYTES 6.20  --   --    EOSINOPHILS 0.20  --   --    BASOPHILS 0.40  --   --    ASTSGOT 29  --   --    ALTSGPT 26  --   --    ALKPHOSPHAT 125*  --   --    TBILIRUBIN 1.5  --   --      Recent Labs     03/24/24  1045  03/25/24  0615 03/26/24  0146   RBC 5.59 4.85 4.70   HEMOGLOBIN 16.5 14.4 14.0   HEMATOCRIT 49.8 44.0 42.9   PLATELETCT 281 182 218   PROTHROMBTM 15.5*  --   --    APTT 27.5  --   --    INR 1.22*  --   --        Imaging  X-Ray:  I have personally reviewed the images and compared with prior images.  EKG:  I have personally reviewed the images and compared with prior images.  CT:    Reviewed  Echo:   Reviewed    Assessment/Plan  * Hypertensive crisis- (present on admission)  Assessment & Plan  Systemic blood pressure in the 250 range initially  Likely encephalopathy, FRANKLYN, pulmonary edema and troponin leak from hypertension although rule out other etiologies  Actively titrating nicardipine, slowly bringing down BP goal, will use -160 range  Continue to monitor for signs of hypoperfusion as we come down on BP goal  Starting enteral amlodipine  Adding as needed IV labetalol    Bacteremia due to Gram-negative bacteria  Assessment & Plan  1/2 BC + for GNR  Source?  On review with patient his ID ROS is fairly negative as is his exam  Ceftriaxone  Repeat BC 3/27    Abnormal imaging of central nervous system  Assessment & Plan  CT head revealed a 1.2 mm left frontal hyperdensity  Neurology recommending MRI, complex interpretation reviewed, awaiting neurology follow-up  Microhemorrhages, old lacunar infarcts and diffuse confluent FLAIR hyperintense signal abnormality-toxic metabolic, very atypical for PRES, hypertension related?  Suspect longstanding poorly controlled hypertension and amphetamine abuse with multiple prior episodes of positive drug screens likely contributing to findings above as above?    FRANKLYN (acute kidney injury) (HCC)  Assessment & Plan  Secondary to hypertensive crisis as well as chronically poorly controlled hypertension?  Query CKD with unchanging mild elevation in creatinine, appears euvolemic now  Avoid nephrotoxins  Serial BMP  Monitor UO with Daryl  Renal ultrasound/nephrology consultation as  needed clinically    Acute metabolic encephalopathy  Assessment & Plan  Likely hypertensive in etiology  1 of 2 BC now + for GNR, alternative etiology, on antibiotics awaiting ID and sensitivities, source?  CT head with very small left frontal hyperdensity  MRI completed:  old lacunar infarcts, microhemorrhages, diffuse confluent FLAIR hyperintense signal abnormality-awaiting neurology follow-up  Further evaluation as clinically appropriate  Neurochecks unchanged, okay to reduce to every 2-4    Elevated troponin  Assessment & Plan  Suspect demand ischemia with hypertensive crisis  Serial troponin level out of completeness  EKG not consistent with STEMI  Echo: EF 25-30% with global hypokinesis and regional variation  When neurologically improved consider cardiac consultation prior to discharge, risk stratification?    HFrEF (heart failure with reduced ejection fraction) ([facility])  Assessment & Plan  Controlling blood pressure with nicardipine infusion as well as as needed hydralazine  Forced diuresis as needed with furosemide  EF 25-30% by echo  Query cardiology consultation prior to discharge, would prefer neurological improvement so he can participate in the consultation process    Prolonged Q-T interval on ECG  Assessment & Plan  QTc 503  Continue to optimize electrolytes  Avoid QT prolonging agents as able  Cardiac monitoring    Acute hypoxic respiratory failure (HCC)  Assessment & Plan  Mild hypoxia in part related to hypertensive crisis  RT protocols  I-S when able  Mobilize when able  Follow-up chest x-ray as needed  Weaned to room air, WOB low    Essential hypertension- (present on admission)  Assessment & Plan  Resume home regimen as clinically appropriate?  Unclear about compliance  Start amlodipine 5 may double dose today  Adding labetalol as needed  Continue nicardipine infusion as needed  Continue hydralazine as needed  Continue to slowly reduce BP goal now at 160 systolic    Positive urine drug screen-  (present on admission)  Assessment & Plan  UDS positive for amphetamines, this problem dates back many years with prior positive urine drug screens  Cessation counseling when clinically appropriate    Hypophosphatemia  Assessment & Plan  Replete    Hypokalemia  Assessment & Plan  Replete         VTE:  Lovenox  Ulcer: Not Indicated  Lines: Brito Catheter  Ongoing indication addressed    I have performed a physical exam and reviewed and updated ROS and Plan today (3/26/2024). In review of yesterday's note (3/25/2024), there are no changes except as documented above.     Discussed patient condition and risk of morbidity and/or mortality with RN, RT, Pharmacy, [employer] resident, Charge nurse / hot rounds, and Patient    The patient remains critically ill on nicardipine and dexmedetomidine infusions.  Critical care time = 50 minutes in directly providing and coordinating critical care and extensive data review.  No time overlap and excludes procedures.    Clinically improved although intermittently still requiring dexmedetomidine.  Unknown source of gram-negative il, will treat if its real and not a false positive for now.  Continues on off nicardipine infusion for hypertension, adding amlodipine.  Case reviewed at length with hospitalist team, will transfer to [facility].

## 2024-03-26 NOTE — PROGRESS NOTES
Hypoglycemia Intervention    Hypoglycemia protocol intervention:  Blood glucose 61 at 0507.  Intervention: 25 g IV dextrose per MAR   Repeat blood glucose 144 at 0542.  Intervention: Patient NPO, recheck blood glucose in 1 hour     Dr. Gordon notified of the above at 0508.

## 2024-03-26 NOTE — ASSESSMENT & PLAN NOTE
1/2 BC + for GNR  Source?  On review with patient his ID ROS is fairly negative as is his exam  Ceftriaxone  Repeat BC 3/27

## 2024-03-26 NOTE — ASSESSMENT & PLAN NOTE
Possibly related to underlying demand ischemia with decreased renal clearance   Troponins trended  Continue monitor closely on telemetry

## 2024-03-26 NOTE — ASSESSMENT & PLAN NOTE
CT head w/o contrast revealing 2mm focus hyperdensity in left frontal white matter; vascular neurology evaluated patient in ED and think this is an incidental finding and not likely intraparenchymal hemorrhage  MRI completed on 3/25, multiple findings,   Neurology evaluated him and made recommendations.  Continue to monitor closely on telemetry.  Continue to monitor blood pressure.  I reviewed finding of EEG.  Continue to monitor in IMCU.  Continue monitor closely in IMCU.

## 2024-03-26 NOTE — CARE PLAN
The patient is Watcher - Medium risk of patient condition declining or worsening    Shift Goals  Clinical Goals: Hemodynamics, Neuro  Patient Goals: POLINA  Family Goals: POLINA    Progress made toward(s) clinical / shift goals:    Problem: Safety - Medical Restraint  Goal: Remains free of injury from restraints (Restraint for Interference with Medical Device)  Description: INTERVENTIONS:  1. Determine that other, less restrictive measures have been tried or would not be effective before applying the restraint  2. Evaluate the patient's condition at the time of restraint application  3. Educate patient/family regarding the reason for restraint  4. Q2H: Monitor safety, psychosocial status, comfort, circulation, respiratory status, LOC, nutrition and hydration  Outcome: Progressing  Flowsheets (Taken 3/26/2024 0931)  Addressed this shift: Remains free of injury from restraints (restraint for interference with medical device):   Determine that other, less restrictive measures have been tried or would not be effective before applying the restraint   Evaluate the patient's condition at the time of restraint application   Inform patient/family regarding the reason for restraint   Every 2 hours: Monitor safety, psychosocial status, comfort, nutrition and hydration  Goal: Free from restraint(s) (Restraint for Interference with Medical Device)  Description: INTERVENTIONS:  1.  ONCE/SHIFT or MINIMUM Q12H: Assess and document the continuing need for restraints  2.  Q24H: Continued use of restraint requires LIP to perform face to face examination and written order  3.  Identify and implement measures to help patient regain control  4.  Educate patient/family on discontinuation criteria   5.  Assess patient's understanding and retention of education provided  6.  Assess readiness for release & initiate progressive release per protocol  7.  Identify and document criteria for restraints  Outcome: Progressing  Flowsheets (Taken 3/26/2024  0888)  Addressed this shift: Free from restraint(s) (restraint for interference with medical device):   ONCE/SHIFT or MINIMUM Every 12 hours: Assess and document the continuing need for restraints   Every 24 hours: Continued use of restraint requires Licensed Independent Practitioner to perform face to face examination and written order   Identify and implement measures to help patient regain control     Problem: Pain - Standard  Goal: Alleviation of pain or a reduction in pain to the patient’s comfort goal  Description: Target End Date:  Prior to discharge or change in level of care    Document on Vitals flowsheet    1.  Document pain using the appropriate pain scale per order or unit policy  2.  Educate and implement non-pharmacologic comfort measures (i.e. relaxation, distraction, massage, cold/heat therapy, etc.)  3.  Pain management medications as ordered  4.  Reassess pain after pain med administration per policy  5.  If opiods administered assess patient's response to pain medication is appropriate per POSS sedation scale  6.  Follow pain management plan developed in collaboration with patient and interdisciplinary team (including palliative care or pain specialists if applicable)  Outcome: Progressing  Note: Patient frequently assessed for pain. Patient continues to deny pain      Problem: Hemodynamics  Goal: Patient's hemodynamics, fluid balance and neurologic status will be stable or improve  Description: Target End Date:  Prior to discharge or change in level of care    Document on Assessment and I/O flowsheet templates    1.  Monitor vital signs, pulse oximetry and cardiac monitor per provider order and/or policy  2.  Maintain blood pressure per provider order  3.  Hemodynamic monitoring per provider order  4.  Manage IV fluids and IV infusions  5.  Monitor intake and output  6.  Daily weights per unit policy or provider order  7.  Assess peripheral pulses and capillary refill  8.  Assess color and body  temperature  9.  Position patient for maximum circulation/cardiac output  10. Monitor for signs/symptoms of excessive bleeding  11. Assess mental status, restlessness and changes in level of consciousness  12. Monitor temperature and report fever or hypothermia to provider immediately. Consideration of targeted temperature management.  Outcome: Progressing  Note: Patient on anti hypertensive gtt. Good UOP. Warm extremities, palpable pulses

## 2024-03-26 NOTE — PROGRESS NOTES
4 Eyes Skin Assessment Completed by RIGOBERTO Delong and RIGOBERTO Butler.    Head WDL  Ears Redness R ear sutured  Nose WDL  Mouth WDL  Neck WDL  Breast/Chest Redness  Shoulder Blades WDL  Spine WDL old scar from previous surgery  (R) Arm/Elbow/Hand Bruising, Abrasion, and Scab  (L) Arm/Elbow/Hand Bruising, Abrasion, and Scab  Abdomen WDL  Groin Redness and Rash scabs on srotum  Scrotum/Coccyx/Buttocks Redness and Non-Blanching DTI  (R) Leg Redness and Blanching discoloration  (L) Leg Redness and Blanching old scars discoloration  (R) Heel/Foot/Toe Redness, Blanching, and Bruising potential DTI's on toes  (L) Heel/Foot/Toe Redness, Blanching, Non-Blanching, and Bruising potential dti on toes          Devices In Places Blood Pressure Cuff, Pulse Ox, and Condom Cath      Interventions In Place Heel Mepilex, Sacral Mepilex, TAP System, Pillows, Q2 Turns, and Low Air Loss Mattress    Possible Skin Injury No    Pictures Uploaded Into Epic N/A  Wound Consult Placed N/A  RN Wound Prevention Protocol Ordered No

## 2024-03-26 NOTE — CONSULTS
Neurology STROKE H&P  Neurohospitalist Service, [facility] Neurosciences    Referring Physician: Jayda Johnson MD    STROKE:   Chief Complaint   Patient presents with    ALOC     Patient BIB EMS from home. Patient A/O x2 Person and place. Unknown last known well or baseline.     Head Injury     Blood on right side of head unknown last know well or time down at home. Patient found on ground by neighbor.     Hypertension     BP /150 given 5mg metoprolol IV by EMS per ERP orders.           HPI: Andrews Sanchez is a 63 y.o. right-handed male with multiple medical problems to include but not limited to hypertension, hyperlipidemia and polysubstance abuse who was admitted on 3/24/2024 due to alteration of mental status and unresponsiveness.  Apparently he was found down for an unknown amount of time, covered in feces.  On initial evaluation he was noted to have profoundly high blood pressure with systolic more than 220 diastolic more than 140.  He was found to have acute kidney injuries as well as hypoxic respiratory failure.  Patient underwent a brain CT which revealed advanced white matter hypoattenuation with a 2 mm focus of hyperintensity in the left frontal white matter concerning for small hemorrhage versus cavernous malformation.  Subsequently he underwent a brain MRI which revealed innumerable supratentorial and posterior fossa microhemorrhages with again noted diffuse white matter disease.    Review of systems: In addition to what is detailed in the HPI above, all other systems reviewed and are negative.    Past Medical History:    has a past medical history of Hypercholesteremia, Hypertension, and Muscle disorder.    FHx:  family history includes Hypertension in his father; Lung Disease in his father; Other in his father and mother.    SHx:   reports that he has been smoking cigarettes. He has a 3.2 pack-year smoking history. He has never used smokeless tobacco. He reports that he  does not drink alcohol and does not use drugs.    Allergies:  Allergies   Allergen Reactions    Bee Venom Swelling       Medications:    Current Facility-Administered Medications:     hydrALAZINE (Apresoline) injection 10 mg, 10 mg, Intravenous, Q4HRS PRN, Antoine Carrera M.D., 10 mg at 03/26/24 1138    labetalol (Normodyne/Trandate) injection 10 mg, 10 mg, Intravenous, Q4HRS PRN, Antoine Carrera M.D.    [START ON 3/27/2024] amLODIPine (Norvasc) tablet 10 mg, 10 mg, Oral, Q DAY, Jayda Johnson M.D.    cefTRIAXone (Rocephin) syringe 2,000 mg, 2,000 mg, Intravenous, Q24HRS, Kiah Gordon M.D., 2,000 mg at 03/26/24 0047    niCARdipine (Cardene) 25 mg in  mL Standard Infusion, 0-15 mg/hr, Intravenous, Continuous, India Acosta M.D., Last Rate: 150 mL/hr at 03/26/24 1506, 15 mg/hr at 03/26/24 1506    [Held by provider] enoxaparin (Lovenox) inj 40 mg, 40 mg, Subcutaneous, DAILY AT 1800, India Acosta M.D., 40 mg at 03/25/24 1710    ondansetron (Zofran) syringe/vial injection 4 mg, 4 mg, Intravenous, Q4HRS PRN, India Acosta M.D.    ondansetron (Zofran ODT) dispertab 4 mg, 4 mg, Oral, Q4HRS PRN, India Acosta M.D.    dexmedetomidine (PRECEDEX) 400 mcg/100mL NS premix infusion, 0.1-1.5 mcg/kg/hr (Ideal), Intravenous, Continuous, India Acosta M.D., Last Rate: 3.7 mL/hr at 03/26/24 1519, 0.2 mcg/kg/hr at 03/26/24 1519    Physical Examination:    Vitals:    03/26/24 1400 03/26/24 1415 03/26/24 1430 03/26/24 1445   BP: (!) 170/82 (!) 168/83 (!) 174/117 (!) 176/100   Pulse: 72 72 73 78   Resp: (!) 25 (!) 22 20 (!) 24   Temp:       TempSrc:       SpO2: 92% 94% 92% 93%   Weight:       Height:           General:   Patient is awake and in no acute distress  Neck: Full range of motion  Eyes: Midline, Pupils reactive to light.  CV: RRR  Lungs: No respiratory distress  Extremities: No cyanosis, warm, no significant edema.    NEUROLOGICAL EXAM:   Mental status: Awake, alert but confused and disoriented to  time.  Speech and language: speech is very dysarthric and hard to understand.  He is able to follow commands.  Cranial nerve exam: Pupils are equal, round and reactive to light bilaterally. Visual fields: He appears to have right visual field cut.   Extraocular muscles are intact. Sensation in the face is intact to light touch. Face is symmetric. Hearing to finger rub equal. Palate elevates symmetrically. Shoulder shrug is full. Tongue is midline.  Motor exam: Sustain antigravity in all 4 extremities with no downward drift. Tone is normal. No abnormal movements were seen on exam.  Sensory exam: No sensory deficits identified   Coordination: no gross ataxia noted on exam  Plantar reflexes: Equivocal  Gait: deferred    NIH Stroke Scale:    1a. Level of Consciousness (Alert, drowsy, etc): 0= Alert    1b. LOC Questions (Month, age): 2= Incorrect    1c. LOC Commands (Open/close eyes make fist/let go): 0= Obeys both correctly    2.   Best Gaze (Eyes open - patient follows examiner's finger on face): 0= Normal    3.   Visual Fields (introduce visual stimulus/threat to patient's field quadrants): 1= Partial Hemiania  4.   Facial Paresis (Show teeth, raise eyebrows and squeeze eyes shut): 0= Normal     5a. Motor Arm - Left (Elevate arm to 90 degrees if patient is sitting, 45 degrees if  supine): 0= No drift    5b. Motor Arm - Right (Elevate arm to 90 degrees if patient is sitting, 45 degrees if supine): 0= No drift    6a. Motor Leg - Left (Elevate leg 30 degrees with patient supine): 0= No drift    6b. Motor Leg - Right  (Elevate leg 30 degrees with patient supine): 0= No drift    7.   Limb Ataxia (Finger-nose, heel down shin): 0= No ataxia    8.   Sensory (Pin prick to face, arm, trunk and leg - compare side to side): 0= Normal    9.  Best Language (Name item, describe a picture and read sentences): 1= Mild to moderate aphasia    10. Dysarthria (Evaluate speech clarity by patient repeating listed words): 2= Near to  unintelligible or worse    11. Extinction and Inattention (Use information from prior testing to identify neglect or  double simultaneous stimuli testing): 0= No neglect    Total NIH Score: 6    Baseline modified Cb Scale (MRS): 1 = No significant disability, despite symptoms; able to perform all usual duties and activities    Objective Data:    Labs:  Lab Results   Component Value Date/Time    PROTHROMBTM 15.5 (H) 03/24/2024 10:45 AM    INR 1.22 (H) 03/24/2024 10:45 AM      Lab Results   Component Value Date/Time    WBC 9.9 03/26/2024 01:46 AM    RBC 4.70 03/26/2024 01:46 AM    HEMOGLOBIN 14.0 03/26/2024 01:46 AM    HEMATOCRIT 42.9 03/26/2024 01:46 AM    MCV 91.3 03/26/2024 01:46 AM    MCH 29.8 03/26/2024 01:46 AM    MCHC 32.6 03/26/2024 01:46 AM    MPV 11.0 03/26/2024 01:46 AM    NEUTSPOLYS 86.70 (H) 03/24/2024 10:45 AM    LYMPHOCYTES 6.10 (L) 03/24/2024 10:45 AM    MONOCYTES 6.20 03/24/2024 10:45 AM    EOSINOPHILS 0.20 03/24/2024 10:45 AM    BASOPHILS 0.40 03/24/2024 10:45 AM      Lab Results   Component Value Date/Time    SODIUM 144 03/26/2024 09:50 AM    POTASSIUM 3.8 03/26/2024 09:50 AM    CHLORIDE 110 03/26/2024 09:50 AM    CO2 23 03/26/2024 09:50 AM    GLUCOSE 77 03/26/2024 09:50 AM    BUN 18 03/26/2024 09:50 AM    CREATININE 1.47 (H) 03/26/2024 09:50 AM    BUNCREATRAT 20.0 09/07/2022 08:25 AM    BUNCREATRAT 20 07/12/2016 07:20 AM      Lab Results   Component Value Date/Time    CHOLSTRLTOT 158 03/15/2017 08:05 AM    LDL 81 03/15/2017 08:05 AM    HDL 47 03/15/2017 08:05 AM    TRIGLYCERIDE 148 03/15/2017 08:05 AM       Lab Results   Component Value Date/Time    ALKPHOSPHAT 125 (H) 03/24/2024 10:45 AM    ASTSGOT 29 03/24/2024 10:45 AM    ALTSGPT 26 03/24/2024 10:45 AM    TBILIRUBIN 1.5 03/24/2024 10:45 AM        Imaging/Testing:    I interpreted and/or reviewed the patient's neuroimaging    MR-BRAIN-W/O   Final Result      1.  Diffuse confluent FLAIR hyperintense signal abnormality throughout the  supratentorial white matter including posterior limbs internal capsules, subinsular white matter, as well as thalamic gray matter. Findings would be atypical and extreme for    chronic microvascular ischemic change. Toxic or metabolic leukoencephalopathy or consequence of other diffuse white matter insult including sequela of narcotic or other substance abuse might be considered. The extremely diffuse involvement would also be    very atypical for posterior reversible encephalopathy syndrome (PRES).   2.  There is also diffuse FLAIR signal involving the brainstem, middle cerebellar peduncles, and cerebellar white matter, presumably the same etiology as the supratentorial process.   3.  Innumerable supratentorial and posterior fossa foci of microhemorrhage compatible with the given history of hypertension. One of these foci probably corresponds to the punctate focus of acute hemorrhagic density in the left frontal deep white matter    seen on the CT scan. This focus shows no significant surrounding vasogenic edema or mass effect.   4.  11 mm ovoid focus of FLAIR hyperintensity in the right cerebellar deep white matter likely representing encephalomalacia related to old infarct or other remote insult.   5.  Subcentimeter foci x3 of bright diffusion signal involving the left frontal deep white matter, left parietal deep white matter, and left occipital peritrigonal white matter respectively, consistent with acute lacunar size infarcts.      YH-WHIDXVT-1 VIEW   Final Result      1.  No evidence of bowel obstruction.   2.  Lumbar fusion hardware and metallic clips. No other metallic foreign body seen.   3.  Severe degenerative changes bilateral hip joints with likely AVN of the bilateral femoral heads.      EC-ECHOCARDIOGRAM COMPLETE W/O CONT   Final Result      CT-HEAD W/O   Final Result   Addendum (preliminary) 1 of 1   VOALTE message of critical findings sent to Dr. Huizar at 3/24/2024 11:37    AM. I also received  confirmation of receipt of VOALTE message back from    the provider.      Final      1.  2 mm focus of hyperdensity in the left frontal white matter. This could be a small hemorrhage versus other etiology as above.   2.  Advanced confluent nonspecific white matter hypoattenuation which is markedly abnormal. Recommend follow-up with brain MRI study.      Based solely on CT findings, the brain injury guideline category is mBIG 1.      SDH < 4mm   IPH < 4mm   SAH < 3 sulci and < 1mm      The original BIG retrospective analysis found radiographic progression in 0% of BIG 1 patients and 2.6% BIG 2.      Efforts are underway to contact referring physician with results at time of dictation.      DX-CHEST-PORTABLE (1 VIEW)   Final Result         Hazy bibasilar opacities, left more than right, atelectasis or infection.          Assessment:  Andrews Sanchez is a 63 y.o. right-handed male with multiple medical problems to include but not limited to hypertension, hyperlipidemia and polysubstance abuse who was admitted on 3/24/2024 due to alteration of mental status and unresponsiveness.  Apparently he was found down for an unknown amount of time, covered in feces.  On initial evaluation he was noted to have profoundly high blood pressure with systolic more than 220 diastolic more than 140.  He was found to have acute kidney injuries as well as hypoxic respiratory failure.  Patient underwent a brain CT which revealed advanced white matter hypoattenuation with a 2 mm focus of hyperintensity in the left frontal white matter concerning for small hemorrhage versus cavernous malformation.  Subsequently he underwent a brain MRI which revealed innumerable supratentorial and posterior fossa microhemorrhages with again noted diffuse white matter disease.  There is also few lacunar infarct noted on MRI.  His UDS is positive for methamphetamine, likely because of his cardiomyopathy.      Plan:  -q4h and PRN neuro assessment. VS per  nursing/unit protocol.   -BP goal is normotension, 100-130/60-80. Antihypertensives per primary team.   -PAULINE with cardiomyopathy with ejection fraction of 25% and mildly dilated left atrium, continue telemetry; currently SR. Screen for Afib/arrhythmia.   -Zio patch at discharge  -Obtain CTA of the head and neck.  -ASA 81 mg PO q day and Atorvastatin 80 mg PO q HS. Check lipid panel.  Goal LDL less than 70  -Recommend aggressive BG management per primary team. Avoid IVF with Dextrose. -BG goal . Check hemoglobin A1c.  Goal < 7  -PT/OT/SLP eval and treat for early mobilization.  -Counseled patient at length regarding life style and risk factor modification for secondary stroke prevention and in particular methamphetamine abuse.  -All other medical management per primary team.   -DVT PPX: SCDs.         Please note that this dictation was created using voice recognition software. I have made every reasonable attempt to correct obvious errors, but I expect that there are errors of grammar and possibly content that I did not discover before finalizing the note.       Anson Sanders MD  [facility] Neu[facility] Services

## 2024-03-26 NOTE — ASSESSMENT & PLAN NOTE
3/31/2024   QTc 503  Continue to monitor colitis and replace as needed.  Avoid QT prolonging agents as able  Continue to monitor closely on telemetry.

## 2024-03-26 NOTE — PROGRESS NOTES
4 Eyes Skin Assessment Completed by RIGOBERTO Piña and RIGOBERTO Shay.    Head WDL  Ears Redness, right ear sutures  Nose WDL  Mouth WDL  Neck WDL  Breast/Chest Redness and Rash  Shoulder Blades WDL  Spine WDL, scar  (R) Arm/Elbow/Hand Redness, Blanching, Bruising, Abrasion, and Scab  (L) Arm/Elbow/Hand Redness, Blanching, Bruising, Abrasion, and Scab  Abdomen WDL  Groin Redness, Blanching, Excoriation, and Rash  Scrotum/Coccyx/Buttocks Redness and Non-Blanching, known DTI  (R) Leg Redness, emani, discoloaration   (L) Leg Redness and Blanching, discoloration, redness from old skin graft  (R) Heel/Foot/Toe Redness and Blanching, bruising, old wound  (L) Heel/Foot/Toe Redness and Blanching, old wound          Devices In Places ECG, Blood Pressure Cuff, Pulse Ox, and Nasal Cannula      Interventions In Place Gray Ear Foams, Heel Mepilex, Sacral Mepilex, Heel Float Boots, TAP System, Pillows, Elbow Mepilex, Q2 Turns, Low Air Loss Mattress, Barrier Cream, and Heels Loaded W/Pillows    Possible Skin Injury No    Pictures Uploaded Into Epic Yes  Wound Consult Placed Yes  RN Wound Prevention Protocol Ordered Yes

## 2024-03-26 NOTE — CARE PLAN
The patient is Watcher - Medium risk of patient condition declining or worsening    Shift Goals  Clinical Goals: Improve héctor status, hemodynamic stability  Patient Goals: POLINA  Family Goals: POLINA    Progress made toward(s) clinical / shift goals:      Problem: Safety - Medical Restraint  Goal: Remains free of injury from restraints (Restraint for Interference with Medical Device)  Outcome: Progressing     Problem: Pain - Standard  Goal: Alleviation of pain or a reduction in pain to the patient’s comfort goal  Outcome: Progressing     Problem: Skin Integrity  Goal: Skin integrity is maintained or improved  Outcome: Progressing       Patient is not progressing towards the following goals:      Problem: Hemodynamics  Goal: Patient's hemodynamics, fluid balance and neurologic status will be stable or improve  Outcome: Not Progressing  Titrating Nicardipine to goal -185 mmHg

## 2024-03-26 NOTE — ASSESSMENT & PLAN NOTE
3/31/2024   History of HTN, not on home meds per chart review  UDS positive for meth, possible etiology  He was started on Cardizem gtt.  I am continuing Cardizem gtt. as per target systolic blood pressure of less than 150 mmHg.  His EF is low and if his blood pressure improved plan is to discontinue Cardizem gtt. as is not an ideal medication to use in the setting of low EF.  Blood pressure is still not under control.  I discontinued all drip for blood pressure control and I am increasing more oral medication and as needed medication to achieve better blood pressure control.  Now he is on clonidine and I also added amlodipine.  Continue hydralazine 50 mg every 8 hourly.  Continue to monitor closely in IMCU.

## 2024-03-26 NOTE — ASSESSMENT & PLAN NOTE
"Unclear etiology, possibly multifactorial in setting of meth use, Wernicke's (history of alcohol use, unclear if current), PRES, acute hypoxic respiratory failure  CT head revealed 2mm focus hyperdensity (vascular neurology evaluated patient in ED and think finding is incidental rather than intraparenchymal hemorrhage)  I reviewed finding of MRI brain.    I discussed plan of care with neurologist.  Due to MRI finding neurologist recommended Zio patch at the time of discharge.  Started on empiric IV thiamine   Continue to monitor closely.  I reviewed EEG that showed \"Diffuse slowing of the background, suggestive of diffuse and/or multifocal cerebral dysfunction. This finding might be seen in a myriad of encephalopathies and in itself is a nonspecific finding.\"  Ongoing encephalopathy multifactorial due to hyponatremia and significant brain injury on imaging.  "

## 2024-03-26 NOTE — ASSESSMENT & PLAN NOTE
1/2 BC + for GNR  Patient was admitted at [facility] on 9/2022 and 12/2022 for left lower extremity abscess that grew enterobacter, enterococcus, deteriorates   No overt murmurs and TTE was unrevealing  Patient has hardware in his back but no back pain on physical exam  Unclear source  Culture came back as a contaminant and I discontinued antibiotic to avoid adverse effects.

## 2024-03-26 NOTE — DOCUMENTATION QUERY
[facility]                                                                       Query Response Note      PATIENT:               JAMISON PRADO  ACCT #:                  [id number]  MRN:                     [id number]  :                      1960  ADMIT DATE:       3/24/2024 10:37 AM  DISCH DATE:          RESPONDING  PROVIDER #:        [id number]           QUERY TEXT:    Pt admitted with HTN emergency. Pulmonary Edema and HFrEF s documented in the Medical Record.     Can the acuity of pulmonary edema and HFrEF be further clarified?      The patient's Clinical Indicators include:  Clinical Findings:    3/24:     -CXR: Hazy bibasilar opacities, left more than right, atelectasis or infection.; Mildly prominent cardiopericardial silhouette.  -ECHO: EF 25-30%; Grade 2 DD    -Per H&P: pulmonary vascular congestion; likely pulmonary edema  -Per CC note: likely pulmonary edema d/t HTN crisis      Risk Factors: HTN emergency, meth use, FRANKLYN, medication noncompliance, HFrEF    Treatments: Nicardipine gtt, CC monitoring, Imaging, O2 support, ECHO      Contact me with any questions.    Thank you for your time and attention,  Camelia Christina RN, CDI  [email or web address]  Connect via email, Voalte or messenger.  Options provided:   -- Acute pulmonary edema - cardiac related, (please specify acuity of HFrEF   -- Acute pulmonary edema- non cardiogenic   -- Chronic pulmonary edema - cardiac related, (please specify acuity of HFrEF   -- Chronic pulmonary edema - non cardiogenic   -- Other explanation, (please specify other explanation)   -- Unable to determine      Query created by: Camelia Christina on 3/26/2024 11:08 AM    RESPONSE TEXT:    Acute pulmonary edema-cardiac related - acute associated with severe HTN crisis and noted UDS positive for methamphetamine - both documented already          Electronically signed by:  MICAH PAYTON MD 3/26/2024 3:35  PM

## 2024-03-27 PROBLEM — I63.81 MULTIPLE LACUNAR INFARCTS (HCC): Status: ACTIVE | Noted: 2024-01-01

## 2024-03-27 LAB
ALBUMIN SERPL BCP-MCNC: 3.4 G/DL (ref 3.2–4.9)
ALBUMIN/GLOB SERPL: 1.2 G/DL
ALP SERPL-CCNC: 109 U/L (ref 30–99)
ALT SERPL-CCNC: 27 U/L (ref 2–50)
ANION GAP SERPL CALC-SCNC: 13 MMOL/L (ref 7–16)
AST SERPL-CCNC: 30 U/L (ref 12–45)
BACTERIA BLD CULT: ABNORMAL
BACTERIA BLD CULT: ABNORMAL
BILIRUB SERPL-MCNC: 0.7 MG/DL (ref 0.1–1.5)
BUN SERPL-MCNC: 18 MG/DL (ref 8–22)
CALCIUM ALBUM COR SERPL-MCNC: 9.2 MG/DL (ref 8.5–10.5)
CALCIUM SERPL-MCNC: 8.7 MG/DL (ref 8.5–10.5)
CHLORIDE SERPL-SCNC: 109 MMOL/L (ref 96–112)
CO2 SERPL-SCNC: 21 MMOL/L (ref 20–33)
CREAT SERPL-MCNC: 1.51 MG/DL (ref 0.5–1.4)
ERYTHROCYTE [DISTWIDTH] IN BLOOD BY AUTOMATED COUNT: 50.3 FL (ref 35.9–50)
GFR SERPLBLD CREATININE-BSD FMLA CKD-EPI: 51 ML/MIN/1.73 M 2
GLOBULIN SER CALC-MCNC: 2.8 G/DL (ref 1.9–3.5)
GLUCOSE SERPL-MCNC: 87 MG/DL (ref 65–99)
HCT VFR BLD AUTO: 42.8 % (ref 42–52)
HGB BLD-MCNC: 14.2 G/DL (ref 14–18)
MAGNESIUM SERPL-MCNC: 2 MG/DL (ref 1.5–2.5)
MCH RBC QN AUTO: 30.1 PG (ref 27–33)
MCHC RBC AUTO-ENTMCNC: 33.2 G/DL (ref 32.3–36.5)
MCV RBC AUTO: 90.7 FL (ref 81.4–97.8)
PHOSPHATE SERPL-MCNC: 3.3 MG/DL (ref 2.5–4.5)
PLATELET # BLD AUTO: 230 K/UL (ref 164–446)
PMV BLD AUTO: 10.8 FL (ref 9–12.9)
POTASSIUM SERPL-SCNC: 3.7 MMOL/L (ref 3.6–5.5)
PROT SERPL-MCNC: 6.2 G/DL (ref 6–8.2)
RBC # BLD AUTO: 4.72 M/UL (ref 4.7–6.1)
SIGNIFICANT IND 70042: ABNORMAL
SITE SITE: ABNORMAL
SODIUM SERPL-SCNC: 143 MMOL/L (ref 135–145)
SOURCE SOURCE: ABNORMAL
WBC # BLD AUTO: 9.4 K/UL (ref 4.8–10.8)

## 2024-03-27 PROCEDURE — 84100 ASSAY OF PHOSPHORUS: CPT

## 2024-03-27 PROCEDURE — 700111 HCHG RX REV CODE 636 W/ 250 OVERRIDE (IP)

## 2024-03-27 PROCEDURE — 700101 HCHG RX REV CODE 250

## 2024-03-27 PROCEDURE — 83735 ASSAY OF MAGNESIUM: CPT

## 2024-03-27 PROCEDURE — 700111 HCHG RX REV CODE 636 W/ 250 OVERRIDE (IP): Performed by: INTERNAL MEDICINE

## 2024-03-27 PROCEDURE — 770000 HCHG ROOM/CARE - INTERMEDIATE ICU *

## 2024-03-27 PROCEDURE — A9270 NON-COVERED ITEM OR SERVICE: HCPCS | Performed by: INTERNAL MEDICINE

## 2024-03-27 PROCEDURE — 99232 SBSQ HOSP IP/OBS MODERATE 35: CPT | Performed by: STUDENT IN AN ORGANIZED HEALTH CARE EDUCATION/TRAINING PROGRAM

## 2024-03-27 PROCEDURE — 700101 HCHG RX REV CODE 250: Performed by: INTERNAL MEDICINE

## 2024-03-27 PROCEDURE — 700102 HCHG RX REV CODE 250 W/ 637 OVERRIDE(OP): Performed by: INTERNAL MEDICINE

## 2024-03-27 PROCEDURE — 700105 HCHG RX REV CODE 258: Performed by: INTERNAL MEDICINE

## 2024-03-27 PROCEDURE — 80053 COMPREHEN METABOLIC PANEL: CPT

## 2024-03-27 PROCEDURE — 85027 COMPLETE CBC AUTOMATED: CPT

## 2024-03-27 PROCEDURE — 700117 HCHG RX CONTRAST REV CODE 255: Performed by: INTERNAL MEDICINE

## 2024-03-27 PROCEDURE — 99223 1ST HOSP IP/OBS HIGH 75: CPT | Performed by: PHYSICAL MEDICINE & REHABILITATION

## 2024-03-27 PROCEDURE — 87040 BLOOD CULTURE FOR BACTERIA: CPT

## 2024-03-27 PROCEDURE — 99291 CRITICAL CARE FIRST HOUR: CPT | Performed by: INTERNAL MEDICINE

## 2024-03-27 RX ORDER — ONDANSETRON 4 MG/1
4 TABLET, ORALLY DISINTEGRATING ORAL EVERY 4 HOURS PRN
Status: DISCONTINUED | OUTPATIENT
Start: 2024-03-27 | End: 2024-04-05 | Stop reason: HOSPADM

## 2024-03-27 RX ORDER — ASPIRIN 300 MG/1
300 SUPPOSITORY RECTAL DAILY
Status: DISCONTINUED | OUTPATIENT
Start: 2024-03-27 | End: 2024-03-27

## 2024-03-27 RX ORDER — AMLODIPINE BESYLATE 10 MG/1
10 TABLET ORAL
Status: DISCONTINUED | OUTPATIENT
Start: 2024-03-28 | End: 2024-03-28

## 2024-03-27 RX ORDER — ATORVASTATIN CALCIUM 80 MG/1
80 TABLET, FILM COATED ORAL EVERY EVENING
Status: DISCONTINUED | OUTPATIENT
Start: 2024-03-27 | End: 2024-04-03

## 2024-03-27 RX ORDER — HYDRALAZINE HYDROCHLORIDE 10 MG/1
10 TABLET, FILM COATED ORAL EVERY 8 HOURS
Status: DISCONTINUED | OUTPATIENT
Start: 2024-03-27 | End: 2024-03-27

## 2024-03-27 RX ORDER — HYDRALAZINE HYDROCHLORIDE 10 MG/1
10 TABLET, FILM COATED ORAL EVERY 8 HOURS
Status: DISCONTINUED | OUTPATIENT
Start: 2024-03-27 | End: 2024-03-29

## 2024-03-27 RX ORDER — ATORVASTATIN CALCIUM 80 MG/1
80 TABLET, FILM COATED ORAL EVERY EVENING
Status: DISCONTINUED | OUTPATIENT
Start: 2024-03-27 | End: 2024-03-27

## 2024-03-27 RX ORDER — ASPIRIN 81 MG/1
81 TABLET, CHEWABLE ORAL DAILY
Status: DISCONTINUED | OUTPATIENT
Start: 2024-03-28 | End: 2024-04-03

## 2024-03-27 RX ORDER — ASPIRIN 300 MG/1
300 SUPPOSITORY RECTAL DAILY
Status: DISCONTINUED | OUTPATIENT
Start: 2024-03-28 | End: 2024-04-03

## 2024-03-27 RX ORDER — ASPIRIN 81 MG/1
81 TABLET, CHEWABLE ORAL DAILY
Status: DISCONTINUED | OUTPATIENT
Start: 2024-03-27 | End: 2024-03-27

## 2024-03-27 RX ADMIN — SODIUM CHLORIDE 15 MG/HR: 9 INJECTION, SOLUTION INTRAVENOUS at 19:00

## 2024-03-27 RX ADMIN — SODIUM CHLORIDE 5 MG/HR: 9 INJECTION, SOLUTION INTRAVENOUS at 01:38

## 2024-03-27 RX ADMIN — IOHEXOL 80 ML: 350 INJECTION, SOLUTION INTRAVENOUS at 17:36

## 2024-03-27 RX ADMIN — AMLODIPINE BESYLATE 10 MG: 10 TABLET ORAL at 06:07

## 2024-03-27 RX ADMIN — LABETALOL HYDROCHLORIDE 10 MG: 5 INJECTION INTRAVENOUS at 16:15

## 2024-03-27 RX ADMIN — SODIUM CHLORIDE 2.5 MG/HR: 9 INJECTION, SOLUTION INTRAVENOUS at 09:03

## 2024-03-27 RX ADMIN — SODIUM CHLORIDE 15 MG/HR: 9 INJECTION, SOLUTION INTRAVENOUS at 14:48

## 2024-03-27 RX ADMIN — SODIUM CHLORIDE 12.5 MG/HR: 9 INJECTION, SOLUTION INTRAVENOUS at 12:51

## 2024-03-27 RX ADMIN — SODIUM CHLORIDE 15 MG/HR: 9 INJECTION, SOLUTION INTRAVENOUS at 16:56

## 2024-03-27 RX ADMIN — CEFTRIAXONE SODIUM 2000 MG: 10 INJECTION, POWDER, FOR SOLUTION INTRAVENOUS at 06:09

## 2024-03-27 RX ADMIN — DEXMEDETOMIDINE HYDROCHLORIDE 0.7 MCG/KG/HR: 100 INJECTION, SOLUTION INTRAVENOUS at 15:54

## 2024-03-27 RX ADMIN — HYDRALAZINE HYDROCHLORIDE 20 MG: 20 INJECTION, SOLUTION INTRAMUSCULAR; INTRAVENOUS at 14:09

## 2024-03-27 RX ADMIN — SODIUM CHLORIDE 15 MG/HR: 9 INJECTION, SOLUTION INTRAVENOUS at 20:43

## 2024-03-27 RX ADMIN — DEXMEDETOMIDINE HYDROCHLORIDE 0.8 MCG/KG/HR: 100 INJECTION, SOLUTION INTRAVENOUS at 01:26

## 2024-03-27 RX ADMIN — DEXMEDETOMIDINE HYDROCHLORIDE 1.1 MCG/KG/HR: 100 INJECTION, SOLUTION INTRAVENOUS at 21:25

## 2024-03-27 RX ADMIN — SODIUM CHLORIDE 15 MG/HR: 9 INJECTION, SOLUTION INTRAVENOUS at 22:26

## 2024-03-27 ASSESSMENT — FIBROSIS 4 INDEX
FIB4 SCORE: 1.7
FIB4 SCORE: 1.63

## 2024-03-27 ASSESSMENT — PAIN DESCRIPTION - PAIN TYPE
TYPE: ACUTE PAIN

## 2024-03-27 NOTE — CONSULTS
Physical Medicine and Rehabilitation Consultation              Date of initial consultation: 3/27/2024  Requesting provider: ordered by Jayda Johnson M.D. at 03/27/24 1205    Consulting provider: Nessa Brito D.O.  Reason for consultation: assess for acute inpatient rehab appropriateness  LOS: 3 Day(s)    Chief complaint: AMS, found down by neighbor     HPI: The patient is a 63 y.o.  male with a past medical history of HTN, HLD, history of lumbar fusion, and history of tobacco use;  who presented on 3/24/2024 10:37 AM with AMS after being found down by his neighbor. Per documentation, EMS was activated and patient was only oriented to person and place. Upon eval in the ED,  /143. UDS positive for methamphetamines. CT head obtained showed a 2mm focus of hyper density. MRI brain obtained showed innumerable supratentorial and posterior fossa micro hemorrhage and diffused what matter disease. Neurology was consulted, recommending CTA head and neck, pending. Goal is for normotension. Patient is on a nicardipine drip. Echo obtained showed EF 25-30%. Cardiology consulted for reduced EF, suspected secondary to methamphetamine use. Patient requires a precedex drip due to agitation.  Patient's hospital course has been notable for blood cultures + for sarbjit negative bacteria. Patient is on Rocephin.     Patient seen and examined at bedside, nursing in room. Patient in bilateral restraints, makes mumbling noises, but cannot state his name for me. ROS unable to be obtained.     Social Hx:  Patient lives in [place], per [employer], which is former [facility] converted into apartments per [employer].       Tobacco: Daily smoker   Alcohol: Denies   Drugs: + amphetamine use     THERAPY:  Restrictions: Fall Risk, b/l wrist restraints   PT: Functional mobility   Pending     OT: ADLs  Pending     SLP:   Pending     IMAGING:  MR-BRAIN-W/O  Narrative: 3/25/2024 11:45 AM    HISTORY/REASON FOR EXAM:  ALTERED LEVEL OF  CONSCIOUSNESS, HEAD INJURY, HYPERTENSION; eval hyperdensity seen on CT.    TECHNIQUE/EXAM DESCRIPTION:  MRI of the brain without contrast.    T1 sagittal, T2 fast spin-echo axial, T1 coronal, FLAIR coronal, Diffusion weighted and Apparent Diffusion Coefficient (ADC map) axial images were obtained of the whole brain. Additional FLAIR axial images were obtained.    The study was performed on a [employer] Access Signa 1.5 Carissa MRI scanner.    COMPARISON:  Head CT 3/24/2024    FINDINGS:  Images are considerably degraded by patient motion artifact. The calvariae are unremarkable.  There are no extra-axial fluid collections.    The ventricular system and basal cisterns are within normal limits.    There is a pattern of advanced severe supratentorial white matter disease with extensive confluent areas of bright T2 and FLAIR signal throughout the subcortical and deep white matter of both hemispheres consistent with small vessel ischemic change   versus demyelination or gliosis. This also involves the posterior limbs bilateral internal capsules, posterior subinsular white matter, and thalami.    The diffusion weighted axial images show punctate foci of bright diffusion in the left frontal deep white matter and left parietal deep white matter consistent with acute lacunar size white matter infarct (diffusion-weighted axial image 20, series 17).   Additional subcentimeter focus in the left occipital lobe (diffusion-weighted axial image 17, series 17).    The gradient echo axial images show innumerable punctate foci of old microhemorrhage throughout both cerebral hemispheres. One of these foci would appear to correspond to the hyperdense focus seen on CT and may represent an acute microhemorrhage,   however, there is no surrounding vasogenic edema or mass effect.    There are no mass effects or shift of midline structures.    The brainstem and posterior fossa structures show diffuse FLAIR hyperintensity involving the midbrain, primarily in  the cerebral peduncles, bri, as well as the middle cerebellar peduncles and bilateral cerebellar deep white matter. There is an 11 mm   ovoid focus of FLAIR hypointensity in the right cerebellar deep white matter which may represent encephalomalacia from an old infarct or other remote insult. (FLAIR axial image 8, series 19).    Cerebellar hemispheres also show numerous punctate foci of old microhemorrhage bilaterally. There is at least one microhemorrhage involving the brainstem at the right paramedian bri (gradient echo axial image 9, series 18). The brainstem and cerebellum   show no acute infarct.    Vascular flow voids in the vertebrobasilar and carotid arteries, [place], and dural venous sinuses are intact.    The paranasal sinuses in the field of view are unremarkable.    The mastoids are clear.  Impression: 1.  Diffuse confluent FLAIR hyperintense signal abnormality throughout the supratentorial white matter including posterior limbs internal capsules, subinsular white matter, as well as thalamic gray matter. Findings would be atypical and extreme for   chronic microvascular ischemic change. Toxic or metabolic leukoencephalopathy or consequence of other diffuse white matter insult including sequela of narcotic or other substance abuse might be considered. The extremely diffuse involvement would also be   very atypical for posterior reversible encephalopathy syndrome (PRES).  2.  There is also diffuse FLAIR signal involving the brainstem, middle cerebellar peduncles, and cerebellar white matter, presumably the same etiology as the supratentorial process.  3.  Innumerable supratentorial and posterior fossa foci of microhemorrhage compatible with the given history of hypertension. One of these foci probably corresponds to the punctate focus of acute hemorrhagic density in the left frontal deep white matter   seen on the CT scan. This focus shows no significant surrounding vasogenic edema or mass  effect.  4.  11 mm ovoid focus of FLAIR hyperintensity in the right cerebellar deep white matter likely representing encephalomalacia related to old infarct or other remote insult.  5.  Subcentimeter foci x3 of bright diffusion signal involving the left frontal deep white matter, left parietal deep white matter, and left occipital peritrigonal white matter respectively, consistent with acute lacunar size infarcts.  [id number] VIEW  Narrative: 3/25/2024 7:53 AM    HISTORY/REASON FOR EXAM:  MRI screening.    TECHNIQUE/EXAM DESCRIPTION AND NUMBER OF VIEWS:   1 views of the abdomen.    COMPARISON: None    FINDINGS:  There is no evidence of bowel obstruction. There is a nonspecific bowel gas pattern.  No free intraperitoneal air is identified though evaluation is limited on supine imaging.  No pathologic calcification is noted.    Lumbar fusion hardware and metallic clips. No other metallic foreign body seen.    Severe degenerative change bilateral hip joints with remodeling of the bilateral femoral heads.  Impression: 1.  No evidence of bowel obstruction.  2.  Lumbar fusion hardware and metallic clips. No other metallic foreign body seen.  3.  Severe degenerative changes bilateral hip joints with likely AVN of the bilateral femoral heads.        PROCEDURES:  None     PMH:  Past Medical History:   Diagnosis Date    Hypercholesteremia     Hypertension     Muscle disorder     L4-5 injury, cervical surgery        PSH:  Past Surgical History:   Procedure Laterality Date    TX SPLIT GRFT,HEAD,FAC,HAND,FEET <100SQCM Left 9/14/2022    Procedure: LEFT THIGH TO LEG SPLIT THICKNESS SKIN GRAFT;  Surgeon: Taqueria Guerrier M.D.;  Location: [facility];  Service: Orthopedics    LAMINOTOMY  1990    L4-5-S1 fusion        FHX:  Family History   Problem Relation Age of Onset    Other Mother         heart stopped from morphine    Hypertension Father     Other Father         aortic aneurysm     Lung Disease  "Father         smoker       Medications:  Current Facility-Administered Medications   Medication Dose    hydrALAZINE (Apresoline) tablet 10 mg  10 mg    aspirin (Asa) chewable tab 81 mg  81 mg    Or    aspirin (Asa) suppository 300 mg  300 mg    atorvastatin (Lipitor) tablet 80 mg  80 mg    labetalol (Normodyne/Trandate) injection 10 mg  10 mg    amLODIPine (Norvasc) tablet 10 mg  10 mg    hydrALAZINE (Apresoline) injection 20 mg  20 mg    cefTRIAXone (Rocephin) syringe 2,000 mg  2,000 mg    niCARdipine (Cardene) 25 mg in  mL Standard Infusion  0-15 mg/hr    [Held by provider] enoxaparin (Lovenox) inj 40 mg  40 mg    ondansetron (Zofran) syringe/vial injection 4 mg  4 mg    ondansetron (Zofran ODT) dispertab 4 mg  4 mg    dexmedetomidine (PRECEDEX) 400 mcg/100mL NS premix infusion  0.1-1.5 mcg/kg/hr (Ideal)       Allergies:  Allergies   Allergen Reactions    Bee Venom Swelling         Physical Exam:  Vitals: BP (!) 163/92   Pulse 72   Temp 35.8 °C (96.5 °F) (Temporal)   Resp (!) 21   Ht 1.778 m (5' 10\")   Wt 94.1 kg (207 lb 7.3 oz)   SpO2 92%   Gen: NAD, restless in bed   Head:  NC/AT  Eyes/ Nose/ Mouth: PERRL, but does not track light   Cardio: RRR, good distal perfusion, warm extremities  Pulm: normal respiratory effort, no cyanosis   Abd: Soft NTND,  Ext: No peripheral edema. No calf tenderness. No clubbing. B/l wrist restraints   Mental status: unable to complete orientation questions       CRANIAL NERVES:  2,3: does not track visual stimuli, pupils react to light   5: sensation intact to light touch bilaterally and symmetric  7: no facial asymmetry  8:possible hard of hearing?   12: tongue protrudes midline    Motor:moves all 4 limbs, but does not follow commands for MMT     Sensory:   intact to light touch through out    No clonus at bilateral ankles  Negative Rubio b/l     Tone: no spasticity noted, no cogwheeling noted    Labs: Reviewed and significant for   Recent Labs     03/25/24  0615 " 03/26/24  0146 03/27/24  0450   RBC 4.85 4.70 4.72   HEMOGLOBIN 14.4 14.0 14.2   HEMATOCRIT 44.0 42.9 42.8   PLATELETCT 182 218 230     Recent Labs     03/26/24  0409 03/26/24  0950 03/27/24  1000   SODIUM 145 144 143   POTASSIUM 3.4* 3.8 3.7   CHLORIDE 110 110 109   CO2 22 23 21   GLUCOSE 75 77 87   BUN 20 18 18   CREATININE 1.43* 1.47* 1.51*   CALCIUM 8.3* 8.5 8.7     Recent Results (from the past 24 hour(s))   CBC without Differential    Collection Time: 03/27/24  4:50 AM   Result Value Ref Range    WBC 9.4 4.8 - 10.8 K/uL    RBC 4.72 4.70 - 6.10 M/uL    Hemoglobin 14.2 14.0 - 18.0 g/dL    Hematocrit 42.8 42.0 - 52.0 %    MCV 90.7 81.4 - 97.8 fL    MCH 30.1 27.0 - 33.0 pg    MCHC 33.2 32.3 - 36.5 g/dL    RDW 50.3 (H) 35.9 - 50.0 fL    Platelet Count 230 164 - 446 K/uL    MPV 10.8 9.0 - 12.9 fL   Comp Metabolic Panel    Collection Time: 03/27/24 10:00 AM   Result Value Ref Range    Sodium 143 135 - 145 mmol/L    Potassium 3.7 3.6 - 5.5 mmol/L    Chloride 109 96 - 112 mmol/L    Co2 21 20 - 33 mmol/L    Anion Gap 13.0 7.0 - 16.0    Glucose 87 65 - 99 mg/dL    Bun 18 8 - 22 mg/dL    Creatinine 1.51 (H) 0.50 - 1.40 mg/dL    Calcium 8.7 8.5 - 10.5 mg/dL    Correct Calcium 9.2 8.5 - 10.5 mg/dL    AST(SGOT) 30 12 - 45 U/L    ALT(SGPT) 27 2 - 50 U/L    Alkaline Phosphatase 109 (H) 30 - 99 U/L    Total Bilirubin 0.7 0.1 - 1.5 mg/dL    Albumin 3.4 3.2 - 4.9 g/dL    Total Protein 6.2 6.0 - 8.2 g/dL    Globulin 2.8 1.9 - 3.5 g/dL    A-G Ratio 1.2 g/dL   PHOSPHORUS    Collection Time: 03/27/24 10:00 AM   Result Value Ref Range    Phosphorus 3.3 2.5 - 4.5 mg/dL   MAGNESIUM    Collection Time: 03/27/24 10:00 AM   Result Value Ref Range    Magnesium 2.0 1.5 - 2.5 mg/dL   ESTIMATED GFR    Collection Time: 03/27/24 10:00 AM   Result Value Ref Range    GFR (CKD-EPI) 51 (A) >60 mL/min/1.73 m 2         ASSESSMENT:  Patient is a 63 y.o. male admitted with [facility] Code / Diagnosis to Support: 0002.1 - Brain Dysfunction:  Non-Traumatic  See DISPO details below for recommendations on appropriate level of rehab for this diagnosis.    Barriers to transfer include: Insurance authorization, TCCs to verify disposition, medical clearance and bed availability     Assessment and Plan:  Left acute lacunar infarcts  Encephalopathy   - admitted with AMS, found down  - CT head showed evidence of 2mm focus of hyperintensity in the left frontal matter concern for small hemorrhage   - MRI brain showed innumerable supratentorial and posterior fossa micro hemorrhages with Left sided acute lacunar infarcts   - neurology consulted  - goal for normotension, on nicardipine drip   - on ASA and statin  - PT/OT pending     Hypertensive Crisis  - admitted with SBP up to 250  - on nicardipine drip for BP control     Agitation  - enceophlopathy suspected possible due to micro hemorrhages  - on precedex drip for agitation   - agitation may be due to visual impairment an possible hearing impairment   - ordered amplifyer to assist with eliminating agitation if due to hearing impairment     Bacteremia   - blood cultures + for GNR   - on Rocephin   - pending repeat blood cultures   - encephalopathy possibly due to sepsis     Reduced EF   - Echo obtained showed EF of 35-30%  - HFrEF likely secondary to methamphatamine use   - cardiology following       DISPO:  - patient is currently functioning below their level of baseline, recommend post acute rehab  - patient is not currently a candidate for IRF due to his current functional status, inability to participate with therapy thus far, need for prece dex drip, and likely lack of DC support   - anticipate that patient will need SNF level therapy for prolonged access to reahb when appropriate   - PM&R will follow peripherally for assistance with  encephalopathy       Medical Complexity:  Encephalopathy   Agitation   Left acute lacuna infarcts  Hypertensive Crisis  Bacteremia   Reduced EF   Impaired mobility and ADLs        DVT PPX: SCDs      Thank you for allowing us to participate in the care of this patient.     Patient was seen for 81 minutes on unit/floor of which > 50% of time was spent on counseling and coordination of care regarding the above, including prognosis, risk reduction, benefits of treatment, and options for next stage of care.    Nessa Brito D.O.   Physical Medicine and Rehabilitation     Please note that this dictation was created using voice recognition software. I have made every reasonable attempt to correct obvious errors, but there may be errors of grammar and possibly content that I did not discover before finalizing the note.

## 2024-03-27 NOTE — ASSESSMENT & PLAN NOTE
3/31/2024   MRI showed acute lacunar size infarcts.  Patient will need Zio patch at the time of discharge.  Ordered HbA1c and lipid profile.  I started him on aspirin and atorvastatin.  I reviewed CT head and neck that did not show any acute abnormalities.  After discussing it with neurology I started him on DVT prophylaxis with Lovenox.  I ordered non-tPA stroke order set.  Continue monitor closely in IMCU  He remains agitated.  Overall prognosis is guarded as he has significant brain injury along with that he has severely compromised EF.

## 2024-03-27 NOTE — DISCHARGE PLANNING
[facility] Transitional Care Coordination    Referral from:  Dr. Johnson  Insurance Provider on Facesheet:  [employer] HMO  Potential Rehab diagnosis:  Stroke/Encephalopathy    Chart review indicates patient has ongoing medical management and may have therapy needs to possibly meet inpatient rehab facility criteria with the goal of returning to community.      D/C Support:  TBD - Daughter?    Physiatry to consult.  Stroke NIH [6].  Nicardipine gtt, precedex gtt.  Bilateral wrist restraints.  Would welcome PT/OT as clinically appropriate.      Last Covid test:    Thank you for the referral.

## 2024-03-27 NOTE — THERAPY
Physical Therapy Contact Note    Patient Name: Andrews Sanchez  Age:  63 y.o., Sex:  male  Medical Record #: [id number]  Today's Date: 3/27/2024       03/27/24 0942   Initial Contact Note    Initial Contact Note Order Received and Verified, Physical Therapy Evaluation in Progress with Full Report to Follow.   Interdisciplinary Plan of Care Collaboration   IDT Collaboration with  Nursing;Occupational Therapist   Collaboration Comments PT consult received, chart reviewed. Spoke with RN who stated that pt is on sedation medication, not participatory at this time. Will attempt to eval at another time. OT notified.   Session Information   Date / Session Number  3/37 hold (eval)

## 2024-03-27 NOTE — PROGRESS NOTES
Hospital Medicine Daily Progress Note    Date of Service  3/27/2024    Chief Complaint  Andrews Sanchez is a 63 y.o. male admitted 3/24/2024 after he found down    Hospital Course    63 y.o. male with past medical history of hypertension hypercholesterolemia who presented 3/24/2024 after he found down for an unknown amount of 9 and he was covered in feces.  Patient found to have significant elevated blood pressure and as per the chart review the blood pressure was 226/143 on upon arrival to [facility].  He was diagnosed with hypertensive crisis, hypoxic respiratory failure, acute encephalopathy and acute kidney injury.  On imaging studies patient found to have 1.2 mm left frontal hyperdensity.  He underwent MRI brain that showed diffuse confluent FLAIR.  He was placed on Precedex gtt. for ongoing agitation and also he was placed on nicardipine infusion for uncontrolled hypertension.     On March 26, 2024 intensivist Dr. Carrera requested to transfer care to hospitalist service.    Interval Problem Update    03/27/24    I evaluated and examined him at the bedside.  Blood pressure still running on the higher side.  I added hydralazine.  Due to patient renal functions I did not ACE or ARB's.  He also found to have mild bradycardia and I did not add beta-blocker.  He still requiring Cardizem gtt.  I am continuing and titrating as per target blood pressure of 120-150.  He is still requiring Precedex gtt. for ongoing agitation.  I am continuing and titrating as per RASS of -1 to +1.  I requested consultation from cardiology for PAULINE as recommended by neurology to evaluate for infective endocarditis.  I discussed plan of care with neurologist Dr. Zapata.  I attempted to call patient's daughter but it was the wrong number.  I ordered CTA head and neck as recommended by neurology.  I evaluated multiple times throughout the day.  Plan is to get feeding tube for nutrition as well as for medication.        I have  discussed this patient's plan of care and discharge plan at IDT rounds today with Case Management, Nursing, Nursing leadership, and other members of the IDT team.    Consultants/Specialty  critical care    Code Status  Full Code    Disposition  The patient is not medically cleared for discharge to home or a post-acute facility.      I have placed the appropriate orders for post-discharge needs.    Review of Systems  Review of Systems   Unable to perform ROS: Mental acuity        Physical Exam  Temp:  [36.3 °C (97.3 °F)-37 °C (98.6 °F)] 36.8 °C (98.2 °F)  Pulse:  [51-88] 57  Resp:  [12-82] 12  BP: (110-218)/() 154/72  SpO2:  [88 %-100 %] 93 %    Physical Exam  Vitals reviewed.   Constitutional:       General: He is in acute distress.      Appearance: He is ill-appearing.      Comments: Soft restraints   HENT:      Head: Normocephalic and atraumatic. No contusion.      Right Ear: External ear normal.      Left Ear: External ear normal.      Nose: Nose normal.      Mouth/Throat:      Pharynx: No oropharyngeal exudate.   Eyes:      General:         Right eye: No discharge.         Left eye: No discharge.      Pupils: Pupils are equal, round, and reactive to light.   Cardiovascular:      Rate and Rhythm: Normal rate and regular rhythm.      Heart sounds: No murmur heard.     No friction rub. No gallop.   Pulmonary:      Effort: Pulmonary effort is normal.      Breath sounds: No wheezing or rhonchi.   Abdominal:      General: Bowel sounds are normal. There is no distension.      Palpations: Abdomen is soft.      Tenderness: There is no abdominal tenderness. There is no rebound.   Musculoskeletal:         General: No swelling or tenderness. Normal range of motion.      Cervical back: No rigidity. No muscular tenderness.   Skin:     General: Skin is warm and dry.      Coloration: Skin is not jaundiced.   Neurological:      General: No focal deficit present.      Mental Status: He is alert.      Cranial Nerves: No  cranial nerve deficit.      Sensory: No sensory deficit.   Psychiatric:         Mood and Affect: Mood normal.         Fluids    Intake/Output Summary (Last 24 hours) at 3/27/2024 0816  Last data filed at 3/27/2024 0508  Gross per 24 hour   Intake 2655.53 ml   Output 1100 ml   Net 1555.53 ml       Laboratory  Recent Labs     03/25/24  0615 03/26/24  0146 03/27/24  0450   WBC 10.9* 9.9 9.4   RBC 4.85 4.70 4.72   HEMOGLOBIN 14.4 14.0 14.2   HEMATOCRIT 44.0 42.9 42.8   MCV 90.7 91.3 90.7   MCH 29.7 29.8 30.1   MCHC 32.7 32.6 33.2   RDW 50.0 50.6* 50.3*   PLATELETCT 182 218 230   MPV 11.3 11.0 10.8     Recent Labs     03/25/24  1648 03/26/24  0409 03/26/24  0950   SODIUM 143 145 144   POTASSIUM 3.6 3.4* 3.8   CHLORIDE 107 110 110   CO2 22 22 23   GLUCOSE 72 75 77   BUN 22 20 18   CREATININE 1.52* 1.43* 1.47*   CALCIUM 8.3* 8.3* 8.5     Recent Labs     03/24/24  1045   APTT 27.5   INR 1.22*               Imaging  MR-BRAIN-W/O   Final Result      1.  Diffuse confluent FLAIR hyperintense signal abnormality throughout the supratentorial white matter including posterior limbs internal capsules, subinsular white matter, as well as thalamic gray matter. Findings would be atypical and extreme for    chronic microvascular ischemic change. Toxic or metabolic leukoencephalopathy or consequence of other diffuse white matter insult including sequela of narcotic or other substance abuse might be considered. The extremely diffuse involvement would also be    very atypical for posterior reversible encephalopathy syndrome (PRES).   2.  There is also diffuse FLAIR signal involving the brainstem, middle cerebellar peduncles, and cerebellar white matter, presumably the same etiology as the supratentorial process.   3.  Innumerable supratentorial and posterior fossa foci of microhemorrhage compatible with the given history of hypertension. One of these foci probably corresponds to the punctate focus of acute hemorrhagic density in the left  frontal deep white matter    seen on the CT scan. This focus shows no significant surrounding vasogenic edema or mass effect.   4.  11 mm ovoid focus of FLAIR hyperintensity in the right cerebellar deep white matter likely representing encephalomalacia related to old infarct or other remote insult.   5.  Subcentimeter foci x3 of bright diffusion signal involving the left frontal deep white matter, left parietal deep white matter, and left occipital peritrigonal white matter respectively, consistent with acute lacunar size infarcts.      [id number] VIEW   Final Result      1.  No evidence of bowel obstruction.   2.  Lumbar fusion hardware and metallic clips. No other metallic foreign body seen.   3.  Severe degenerative changes bilateral hip joints with likely AVN of the bilateral femoral heads.      EC-ECHOCARDIOGRAM COMPLETE W/O CONT   Final Result      CT-HEAD W/O   Final Result   Addendum (preliminary) 1 of 1   VOALTE message of critical findings sent to Dr. Huizar at 3/24/2024 11:37    AM. I also received confirmation of receipt of VOALTE message back from    the provider.      Final      1.  2 mm focus of hyperdensity in the left frontal white matter. This could be a small hemorrhage versus other etiology as above.   2.  Advanced confluent nonspecific white matter hypoattenuation which is markedly abnormal. Recommend follow-up with brain MRI study.      Based solely on CT findings, the brain injury guideline category is mBIG 1.      SDH < 4mm   IPH < 4mm   SAH < 3 sulci and < 1mm      The original BIG retrospective analysis found radiographic progression in 0% of BIG 1 patients and 2.6% BIG 2.      Efforts are underway to contact referring physician with results at time of dictation.      DX-CHEST-PORTABLE (1 VIEW)   Final Result         Hazy bibasilar opacities, left more than right, atelectasis or infection.           Assessment/Plan  * Hypertensive crisis- (present on admission)  Assessment &  Plan  3/27/2024   History of HTN, not on home meds per chart review  UDS positive for meth, possible etiology  I am continuing and titrating nicardipine gtt. plan is to slowly decrease blood pressure, target SBP around 140-160  I added hydralazine p.o. to control blood pressure on p.o. meds so we can wean him off from nicardipine gtt.    Multiple lacunar infarcts (HCC)  Assessment & Plan  MRI showed acute lacunar size infarcts.  Patient will need Zio patch at the time of discharge.  Ordered HbA1c and lipid profile.  I started him on aspirin and atorvastatin.  I discussed with neurologist and I ordered CTA head and neck.  I ordered non-tPA stroke order set.    Debility  Assessment & Plan  PT/OT consult requested    Bacteremia due to Gram-negative bacteria  Assessment & Plan  1/2 BC + for GNR  Patient was admitted at [facility] on 9/2022 and 12/2022 for left lower extremity abscess that grew enterobacter, enterococcus, deteriorates   No overt murmurs and TTE was unrevealing  Patient has hardware in his back but no back pain on physical exam  Unclear source  Ordered repeat culture blood cultures by critical care team.  I am continuing IV ceftriaxone follow-up on sensitivities.  Repeat blood cultures this morning.  Continue to monitor closely.      Prolonged Q-T interval on ECG  Assessment & Plan  3/27/2024   QTc 503  Continue to monitor colitis and replace as needed.  Avoid QT prolonging agents as able  Continue to monitor closely on telemetry.    Abnormal imaging of central nervous system  Assessment & Plan  CT head w/o contrast revealing 2mm focus hyperdensity in left frontal white matter; vascular neurology evaluated patient in ED and think this is an incidental finding and not likely intraparenchymal hemorrhage  MRI completed on 3/25, multiple findings,   Neurology evaluated him and made recommendations.  Continue to monitor closely on telemetry.  Continue to monitor blood pressure.    Hypophosphatemia  Assessment  & Plan  3/27/2024   Continue to monitor and replace as needed.    Hypokalemia  Assessment & Plan  3/27/2024   Continue to monitor and replace as needed.    FRANKLYN (acute kidney injury) ([facility])  Assessment & Plan  3/27/2024   Unclear baseline, possibly 1.0-1.1 however last stable Cr from 2022  UA relatively bland, protein loss noted  Limit nephrotoxins and renally dose as appropriate  Renal functions remain similar.  Ordered repeat lab workup for tomorrow morning.    Acute metabolic encephalopathy  Assessment & Plan  Unclear etiology, possibly multifactorial in setting of meth use, Wernicke's (history of alcohol use, unclear if current), PRES, acute hypoxic respiratory failure  CT head revealed 2mm focus hyperdensity (vascular neurology evaluated patient in ED and think finding is incidental rather than intraparenchymal hemorrhage)  I reviewed finding of MRI brain.    I discussed plan of care with neurologist.  Due to MRI finding neurologist recommended Zio patch at the time of discharge.  Started on empiric IV thiamine   Continue to monitor closely.  I am continuing and titrating Precedex as per RASS of -1 to +1.      Elevated troponin  Assessment & Plan  Possibly related to underlying demand ischemia with decreased renal clearance   Troponins trended  Continue monitor closely on telemetry      HFrEF (heart failure with reduced ejection fraction) ([facility])  Assessment & Plan  3/27/2024   No known history of heart failure, possibly meth induced  Echo (3/24): Per report, mild concentric LVH, EF estimated to be 25-30%   Likely initiation of GDMT once patient  is more stable.  He will need follow-up with cardiology for ischemic workup.    Acute hypoxic respiratory failure (HCC)  Assessment & Plan  Unclear if the patient is on oxygen at home, no known history of COPD  Bibasilar opacities L > R, empirically started on Unasyn in the ED but later discontinued as etiology preferred to be pulmonary edema, overall decreasing O2  requirements  Now resolved    Positive urine drug screen- (present on admission)  Assessment & Plan  UDS positive for amphetamines, this problem dates back many years with prior positive urine drug screens.    Counseling when appropriate.    Essential hypertension- (present on admission)  Assessment & Plan  I increased the dose of his amlodipine 10 mg started from this morning.  Due to ongoing uncontrolled hypertension I started him on hydralazine.  I am holding ACE and ARB's due to renal functions and he has mild bradycardia I did not start him on beta-blocker.  I am continuing titrating nicardipine gtt as per target systolic blood pressure of 140-160.  Continue to monitor closely.      I personally discussed case with neurologist Dr. Zapata regarding patient current medical condition and plan of care.  I requested consultation with cardiology for PAULINE as recommended by neurology.    Patient is critically ill.   The patient continues to have: Uncontrolled hypertension and agitation  The vital organ system that is affected is the: Cardiovascular system and CNS  If untreated there is a high chance of deterioration into: Cardiovascular collapse  And eventually death.   The critical care that I am providing today is: I am continuing and titrating nicardipine gtt. as per target systolic blood pressure of 140 to 160 mmHg.  I am also continuing and titrating Precedex gtt. as per target RASS of -1 to +1.  The critical that has been undertaken is medically complex.   There has been no overlap in critical care time.   Critical Care Time not including procedures: 41 minutes     I discussed with neurology regarding pharmacological DVT prophylaxis and Dr. Zapata recommending to hold until we rule out infective endocarditis.  VTE prophylaxis:   SCDs/TEDs      I have performed a physical exam and reviewed and updated ROS and Plan today (3/27/2024). In review of yesterday's note (3/26/2024), there are no changes except  as documented above.

## 2024-03-27 NOTE — CARE PLAN
The patient is Watcher - Medium risk of patient condition declining or worsening    Shift Goals  Clinical Goals: hemodynamic stability, [id number] checks  Patient Goals: POLINA  Family Goals: POLINA    Progress made toward(s) clinical / shift goals:    Problem: Safety - Medical Restraint  Goal: Remains free of injury from restraints (Restraint for Interference with Medical Device)  Outcome: Progressing  Goal: Free from restraint(s) (Restraint for Interference with Medical Device)  Outcome: Progressing     Problem: Pain - Standard  Goal: Alleviation of pain or a reduction in pain to the patient’s comfort goal  Outcome: Progressing     Problem: Skin Integrity  Goal: Skin integrity is maintained or improved  Outcome: Progressing     Problem: Fall Risk  Goal: Patient will remain free from falls  Outcome: Progressing     Problem: Hemodynamics  Goal: Patient's hemodynamics, fluid balance and neurologic status will be stable or improve  Outcome: Progressing     Problem: Nutrition  Goal: Patient's nutritional and fluid intake will be adequate or improve  Outcome: Progressing     Problem: Urinary Elimination  Goal: Establish and maintain regular urinary output  Outcome: Progressing       Patient is not progressing towards the following goals:      Problem: Knowledge Deficit - Standard  Goal: Patient and family/care givers will demonstrate understanding of plan of care, disease process/condition, diagnostic tests and medications  Outcome: Not Progressing     Problem: Psychosocial  Goal: Patient's level of anxiety will decrease  Outcome: Not Progressing  Goal: Patient's ability to verbalize feelings about condition will improve  Outcome: Not Progressing

## 2024-03-27 NOTE — DISCHARGE PLANNING
Case Management Discharge Planning    Admission Date: 3/24/2024  GMLOS: 3  ALOS: 3    DME Needed: No    Action(s) Taken:     Pt admitted on 3/24 for hypertensive crisis, encephalopathy, and abnormal imaging of CNS. Neurology following: q4 neuro checks and BP goals (Cardene drip). Pt is currently A&Ox1, bilat wrist restraints, no O2 (RA), on precedex drip.     RN CM attempted to call daughter Cheyenne [phone] and LV to complete assessment. RN CM will call back at later time.  PT/OT/SLP pending eval/recommendations  PMR order placed     Bedside RN stated number on file wasn't the pt's daughter and is attempting to get consent. RN CM reached out to  to perform NOK search. RN ADALID was able to find son-in-law and daughter. Updated facesheet:     Cheyenne Barlow (daughter) #[phone]   Vicente Cain (son-in-law) #[phone]       Escalations Completed: None    Medically Clear: No    Next Steps:     CM to complete assessment, awaiting medical clearance, possible placement    Barriers to Discharge: Medical clearance, restraints/mental status, possible placement

## 2024-03-27 NOTE — CONSULTS
Cardiology Initial Consultation    Date of Service  3/27/2024    Referring Physician  CAMILLE Reyes*    Reason for Consultation  New onset heart failure with reduced ejection fraction    History of Presenting Illness  Andrews Sanchez is a 63 y.o. male with a past medical history of hypertension, dyslipidemia, muscle disorder who presented 3/24/2024 with with noted altered level of consciousness largely found down with disheveled appearance    He been his usual state of health but was ultimately found down on 3/24/2024 by a neighbor.  History is largely obtained through chart review.  Patient was noted to be covered in feces and her ambulates/EMS report his blood pressure was noted to be 260/150 mmHg.  He was given Lopressor IV in the field.  His blood pressure still severely elevated with hypertensive emergency being noted at 226/143 mmHg.  Patient at that time was alert and oriented x 1.  Cardiology service was asked to engage in the patient's care given that he has new onset heart failure with reduced ejection fraction.    His cardiovascular workup at this time has Echocardiogram performed on March 24, 2024 with no prior study for comparison with known mild concentric left trickle hypertrophy severely reduced LV systolic function with estimate ejection fraction 25 to 30% and preserved RV systolic function with moderate mitral annular calcification mild mitral valve regurgitation to consisted of    Review of Systems  Review of Systems   Reason unable to perform ROS: Patient currently not able to provide history.       Past Medical History   has a past medical history of Hypercholesteremia, Hypertension, and Muscle disorder.    Surgical History   has a past surgical history that includes laminotomy (1990) and pr split grft,head,fac,hand,feet <100sqcm (Left, 9/14/2022).    Family History  family history includes Hypertension in his father; Lung Disease in his father; Other in his father and  mother.    Social History   reports that he has been smoking cigarettes. He has a 3.2 pack-year smoking history. He has never used smokeless tobacco. He reports that he does not drink alcohol and does not use drugs.    Medications  None       Allergies  Allergies   Allergen Reactions    Bee Venom Swelling       Vital signs in last 24 hours  Temp:  [35.8 °C (96.5 °F)-37 °C (98.6 °F)] 35.8 °C (96.5 °F)  Pulse:  [49-88] 72  Resp:  [11-52] 21  BP: (110-214)/() 163/92  SpO2:  [88 %-100 %] 92 %    Physical Exam  Physical Exam  Constitutional:       Appearance: He is ill-appearing.   HENT:      Head: Normocephalic and atraumatic.      Ears:      Comments: Right ear with crusted coagulated blood     Mouth/Throat:      Mouth: Mucous membranes are moist.      Pharynx: Oropharynx is clear.   Eyes:      Extraocular Movements: Extraocular movements intact.      Conjunctiva/sclera: Conjunctivae normal.   Cardiovascular:      Rate and Rhythm: Normal rate and regular rhythm.      Pulses: Normal pulses.      Heart sounds: Normal heart sounds. No murmur heard.     No friction rub. No gallop.   Pulmonary:      Effort: Pulmonary effort is normal.      Breath sounds: Normal breath sounds.   Abdominal:      General: Bowel sounds are normal.      Palpations: Abdomen is soft.   Musculoskeletal:         General: Normal range of motion.      Cervical back: Normal range of motion and neck supple.      Right lower leg: No edema.      Left lower leg: No edema.      Comments: Significant toenail distortion likely secondary to fungal infection   Skin:     General: Skin is warm and dry.   Neurological:      Mental Status: He is alert.      Comments: Able to follow minimal commands         Lab Review  Lab Results   Component Value Date/Time    WBC 9.4 03/27/2024 04:50 AM    RBC 4.72 03/27/2024 04:50 AM    HEMOGLOBIN 14.2 03/27/2024 04:50 AM    HEMATOCRIT 42.8 03/27/2024 04:50 AM    MCV 90.7 03/27/2024 04:50 AM    MCH 30.1 03/27/2024 04:50 AM  "   MCHC 33.2 03/27/2024 04:50 AM    MPV 10.8 03/27/2024 04:50 AM      Lab Results   Component Value Date/Time    SODIUM 143 03/27/2024 10:00 AM    POTASSIUM 3.7 03/27/2024 10:00 AM    CHLORIDE 109 03/27/2024 10:00 AM    CO2 21 03/27/2024 10:00 AM    GLUCOSE 87 03/27/2024 10:00 AM    BUN 18 03/27/2024 10:00 AM    CREATININE 1.51 (H) 03/27/2024 10:00 AM    BUNCREATRAT 20.0 09/07/2022 08:25 AM    BUNCREATRAT 20 07/12/2016 07:20 AM      Lab Results   Component Value Date/Time    ASTSGOT 30 03/27/2024 10:00 AM    ALTSGPT 27 03/27/2024 10:00 AM     Lab Results   Component Value Date/Time    CHOLSTRLTOT 158 03/15/2017 08:05 AM    LDL 81 03/15/2017 08:05 AM    HDL 47 03/15/2017 08:05 AM    TRIGLYCERIDE 148 03/15/2017 08:05 AM    TROPONINT 63 (H) 03/25/2024 04:48 PM       No results for input(s): \"NTPROBNP\" in the last 72 hours.    Cardiac Imaging and Procedures Review  EKG:  My personal interpretation of the EKG dated 3/25/2024 is sinus bradycardia with LVH and prolonged QT interval    Echocardiogram: As noted above    Cardiac Catheterization: Not applicable    Imaging  Chest X-Ray:  hazy bibasilar opacities with left being greater than right with concern for atelectasis or infection         Assessment/Plan  1.  Altered level of consciousness with unclear etiology suspected multiple cerebral microinfarcts  2.  Hypertensive emergency  3.  Newly diagnosed heart failure with reduced ejection fraction  4.  Positive methamphetamine  5.  Longstanding hypertension with multiple episodes of hypertensive urgency/emergency  6.  Acute kidney injury    Clinically, he is doing very poorly and appears to be in critical condition.  He is largely not able to provide history.  He presented in hypertensive emergency with an altered level of consciousness with unclear etiology however imaging suggested prior micro infarctions in the cerebrum.  The echocardiogram demonstrated newly diagnosed heart failure with reduced ejection fraction I " suspect that this is multifactorial in nature given that he has methamphetamine positive as well as likely having poorly controlled hypertension as evidenced by his current blood pressure and prior admissions for hypertensive urgency/emergency.  At this time his blood pressure has been followed closely by the neurology service and hospitalist service.  I suspect that he is still undergoing some mild permissive hypertension.  The initiation of goal-directed medical therapy will be very challenging at this time given that patient is not able to follow commands.  We will try and institute appropriate medical therapy to help with his overall hypertension.  He would be an appropriate candidate when clinically appropriate for beta-blocker therapy, SGLT2 inhibitor, spironolactone therapy/mineral receptor antagonist, Entresto therapy when taking p.o. medications.  At this time he remains on nicardipine continuous infusion and we will work closely with neurology regarding timing and instituting of other antihypertensive medications to help improve his overall blood pressure.  Patient was noted to have a positive 1 out of 2 blood cultures demonstrating diphtheriods..  I suspect that this was a contaminant given that he does not have stigmata of endocarditis and does not have noted significant murmurs on physical examination or on his echocardiogram    He was advised that he remain free of methamphetamine given that there is a strong correlation with methamphetamine and cardiomyopathy.  Would not be unreasonable to consider an ischemic workup however the timing of that remains to be determined and this likely will be pursued in the outpatient setting with further recommendations.He does not appear to be acutely decompensated heart failure at this time    In terms of other medical conditions I will defer to the primary medicine service.    Thank you so much for his consultation cardiology service will continue to follow and  institute goal-directed medical therapy when clinically appropriate          Please contact me with any questions.    Jesus Nazario D.O.   Cardiologist, [facility]  [phone]

## 2024-03-27 NOTE — PROGRESS NOTES
4 Eyes Skin Assessment Completed by RIGOBERTO Bowden and RIGOBERTO Schroeder.    Head WDL  Ears Redness (Laceration)  Nose WDL  Mouth WDL  Neck WDL  Breast/Chest WDL  Shoulder Blades WDL  Spine WDL  (R) Arm/Elbow/Hand Redness, Blanching, and Bruising  (L) Arm/Elbow/Hand Redness, Blanching, and Bruising  Abdomen WDL  Groin Excoriation  Scrotum/Coccyx/Buttocks Redness and Blanching  (R) Leg Redness and Blanching  (L) Leg Scar  (R) Heel/Foot/Toe Redness and Blanching  (L) Heel/Foot/Toe Redness, Blanching, and Discoloration          Devices In Places ECG, Blood Pressure Cuff, Pulse Ox, Condom Cath, and Nasal Cannula      Interventions In Place Gray Ear Foams, Sacral Mepilex, TAP System, Pillows, Elbow Mepilex, Low Air Loss Mattress, and Heels Loaded W/Pillows    Possible Skin Injury No    Pictures Uploaded Into Epic Yes  Wound Consult Placed N/A  RN Wound Prevention Protocol Ordered N/A

## 2024-03-27 NOTE — PROGRESS NOTES
Neurology Progress Note  Neurohospitalist Service, [facility]    Referring Physician: NILE Reyes      Interval History: Patient seen and examined this PM. Patient confused, altered, unable to follow commands or answer questions. Patient moaned and was in mild distress and seen moving bilateral upper and lower extremities spontaneously. No reported acute overnight events    Review of systems: In addition to what is detailed in the HPI and/or updated in the interval history, all other systems reviewed and are negative.    Past Medical History, Past Surgical History and Social History reviewed and unchanged from prior    Medications:    Current Facility-Administered Medications:     hydrALAZINE (Apresoline) tablet 10 mg, 10 mg, Oral, Q8HRS, Jayda Johnson M.D.    aspirin (Asa) chewable tab 81 mg, 81 mg, Oral, DAILY **OR** aspirin (Asa) suppository 300 mg, 300 mg, Rectal, DAILY, Jayda Johnson M.D.    atorvastatin (Lipitor) tablet 80 mg, 80 mg, Oral, Q EVENING, Jayda Johnson M.D.    labetalol (Normodyne/Trandate) injection 10 mg, 10 mg, Intravenous, Q4HRS PRN, Antoine Carrera M.D., 10 mg at 03/26/24 1939    amLODIPine (Norvasc) tablet 10 mg, 10 mg, Oral, Q DAY, Jayda Johnson M.D., 10 mg at 03/27/24 0607    hydrALAZINE (Apresoline) injection 20 mg, 20 mg, Intravenous, Q4HRS PRN, Jayda Johnson M.D.    cefTRIAXone (Rocephin) syringe 2,000 mg, 2,000 mg, Intravenous, Q24HRS, Kiah Gordon M.D., 2,000 mg at 03/27/24 0609    niCARdipine (Cardene) 25 mg in  mL Standard Infusion, 0-15 mg/hr, Intravenous, Continuous, India Acosta M.D., Last Rate: 25 mL/hr at 03/27/24 0508, 2.5 mg/hr at 03/27/24 0508    [Held by provider] enoxaparin (Lovenox) inj 40 mg, 40 mg, Subcutaneous, DAILY AT 1800, India Acosta M.D., 40 mg at 03/25/24 1710    ondansetron (Zofran) syringe/vial injection 4 mg, 4 mg, Intravenous, Q4HRS PRN, India Acosta M.D.     "ondansetron (Zofran ODT) dispertab 4 mg, 4 mg, Oral, Q4HRS PRN, India Acosta M.D.    dexmedetomidine (PRECEDEX) 400 mcg/100mL NS premix infusion, 0.1-1.5 mcg/kg/hr (Ideal), Intravenous, Continuous, India Acosta M.D., Last Rate: 9.1 mL/hr at 03/27/24 0652, 0.5 mcg/kg/hr at 03/27/24 0652    Physical Examination:   BP (!) 154/72   Pulse (!) 57   Temp 36.8 °C (98.2 °F) (Temporal)   Resp 12   Ht 1.778 m (5' 10\")   Wt 94.1 kg (207 lb 7.3 oz)   SpO2 93%   BMI 29.77 kg/m²     NEUROLOGICAL EXAM:     MENTAL STATUS: Awake, alert, agitated and altered, could answer no orientation questions or follow commands  LANG/SPEECH: Practically globally aphasic but likely in setting of what appears to be an acute encephalopathy    CRANIAL NERVES:  II: Pupils equal small, round and sluggishly reactive, no BTT elicited  III, IV, VI: Slight left gaze preference, would not follow command to tract finger  V: Unable to assess  VII: no asymmetry, no nasolabial fold flattening  VIII: Unable to determine  IX, X: Unable to visualize  XI: Shoulders equal height  XII: midline tongue protrusion    MOTOR: Spontaneously movement in BL UE/LE, patient in restraints and difficult to ascertain full strength but grossly appears symmetric and full. Bilateral lower extremities limited evaluation but equal tone, intact tone.     REFLEXES: bilateral flexor plantar response, no clonus  SENSORY: Withdraws, grimaces and says \"ouch\" to pinch in bilateral upper and lower extremities   COORD: Could not assess  GAIT: Deferrred    Objective Data:    Labs:  Lab Results   Component Value Date/Time    PROTHROMBTM 15.5 (H) 03/24/2024 10:45 AM    INR 1.22 (H) 03/24/2024 10:45 AM      Lab Results   Component Value Date/Time    WBC 9.4 03/27/2024 04:50 AM    RBC 4.72 03/27/2024 04:50 AM    HEMOGLOBIN 14.2 03/27/2024 04:50 AM    HEMATOCRIT 42.8 03/27/2024 04:50 AM    MCV 90.7 03/27/2024 04:50 AM    MCH 30.1 03/27/2024 04:50 AM    MCHC 33.2 03/27/2024 04:50 AM    " MPV 10.8 03/27/2024 04:50 AM    NEUTSPOLYS 86.70 (H) 03/24/2024 10:45 AM    LYMPHOCYTES 6.10 (L) 03/24/2024 10:45 AM    MONOCYTES 6.20 03/24/2024 10:45 AM    EOSINOPHILS 0.20 03/24/2024 10:45 AM    BASOPHILS 0.40 03/24/2024 10:45 AM      Lab Results   Component Value Date/Time    SODIUM 144 03/26/2024 09:50 AM    POTASSIUM 3.8 03/26/2024 09:50 AM    CHLORIDE 110 03/26/2024 09:50 AM    CO2 23 03/26/2024 09:50 AM    GLUCOSE 77 03/26/2024 09:50 AM    BUN 18 03/26/2024 09:50 AM    CREATININE 1.47 (H) 03/26/2024 09:50 AM    BUNCREATRAT 20.0 09/07/2022 08:25 AM    BUNCREATRAT 20 07/12/2016 07:20 AM      Lab Results   Component Value Date/Time    CHOLSTRLTOT 158 03/15/2017 08:05 AM    LDL 81 03/15/2017 08:05 AM    HDL 47 03/15/2017 08:05 AM    TRIGLYCERIDE 148 03/15/2017 08:05 AM       Lab Results   Component Value Date/Time    ALKPHOSPHAT 125 (H) 03/24/2024 10:45 AM    ASTSGOT 29 03/24/2024 10:45 AM    ALTSGPT 26 03/24/2024 10:45 AM    TBILIRUBIN 1.5 03/24/2024 10:45 AM        Imaging/Testing:    I interpreted and/or reviewed the patient's neuroimaging    MR-BRAIN-W/O   Final Result      1.  Diffuse confluent FLAIR hyperintense signal abnormality throughout the supratentorial white matter including posterior limbs internal capsules, subinsular white matter, as well as thalamic gray matter. Findings would be atypical and extreme for    chronic microvascular ischemic change. Toxic or metabolic leukoencephalopathy or consequence of other diffuse white matter insult including sequela of narcotic or other substance abuse might be considered. The extremely diffuse involvement would also be    very atypical for posterior reversible encephalopathy syndrome (PRES).   2.  There is also diffuse FLAIR signal involving the brainstem, middle cerebellar peduncles, and cerebellar white matter, presumably the same etiology as the supratentorial process.   3.  Innumerable supratentorial and posterior fossa foci of microhemorrhage  compatible with the given history of hypertension. One of these foci probably corresponds to the punctate focus of acute hemorrhagic density in the left frontal deep white matter    seen on the CT scan. This focus shows no significant surrounding vasogenic edema or mass effect.   4.  11 mm ovoid focus of FLAIR hyperintensity in the right cerebellar deep white matter likely representing encephalomalacia related to old infarct or other remote insult.   5.  Subcentimeter foci x3 of bright diffusion signal involving the left frontal deep white matter, left parietal deep white matter, and left occipital peritrigonal white matter respectively, consistent with acute lacunar size infarcts.      [id number] VIEW   Final Result      1.  No evidence of bowel obstruction.   2.  Lumbar fusion hardware and metallic clips. No other metallic foreign body seen.   3.  Severe degenerative changes bilateral hip joints with likely AVN of the bilateral femoral heads.      EC-ECHOCARDIOGRAM COMPLETE W/O CONT   Final Result      CT-HEAD W/O   Final Result   Addendum (preliminary) 1 of 1   VOALTE message of critical findings sent to Dr. Huizar at 3/24/2024 11:37    AM. I also received confirmation of receipt of VOALTE message back from    the provider.      Final      1.  2 mm focus of hyperdensity in the left frontal white matter. This could be a small hemorrhage versus other etiology as above.   2.  Advanced confluent nonspecific white matter hypoattenuation which is markedly abnormal. Recommend follow-up with brain MRI study.      Based solely on CT findings, the brain injury guideline category is mBIG 1.      SDH < 4mm   IPH < 4mm   SAH < 3 sulci and < 1mm      The original BIG retrospective analysis found radiographic progression in 0% of BIG 1 patients and 2.6% BIG 2.      Efforts are underway to contact referring physician with results at time of dictation.      DX-CHEST-PORTABLE (1 VIEW)   Final Result         Hazy bibasilar  opacities, left more than right, atelectasis or infection.          Assessment and Plan:  63 y.o. right-handed male with multiple medical problems to include but not limited to hypertension, hyperlipidemia and polysubstance abuse who was admitted on 3/24/2024 due to alteration of mental status and unresponsiveness.  Apparently he was found down for an unknown amount of time, covered in feces.  On initial evaluation he was noted to have profoundly high blood pressure with systolic more than 220 diastolic more than 140.  He was found to have acute kidney injuries as well as hypoxic respiratory failure.  Patient underwent a brain CT which revealed advanced white matter hypoattenuation with a 2 mm focus of hyperintensity in the left frontal white matter concerning for small hemorrhage versus cavernous malformation.  Subsequently, brain MRI revealed innumerable supratentorial and posterior fossa microhemorrhages small, punctate regions of stroke in left frontal and left parietal white matter, numerous confluent areas of T2 FLAIR hyperintensity concerning for underlying toxic or metabolic leukoencephalopathy including portions of midbrain, cerebral peduncles, ons, bilateral cerebellar WM. UDS also noted to be positive for methamphetamine which could explain cause of uncontrolled blood pressure, new cardiomyopathy and diffuse confluent white matter disease out of proportion to routine essential HTN and uncontrolled BP. Patient is pending follow up CTA head and neck for further stroke work up. Patient had TTE w/ evidence of reduced ejection fraction, likely drug induced but defer to primary team further work up. In the setting of 1/2 BC for GNR and scattered cerebral hemorrhages not completely accounted for by HTN disease or cerebral amyloid angiopathy, do have concern for potential underlying endocarditis. Recommend primary team proceed with obtaining PAULINE and subsequent CTA head and neck should provide better visualization  of intracerebral arterial tree to screen for mycotic aneurysms.     Problem list:  -- Cerebral microhemorrhages  -- Acute Lt-MCA punctate infarctions  -- Leukoencephalopathy    Recommendations:  -- q4h and PRN neuro assessment. VS per nursing/unit protocol.   -- BP goal is normotension, 100-130/60-80. Antihypertensives per primary team.   -- PAULINE with cardiomyopathy with ejection fraction of 25% and mildly dilated left atrium, continue telemetry; currently SR. Screen for Afib/arrhythmia.   -- Zio patch at discharge  -- Would hold ASA 81 mg PO q day and Atorvastatin 80 mg PO q HS titrate to goal LDL less than 70  -- TTE: EF 25-30%, global hypokinesis, mildly dilated left atrium, JENNIFER 47  -- Proceed with PAULINE to rule out endocarditis  -- Follow up repeat blood cultures   -- Recommend aggressive BG management per primary team. Avoid IVF with Dextrose. -BG goal . Check hemoglobin A1c.  Goal < 7  -- PT/OT/SLP eval and treat for early mobilization.  -- Counseled patient at length regarding life style and risk factor modification for secondary stroke prevention and in particular methamphetamine abuse.  -- All other medical management per primary team.   -- DVT PPX: SCDs.     I have performed a physical exam and reviewed and updated ROS and Plan today (3/27/2024).     Ten Ramon III, MD  Vascular Neurology, [facility]

## 2024-03-28 LAB
ALBUMIN SERPL BCP-MCNC: 3.1 G/DL (ref 3.2–4.9)
ALBUMIN/GLOB SERPL: 1.2 G/DL
ALP SERPL-CCNC: 94 U/L (ref 30–99)
ALT SERPL-CCNC: 22 U/L (ref 2–50)
ANION GAP SERPL CALC-SCNC: 15 MMOL/L (ref 7–16)
AST SERPL-CCNC: 25 U/L (ref 12–45)
BACTERIA BLD CULT: NORMAL
BACTERIA BLD CULT: NORMAL
BILIRUB SERPL-MCNC: 0.8 MG/DL (ref 0.1–1.5)
BUN SERPL-MCNC: 20 MG/DL (ref 8–22)
CALCIUM ALBUM COR SERPL-MCNC: 9.1 MG/DL (ref 8.5–10.5)
CALCIUM SERPL-MCNC: 8.4 MG/DL (ref 8.5–10.5)
CHLORIDE SERPL-SCNC: 113 MMOL/L (ref 96–112)
CHOLEST SERPL-MCNC: 148 MG/DL (ref 100–199)
CO2 SERPL-SCNC: 17 MMOL/L (ref 20–33)
CREAT SERPL-MCNC: 1.63 MG/DL (ref 0.5–1.4)
CRP SERPL HS-MCNC: 1.35 MG/DL (ref 0–0.75)
ERYTHROCYTE [DISTWIDTH] IN BLOOD BY AUTOMATED COUNT: 52.4 FL (ref 35.9–50)
EST. AVERAGE GLUCOSE BLD GHB EST-MCNC: 126 MG/DL
GFR SERPLBLD CREATININE-BSD FMLA CKD-EPI: 46 ML/MIN/1.73 M 2
GLOBULIN SER CALC-MCNC: 2.6 G/DL (ref 1.9–3.5)
GLUCOSE SERPL-MCNC: 90 MG/DL (ref 65–99)
HBA1C MFR BLD: 6 % (ref 4–5.6)
HCT VFR BLD AUTO: 41.1 % (ref 42–52)
HDLC SERPL-MCNC: 41 MG/DL
HGB BLD-MCNC: 13.4 G/DL (ref 14–18)
LDLC SERPL CALC-MCNC: 95 MG/DL
MAGNESIUM SERPL-MCNC: 2 MG/DL (ref 1.5–2.5)
MCH RBC QN AUTO: 29.9 PG (ref 27–33)
MCHC RBC AUTO-ENTMCNC: 32.6 G/DL (ref 32.3–36.5)
MCV RBC AUTO: 91.7 FL (ref 81.4–97.8)
PHOSPHATE SERPL-MCNC: 3.8 MG/DL (ref 2.5–4.5)
PLATELET # BLD AUTO: 225 K/UL (ref 164–446)
PMV BLD AUTO: 10.4 FL (ref 9–12.9)
POTASSIUM SERPL-SCNC: 3.6 MMOL/L (ref 3.6–5.5)
PREALB SERPL-MCNC: 11.3 MG/DL (ref 18–38)
PROT SERPL-MCNC: 5.7 G/DL (ref 6–8.2)
RBC # BLD AUTO: 4.48 M/UL (ref 4.7–6.1)
SIGNIFICANT IND 70042: NORMAL
SIGNIFICANT IND 70042: NORMAL
SITE SITE: NORMAL
SITE SITE: NORMAL
SODIUM SERPL-SCNC: 145 MMOL/L (ref 135–145)
SOURCE SOURCE: NORMAL
SOURCE SOURCE: NORMAL
TRIGL SERPL-MCNC: 59 MG/DL (ref 0–149)
WBC # BLD AUTO: 8.1 K/UL (ref 4.8–10.8)

## 2024-03-28 PROCEDURE — 99291 CRITICAL CARE FIRST HOUR: CPT | Performed by: INTERNAL MEDICINE

## 2024-03-28 PROCEDURE — 770000 HCHG ROOM/CARE - INTERMEDIATE ICU *

## 2024-03-28 PROCEDURE — 80061 LIPID PANEL: CPT

## 2024-03-28 PROCEDURE — 83735 ASSAY OF MAGNESIUM: CPT

## 2024-03-28 PROCEDURE — 95822 EEG COMA OR SLEEP ONLY: CPT | Performed by: PSYCHIATRY & NEUROLOGY

## 2024-03-28 PROCEDURE — 85027 COMPLETE CBC AUTOMATED: CPT

## 2024-03-28 PROCEDURE — 700105 HCHG RX REV CODE 258: Performed by: INTERNAL MEDICINE

## 2024-03-28 PROCEDURE — 700101 HCHG RX REV CODE 250

## 2024-03-28 PROCEDURE — 97602 WOUND(S) CARE NON-SELECTIVE: CPT

## 2024-03-28 PROCEDURE — 84100 ASSAY OF PHOSPHORUS: CPT

## 2024-03-28 PROCEDURE — A9270 NON-COVERED ITEM OR SERVICE: HCPCS | Performed by: INTERNAL MEDICINE

## 2024-03-28 PROCEDURE — 95822 EEG COMA OR SLEEP ONLY: CPT | Mod: 26 | Performed by: PSYCHIATRY & NEUROLOGY

## 2024-03-28 PROCEDURE — 99233 SBSQ HOSP IP/OBS HIGH 50: CPT | Mod: 25 | Performed by: STUDENT IN AN ORGANIZED HEALTH CARE EDUCATION/TRAINING PROGRAM

## 2024-03-28 PROCEDURE — 700111 HCHG RX REV CODE 636 W/ 250 OVERRIDE (IP)

## 2024-03-28 PROCEDURE — 700111 HCHG RX REV CODE 636 W/ 250 OVERRIDE (IP): Performed by: INTERNAL MEDICINE

## 2024-03-28 PROCEDURE — 4A10X4Z MONITORING OF CENTRAL NERVOUS ELECTRICAL ACTIVITY, EXTERNAL APPROACH: ICD-10-PCS | Performed by: PSYCHIATRY & NEUROLOGY

## 2024-03-28 PROCEDURE — 80053 COMPREHEN METABOLIC PANEL: CPT

## 2024-03-28 PROCEDURE — 83036 HEMOGLOBIN GLYCOSYLATED A1C: CPT

## 2024-03-28 PROCEDURE — 99233 SBSQ HOSP IP/OBS HIGH 50: CPT | Performed by: INTERNAL MEDICINE

## 2024-03-28 PROCEDURE — 700102 HCHG RX REV CODE 250 W/ 637 OVERRIDE(OP): Performed by: INTERNAL MEDICINE

## 2024-03-28 PROCEDURE — 84134 ASSAY OF PREALBUMIN: CPT

## 2024-03-28 PROCEDURE — 700101 HCHG RX REV CODE 250: Performed by: INTERNAL MEDICINE

## 2024-03-28 PROCEDURE — 86140 C-REACTIVE PROTEIN: CPT

## 2024-03-28 PROCEDURE — 700111 HCHG RX REV CODE 636 W/ 250 OVERRIDE (IP): Mod: JZ | Performed by: INTERNAL MEDICINE

## 2024-03-28 RX ORDER — ISOSORBIDE DINITRATE 10 MG/1
10 TABLET ORAL 3 TIMES DAILY
Status: DISCONTINUED | OUTPATIENT
Start: 2024-03-28 | End: 2024-03-29

## 2024-03-28 RX ORDER — NYSTATIN 100000 [USP'U]/G
POWDER TOPICAL 2 TIMES DAILY
Status: DISCONTINUED | OUTPATIENT
Start: 2024-03-28 | End: 2024-04-03

## 2024-03-28 RX ADMIN — CEFTRIAXONE SODIUM 2000 MG: 10 INJECTION, POWDER, FOR SOLUTION INTRAVENOUS at 06:04

## 2024-03-28 RX ADMIN — HYDRALAZINE HYDROCHLORIDE 20 MG: 20 INJECTION, SOLUTION INTRAMUSCULAR; INTRAVENOUS at 15:07

## 2024-03-28 RX ADMIN — SODIUM CHLORIDE 5 MG/HR: 9 INJECTION, SOLUTION INTRAVENOUS at 02:35

## 2024-03-28 RX ADMIN — SODIUM CHLORIDE 5 MG/HR: 9 INJECTION, SOLUTION INTRAVENOUS at 07:44

## 2024-03-28 RX ADMIN — NYSTATIN: 100000 POWDER TOPICAL at 17:51

## 2024-03-28 RX ADMIN — LABETALOL HYDROCHLORIDE 10 MG: 5 INJECTION INTRAVENOUS at 17:33

## 2024-03-28 RX ADMIN — SODIUM CHLORIDE 15 MG/HR: 9 INJECTION, SOLUTION INTRAVENOUS at 17:42

## 2024-03-28 RX ADMIN — SODIUM CHLORIDE 15 MG/HR: 9 INJECTION, SOLUTION INTRAVENOUS at 00:10

## 2024-03-28 RX ADMIN — HYDRALAZINE HYDROCHLORIDE 20 MG: 20 INJECTION, SOLUTION INTRAMUSCULAR; INTRAVENOUS at 10:26

## 2024-03-28 RX ADMIN — SODIUM CHLORIDE 15 MG/HR: 9 INJECTION, SOLUTION INTRAVENOUS at 19:45

## 2024-03-28 RX ADMIN — SODIUM CHLORIDE 15 MG/HR: 9 INJECTION, SOLUTION INTRAVENOUS at 16:02

## 2024-03-28 RX ADMIN — HYDRALAZINE HYDROCHLORIDE 20 MG: 20 INJECTION, SOLUTION INTRAMUSCULAR; INTRAVENOUS at 18:13

## 2024-03-28 RX ADMIN — NYSTATIN: 100000 POWDER TOPICAL at 10:24

## 2024-03-28 RX ADMIN — DEXMEDETOMIDINE HYDROCHLORIDE 0.3 MCG/KG/HR: 100 INJECTION, SOLUTION INTRAVENOUS at 12:29

## 2024-03-28 RX ADMIN — DEXMEDETOMIDINE HYDROCHLORIDE 1 MCG/KG/HR: 100 INJECTION, SOLUTION INTRAVENOUS at 02:19

## 2024-03-28 RX ADMIN — SODIUM CHLORIDE 15 MG/HR: 9 INJECTION, SOLUTION INTRAVENOUS at 14:26

## 2024-03-28 RX ADMIN — SODIUM CHLORIDE 12.5 MG/HR: 9 INJECTION, SOLUTION INTRAVENOUS at 10:18

## 2024-03-28 RX ADMIN — ENOXAPARIN SODIUM 40 MG: 100 INJECTION SUBCUTANEOUS at 17:51

## 2024-03-28 RX ADMIN — SODIUM CHLORIDE 15 MG/HR: 9 INJECTION, SOLUTION INTRAVENOUS at 12:27

## 2024-03-28 RX ADMIN — SODIUM CHLORIDE 15 MG/HR: 9 INJECTION, SOLUTION INTRAVENOUS at 23:35

## 2024-03-28 RX ADMIN — SODIUM CHLORIDE 15 MG/HR: 9 INJECTION, SOLUTION INTRAVENOUS at 21:40

## 2024-03-28 RX ADMIN — LABETALOL HYDROCHLORIDE 10 MG: 5 INJECTION INTRAVENOUS at 21:40

## 2024-03-28 ASSESSMENT — PAIN DESCRIPTION - PAIN TYPE
TYPE: ACUTE PAIN

## 2024-03-28 ASSESSMENT — FIBROSIS 4 INDEX: FIB4 SCORE: 1.563471919941143185

## 2024-03-28 NOTE — PROGRESS NOTES
4 Eyes Skin Assessment Completed by RIGOBERTO Bowden and RIGOBERTO Matamoros.    Head WDL  Ears Redness, laceration sutured  Nose WDL  Mouth WDL  Neck Redness  Breast/Chest Redness  Shoulder Blades Redness  Spine WDL  (R) Arm/Elbow/Hand Redness, Blanching, Bruising, and Swelling, Edema, Blisters  (L) Arm/Elbow/Hand Redness, Blanching, Bruising, and Edema  Abdomen WDL  Groin Excoriation  Scrotum/Coccyx/Buttocks Redness and Blanching  (R) Leg Redness  (L) Leg Redness and Scar  (R) Heel/Foot/Toe Redness, Blanching, and Discoloration  (L) Heel/Foot/Toe Redness, Blanching, and Discoloration          Devices In Places ECG, Blood Pressure Cuff, Pulse Ox, SCD's, and Condom Cath      Interventions In Place Sacral Mepilex, TAP System, Pillows, Elbow Mepilex, and Low Air Loss Mattress    Possible Skin Injury No    Pictures Uploaded Into Epic Yes  Wound Consult Placed N/A  RN Wound Prevention Protocol Ordered No

## 2024-03-28 NOTE — DISCHARGE PLANNING
Care Transition Team Assessment    Admission Date: 3/24/2024  GMLOS: 3  ALOS: 4    6-Clicks ADL Score:    6-Clicks Mobility Score:        Anticipated Discharge Dispo: Discharge Disposition: Disch to IP rehab facility or distinct part unit (62)    DME Needed: No    Action(s) Taken:     Pt discussed during IDT rounds. Pt is currently confused, on bilat restraints, cardene/precdex drip. Plan is to insert IRIS for enteral access to start tube feeds.     RN CM spoke with daughter Cheyenne [phone] to obtain information. Per daughter, she hasn't spoken to her dad since a year ago and can only provide info on what she knew then. Pt currently lives in [facility] by himself in [place]. Daughter stated she is not sure pt has AD. Daughter states pt is not employed and is on “disability for his back.” Prior to hospitalization, daughter stated pt was independent with ADLs and IADLs. States owns a cane. Tobacco use smoker.     PT/OT/SLP pending eval/recommendations  PMR order placed     Escalations Completed: None    Medically Clear: No    Next Steps: CM to assist with discharge, awaiting     Barriers to Discharge: Medical clearance, neuro status    Information Source  Orientation Level: Unable to assess  Information Given By: Other (Comments) (daughter)  Informant's Name: Cheyenne Barlow  Who is responsible for making decisions for patient? : Legal next of kin    Readmission Evaluation  Is this a readmission?: No    Elopement Risk  Legal Hold: No  Ambulatory or Self Mobile in Wheelchair: No-Not an Elopement Risk  Elopement Risk: Not at Risk for Elopement    Discharge Preparedness  What is your plan after discharge?: Uncertain - pending medical team collaboration  What are your discharge supports?: Other (comment) (daughter)  Prior Functional Level: Independent with Activities of Daily Living    Functional Assesment  Prior Functional Level: Independent with Activities of Daily Living    Finances  Financial Barriers to  Discharge: Yes    Discharge Risks or Barriers  Discharge risks or barriers?: Complex medical needs  Patient risk factors: Complex medical needs    Anticipated Discharge Information  Discharge Disposition: Disch to IP rehab facility or distinct part unit (62)

## 2024-03-28 NOTE — WOUND TEAM
[facility]  Inpatient Services  Initial Wound and Skin Care Evaluation    Admission Date: 3/24/2024     Last order of IP CONSULT TO WOUND CARE was found on 3/25/2024 from Hospital Encounter on 3/24/2024     HPI, PMH, SH: Reviewed    Past Surgical History:   Procedure Laterality Date    MA SPLIT GRFT,HEAD,FAC,HAND,FEET <100SQCM Left 9/14/2022    Procedure: LEFT THIGH TO LEG SPLIT THICKNESS SKIN GRAFT;  Surgeon: Taqueria Guerrier M.D.;  Location: [facility];  Service: Orthopedics    LAMINOTOMY  1990    L4-5-S1 fusion      Social History     Tobacco Use    Smoking status: Every Day     Current packs/day: 0.20     Average packs/day: 0.2 packs/day for 16.0 years (3.2 ttl pk-yrs)     Types: Cigarettes    Smokeless tobacco: Never    Tobacco comments:     smokes 5-6 cigs/day    Substance Use Topics    Alcohol use: No     Chief Complaint   Patient presents with    ALOC     Patient BIB EMS from home. Patient A/O x2 Person and place. Unknown last known well or baseline.     Head Injury     Blood on right side of head unknown last know well or time down at home. Patient found on ground by neighbor.     Hypertension     BP /150 given 5mg metoprolol IV by EMS per ERP orders.       Diagnosis: Hypertensive crisis [I16.9]    Unit where seen by Wound Team: [id number]     WOUND CONSULT RELATED TO: Sacrum, bilateral feet, groin, R ear    WOUND TEAM PLAN OF CARE - Frequency of Follow-up:   Nursing to follow dressing orders written for wound care. Contact wound team if area fails to progress, deteriorates or with any questions/concerns if something comes up before next scheduled follow up (See below as to whether wound is following and frequency of wound follow up)   Not following, consult as needed  - Sacrum, bilateral feet, groin, R ear    WOUND HISTORY:  The patient is a 63 y.o.  male with a past medical history of HTN, HLD, history of lumbar fusion, and history of tobacco use;  who  presented on 3/24/2024 10:37 AM with AMS after being found down by his neighbor.         WOUND ASSESSMENT/LDA  Wound 03/24/24 Ear Right Ear cut that MD sutured in ED (Active)   Date First Assessed/Time First Assessed: 03/24/24 1400   Present on Original Admission: Yes  Location: Ear  Laterality: Right  Wound Description (Comments): Ear cut that MD sutured in ED  Number of Sutures Placed: 5      Assessments 3/28/2024 10:15 AM   Wound Image     Site Assessment Red   Periwound Assessment Clean;Dry;Intact;Red   Margins Attached edges;Defined edges   Closure Sutures   Drainage Amount None   Treatments Cleansed;Nonselective debridement;Site care   Wound Cleansing Foam Cleanser/Washcloth   Periwound Protectant Not Applicable   Dressing Status Open to Air       Wound 03/24/24 Buttocks Right POA DTI (Active)   Date First Assessed/Time First Assessed: 03/24/24 1400   Present on Original Admission: Yes  Location: Buttocks  Laterality: Right  Wound Description (Comments): POA DTI      Assessments 3/28/2024 10:15 AM   Wound Image      Site Assessment Purple   Periwound Assessment Clean;Dry;Intact   Margins Attached edges;Defined edges   Closure Open to air   Drainage Amount None   Treatments Cleansed;Nonselective debridement;Site care   Wound Cleansing Foam Cleanser/Washcloth   Periwound Protectant Barrier Paste   Dressing Status Open to Air   Wound Length (cm) 1.3 cm   Wound Width (cm) 1.5 cm   Wound Surface Area (cm^2) 1.95 cm^2       Wound 03/24/24 Penis;Groin (Active)   Date First Assessed/Time First Assessed: 03/24/24 1400   Present on Original Admission: Yes  Location: Penis;Groin      Assessments 3/28/2024 10:15 AM   Wound Image      Site Assessment Red   Periwound Assessment Clean;Dry;Intact;Pink   Margins Attached edges;Defined edges   Closure Open to air   Treatments Cleansed;Nonselective debridement;Site care   Periwound Protectant Antifungal Therapy   Dressing Status Open to Air   Wound Team Following Not following        Wound 03/25/24 Pressure Injury Toe, 2nd Anterior Right Non blanching spots on end of toe POA (Active)   Date First Assessed/Time First Assessed: 03/25/24 1609   Present on Original Admission: Yes  Hand Hygiene Completed: Yes  Primary Wound Type: Pressure Injury  Location: Toe, 2nd  Wound Orientation: Anterior  Laterality: Right  Wound Description (Comme...      Assessments 3/28/2024 10:15 AM   Wound Image     Site Assessment Light Purple;Red   Periwound Assessment Clean;Dry;Intact   Margins Attached edges;Defined edges   Closure Open to air   Drainage Amount None   Dressing Status Open to Air        Vascular:    SHEILA:   No results found.    Lab Values:    Lab Results   Component Value Date/Time    WBC 8.1 03/28/2024 03:30 AM    RBC 4.48 (L) 03/28/2024 03:30 AM    HEMOGLOBIN 13.4 (L) 03/28/2024 03:30 AM    HEMATOCRIT 41.1 (L) 03/28/2024 03:30 AM    CREACTPROT 1.35 (H) 03/28/2024 03:30 AM    HBA1C 6.0 (H) 03/28/2024 03:30 AM         Culture Results show:  No results found for this or any previous visit (from the past 720 hour(s)).    Pain Level/Medicated:  None, Tolerated without pain medication       INTERVENTIONS BY WOUND TEAM:  Chart and images reviewed. Discussed with bedside RN. All areas of concern (based on picture review, LDA review and discussion with bedside RN) have been thoroughly assessed. Documentation of areas based on significant findings. This RN in to assess patient. Performed standard wound care which includes appropriate positioning, dressing removal and non-selective debridement. Pictures and measurements obtained weekly if/when required.    Wound: L buttocks  Cleansed/Non-selectively Debrided with:  No rinse foam soap and Moist warm washcloth  Sari wound: Cleansed with No rinse foam soap and Moist warm washcloth, Prepped with Barrier paste  Primary Dressing:  Barrier paste  Secondary (Outer) Dressing: Sacral mepilex      Wound:  Bilat toes/R hallux, R 2nd toe  Cleansed/Non-selectively  Debrided with:  No rinse foam soap and Moist warm washcloth  Sari wound: Cleansed with No rinse foam soap and Moist warm washcloth, Prepped with Open to Air  Primary Dressing:  open to air    Advanced Wound Care Discharge Planning  Number of Clinicians necessary to complete wound care: 1  Is patient requiring IV pain medications for dressing changes:  No   Length of time for dressing change 30 min. (This does not include chart review, pre-medication time, set up, clean up or time spent charting.)    Interdisciplinary consultation: Patient, Bedside RN Ernie), N/A.  Pressure injury and staging reviewed with N/A.    EVALUATION / RATIONALE FOR TREATMENT:     Date:  03/28/24  Wound Status:  Initial evaluation    L buttocks:  L buttocks with purple, non-blanching area that could be bruising or evolving DTI POA. Q2 turns and offloading to be continued.     Bilat Toes/R hallux & 2nd toe: Non-blanching areas on toes that are red in color and intact. This could also be bruising, it does not have the normal appearance of a pressure injury, however, it could also be an evolving DTI POA. Several nono-blanching areas smaller than 1cm are scattered throughout the R hallux, and present on bilateral 2nd toes. Continue to offload and monitor area for evolving DTI.    The patient's groin is pink and moist, an antifungal powder used to reduce moisture in area. Bilateral heels are red, but all areas are blanching.      L heel     R heel       Goals: Steady decrease in wound area and depth weekly.    NURSING PLAN OF CARE ORDERS:  Skin care: See Skin Care orders    NUTRITION RECOMMENDATIONS   Wound Team Recommendations:  N/A    DIET ORDERS (From admission to next 24h)       Start     Ordered    03/27/24 1750  Diet: Diet Tube Feed; Formula: Jevity; Goal Rate (mL/Hour): 25; Jevity: 1.2 RTH  ALL MEALS        Comments: Start at 25mL/hr. Do not advance until dietitian consult completed.   Question Answer Comment   Diet Diet Tube Feed     Formula: Jevity    Goal Rate (mL/Hour) 25    Jevity: 1.2 RTH        03/27/24 1749    03/26/24 0937  Diet Order Diet: Level 5 - Minced and Moist; Liquid level: Level 0 - Thin  ALL MEALS        Question Answer Comment   Diet: Level 5 - Minced and Moist    Liquid level Level 0 - Thin        03/26/24 0937                    PREVENTATIVE INTERVENTIONS:    Q shift Shadi - performed per nursing policy  Q shift pressure point assessments - performed per nursing policy    Surface/Positioning  ICU Low Airloss - Currently in Place  Reposition q 2 hours - Currently in Place  TAPs Turning system - Currently in Place    Offloading/Redistribution  Sacral offloading dressing (Silicone dressing) - Currently in Place  Heel offloading dressing (Silicone dressing) - Applied this Visit      Respiratory  Mask - Currently in Place    Containment/Moisture Prevention    Dri-zay pad - Currently in Place  Purwick/Condom Cath - Currently in Place  Barrier paste - Currently in Place    Anticipated discharge plans:  N/A        Vac Discharge Needs:  Vac Discharge plan is purely a recommendation from wound team and not a requirement for discharge unless otherwise stated by physician.  Not Applicable Pt not on a wound vac

## 2024-03-28 NOTE — PROGRESS NOTES
Cardiology Follow Up Progress Note    Date of Service  3/28/2024    Attending Physician  CAMILLE Reyes*    Chief Complaint   Altered level of consciousness    ILEANA Sanchez is a 64 y.o. male admitted 3/24/2024 with hypertension, dyslipidemia, muscle disorder presents with altered level consciousness after being found down with a disheveled appearance.    Interim Events  Patient is largely noninteractive when asked questions however will track to voice command with his eyes and his head.  He is being followed closely by the neurology service and the hospital medicine team.  No acute events per nursing staff.    Review of Systems  Review of Systems    Vital signs in last 24 hours  Pulse:  [54-89] 79  Resp:  [13-32] 19  BP: (109-173)/(54-88) 143/66  SpO2:  [88 %-98 %] 97 %    Physical Exam  Physical Exam  Constitutional:       Appearance: He is ill-appearing.   HENT:      Head: Normocephalic and atraumatic.      Mouth/Throat:      Mouth: Mucous membranes are moist.      Pharynx: Oropharynx is clear.   Eyes:      Extraocular Movements: Extraocular movements intact.      Conjunctiva/sclera: Conjunctivae normal.   Cardiovascular:      Rate and Rhythm: Normal rate and regular rhythm.      Pulses: Normal pulses.      Heart sounds: Normal heart sounds. No murmur heard.     No friction rub. No gallop.   Pulmonary:      Effort: Pulmonary effort is normal.      Breath sounds: Normal breath sounds.   Abdominal:      General: Bowel sounds are normal.      Palpations: Abdomen is soft.   Musculoskeletal:         General: Normal range of motion.      Cervical back: Normal range of motion and neck supple.      Right lower leg: No edema.      Left lower leg: No edema.   Skin:     General: Skin is warm and dry.   Neurological:      General: No focal deficit present.      Mental Status: He is oriented to person, place, and time. Mental status is at baseline.         Lab Review  Lab Results   Component Value  "Date/Time    WBC 8.1 03/28/2024 03:30 AM    RBC 4.48 (L) 03/28/2024 03:30 AM    HEMOGLOBIN 13.4 (L) 03/28/2024 03:30 AM    HEMATOCRIT 41.1 (L) 03/28/2024 03:30 AM    MCV 91.7 03/28/2024 03:30 AM    MCH 29.9 03/28/2024 03:30 AM    MCHC 32.6 03/28/2024 03:30 AM    MPV 10.4 03/28/2024 03:30 AM      Lab Results   Component Value Date/Time    SODIUM 145 03/28/2024 03:30 AM    POTASSIUM 3.6 03/28/2024 03:30 AM    CHLORIDE 113 (H) 03/28/2024 03:30 AM    CO2 17 (L) 03/28/2024 03:30 AM    GLUCOSE 90 03/28/2024 03:30 AM    BUN 20 03/28/2024 03:30 AM    CREATININE 1.63 (H) 03/28/2024 03:30 AM    BUNCREATRAT 20.0 09/07/2022 08:25 AM    BUNCREATRAT 20 07/12/2016 07:20 AM      Lab Results   Component Value Date/Time    ASTSGOT 25 03/28/2024 03:30 AM    ALTSGPT 22 03/28/2024 03:30 AM     Lab Results   Component Value Date/Time    CHOLSTRLTOT 148 03/28/2024 03:30 AM    LDL 95 03/28/2024 03:30 AM    HDL 41 03/28/2024 03:30 AM    TRIGLYCERIDE 59 03/28/2024 03:30 AM    TROPONINT 63 (H) 03/25/2024 04:48 PM       No results for input(s): \"NTPROBNP\" in the last 72 hours.        Assessment/Plan  1.  Altered level of consciousness with unclear etiology suspected multiple cerebral microinfarcts  2.  Hypertensive emergency  3.  Newly diagnosed heart failure with reduced ejection fraction  4.  Positive methamphetamine  5.  Longstanding hypertension with multiple episodes of hypertensive urgency/emergency  6.  Acute kidney injury    Clinically, he is doing very poorly and appears to be in critical condition with waning response to overall vocal command.  He does have newly diagnosed heart failure with reduced ejection fraction which I suspect is multifactorial stemming from his hypertensive emergency coupled to positive methamphetamine use.  At this time he is still hypertensive and there is some mild use of permissive hypertension at this time.  We will steadily add in appropriate heart failure medications in which Isordil was added to his " regimen that already has hydralazine in place.  Based on his overall response to Isordil therapy it would be reasonable to start adding beta-blocker therapy.  I have a very low clinical suspicion for an endocarditis source given that he had likely a contaminant in one of his blood cultures.  It appeared at the time at bedside he was to undergo an EEG to assess overall brain function being followed closely by the neurology service.  At this time we will be very conservative adding goal-directed medical therapy to minimize hypotensive episodes that may exacerbate cerebral perfusion.  It is advised that he abstain from further methamphetamine exposure given the strong correlation with toxic cardiomyopathy    Critical care time: 35 minutes was utilized in direct face-to-face patient care, document preparation, document review, care coordination with his multi disciplinary team.  Discussion with the nursing staff      Thank you for allowing me to participate in the care of this patient.  I will continue to follow this patient    Please contact me with any questions.    Jesus Nazario D.O.   Cardiologist, [facility]  [phone]

## 2024-03-28 NOTE — THERAPY
Physical Therapy Contact Note    Discussed with RN who reported patient not responding to pain and with limited arousal; patient not appropriate for PT today. Will re attempt PT evaluation as able and appropriate.     Daksha Serna, PT, DPT  [phone]

## 2024-03-28 NOTE — PROGRESS NOTES
Iris was placed, waiting for placement results at this time prior to medication administration. 10 mg isorbide dinitrate medication administration will be administered at 1200 (scheduled dose at 0745 was held, unable to administer since Iris was not in place) cardiology was updated.

## 2024-03-28 NOTE — CARE PLAN
The patient is Unstable - High likelihood or risk of patient condition declining or worsening    Shift Goals  Clinical Goals: hemodynamic stability, q4 neuro checks  Patient Goals: POLINA  Family Goals: POLINA    Progress made toward(s) clinical / shift goals:    Problem: Hemodynamic Monitoring  Goal: Patient's hemodynamics, fluid balance and neurologic status will be stable or improve  Note: Nicardipine drip is running at 15 mg/hr , BP stabilized, VS taken Q 15 min, pt on continuous cardiac monitoring. Daily labs drawn.        P  Problem: Safety - Medical Restraint  Goal: Remains free of injury from restraints (Restraint for Interference with Medical Device)  Outcome: Progressing  Flowsheets (Taken 3/28/2024 1658)  Addressed this shift: Remains free of injury from restraints (restraint for interference with medical device):   Evaluate the patient's condition at the time of restraint application   Determine that other, less restrictive measures have been tried or would not be effective before applying the restraint  Note: Pt RaSS score of -2, pt was not making unsafe attempts to get out of bed, pt was not trying to pull lines or remove equipment. Bilateral non-violent wrist restraints were removed at 0645  Goal: Free from restraint(s) (Restraint for Interference with Medical Device)  Outcome: Progressing  Note: Pt RaSS score of -2, pt was not making unsafe attempts to get out of bed, pt was not trying to pull lines or remove equipment. Bilateral non-violent wrist restraints were removed at 0645

## 2024-03-28 NOTE — CARE PLAN
The patient is Watcher - Medium risk of patient condition declining or worsening    Shift Goals  Clinical Goals: hemodynamic stability  Patient Goals: genesis  Family Goals: genesis    Progress made toward(s) clinical / shift goals:    Problem: Safety - Medical Restraint  Goal: Remains free of injury from restraints (Restraint for Interference with Medical Device)  Outcome: Progressing     Problem: Pain - Standard  Goal: Alleviation of pain or a reduction in pain to the patient’s comfort goal  Outcome: Progressing     Problem: Skin Integrity  Goal: Skin integrity is maintained or improved  Outcome: Progressing     Problem: Fall Risk  Goal: Patient will remain free from falls  Outcome: Progressing     Problem: Hemodynamics  Goal: Patient's hemodynamics, fluid balance and neurologic status will be stable or improve  Outcome: Progressing     Problem: Respiratory  Goal: Patient will achieve/maintain optimum respiratory ventilation and gas exchange  Outcome: Progressing       Patient is not progressing towards the following goals:      Problem: Safety - Medical Restraint  Goal: Free from restraint(s) (Restraint for Interference with Medical Device)  Outcome: Not Progressing

## 2024-03-28 NOTE — CARE PLAN
The patient is Watcher - Medium risk of patient condition declining or worsening    Shift Goals  Clinical Goals: hemodynamic stability, q4 neuro checks  Patient Goals: POLINA  Family Goals: POLINA    Progress made toward(s) clinical / shift goals:      Problem: Safety - Medical Restraint  Goal: Remains free of injury from restraints (Restraint for Interference with Medical Device)  Outcome: Progressing     Problem: Pain - Standard  Goal: Alleviation of pain or a reduction in pain to the patient’s comfort goal  Outcome: Progressing     Problem: Skin Integrity  Goal: Skin integrity is maintained or improved  Outcome: Progressing     Problem: Fall Risk  Goal: Patient will remain free from falls  Outcome: Progressing     Problem: Hemodynamics  Goal: Patient's hemodynamics, fluid balance and neurologic status will be stable or improve  Outcome: Progressing     Problem: Urinary Elimination  Goal: Establish and maintain regular urinary output  Outcome: Progressing       Patient is not progressing towards the following goals:      Problem: Knowledge Deficit - Standard  Goal: Patient and family/care givers will demonstrate understanding of plan of care, disease process/condition, diagnostic tests and medications  Outcome: Not Progressing     Problem: Safety - Medical Restraint  Goal: Free from restraint(s) (Restraint for Interference with Medical Device)  Outcome: Not Progressing     Problem: Psychosocial  Goal: Patient's level of anxiety will decrease  Outcome: Not Progressing  Goal: Patient's ability to verbalize feelings about condition will improve  Outcome: Not Progressing     Problem: Nutrition  Goal: Patient's nutritional and fluid intake will be adequate or improve  Outcome: Not Progressing     Problem: Psychosocial - Patient Condition  Goal: Patient's ability to verbalize feelings about condition will improve  Outcome: Not Progressing     Problem: Mobility - Stroke  Goal: Patient's capacity to carry out activities will  improve  Outcome: Not Progressing     Problem: Self Care  Goal: Patient will have the ability to perform ADLs independently or with assistance (bathe, groom, dress, toilet and feed)  Outcome: Not Progressing

## 2024-03-28 NOTE — PROGRESS NOTES
Iris Placement    Tube Team verified patient name and medical record number prior to tube placement. Iris feeding tube (55 inches, 10 Wallisian) placed at 70 cm in left nare. Per Iris picture, tube appears to be in the small bowel.    Nursing Instructions: Await KUB to confirm placement before use for medications or feeding. Stylet, may be removed, please place in labeled bag with insufflation bulb and save in patient medication drawer.
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
## 2024-03-28 NOTE — DIETARY
"Nutrition Support Assessment   Day 4 of admit.  Andrews Sanchez is a 64 y.o. male with admitting DX of Hypertensive crisis [I16.9]     Current problem list:    Hypertensive crisis (POA: Yes)    Essential hypertension (POA: Yes)    Positive urine drug screen (POA: Yes)    Acute hypoxic respiratory failure (HCC) (POA: Unknown)    HFrEF - estimated EF per Cards note = 25-30%    Elevated troponin (POA: Unknown)    Acute metabolic encephalopathy (POA: Unknown)    FRANKLYN (acute kidney injury) (HCC) (POA: Unknown)    Hypokalemia (POA: Unknown)    Hypophosphatemia (POA: Unknown)    Abnormal imaging of central nervous system (POA: Unknown)    Prolonged Q-T interval on ECG (POA: Unknown)    Bacteremia due to Gram-negative bacteria (POA: Unknown)    Debility (POA: Unknown)    Multiple lacunar infarcts (HCC) (POA: Unknown)     Assessment:  Estimated Nutritional Needs based on:   Height: 177.8 cm (5' 10\")  Weight: 103 kg (226 lb 6.6 oz) - BMI 32.49 kg/m², Obesity class I  Weight to Use in Calculations: 94.1 kg (207 lb 7.3 oz) (lowest admit wt, measured 3/27/24 = BMI 29.77 kg/m², overweight)  IBW = 75.5 kg (166 lb)    Calculation/Equation: MSJ = 1729 kcal, x1.0 - 1.2 = 1771-8703 kcal/day  Total Calories / day: 1789 - 1978  (Calories / k - 21)  Total Grams Protein / day: 113 - 141  (Grams Protein / k.2 - 1.5)  Protein per IBW: 113 - 136 grams daily (1.5 - 1.8 g/kg IBW)     Evaluation:   Indication for nutrition support: consult received for TF to start, not appropriate for swallow evaluation at this time and noted to be acutely encephalopathic, not responding to pain and w/ limited arousal per EMR. PO diet Level 5 - minced/moist, Level 0 - thin liquids currently in place; pt does not appear to be taking this at this time due to current mental status.  Enteral access: NGT in place, KUB pending to confirm placement  Labs: Cl 113, crea 1.63, GFR 46, Ca 8.4, alb 3.1, pre-alb 11.3, A1c 6.0%, CRP 1.35. Renal labs w/unfavorable " trend; will be more conservative w/ TF protein at this time. Baseline renal function unknown.  MAR: precedex, cardene gtt, Klyte  Skin: Seen by wound team this date: L buttock DTI vs bruising, bruising vs DTI to B/L oes/R hallux and 2nd toe; R ear cut sutured. No documented edema  +3L O2 via oxymask  Last BM: PTA  No chart wt hx available for review since 2018  Volume-restricted, moderate protein formula indicated for pt w/ dx HFrEF.      Malnutrition Risk: Unable to determine     Recommendations/Plan:  Once NGT confirmed by KUB, initiate TF Impact Peptid 1.5 (Pivot 1.5 is appropriate equivalent) at 20 mL/hr; advance by 15 mL q12hrs to final goal rate 50 mL/hr which will provide 1800 kcals, 112 grams protein, 924 mL free water in 24 hrs of infusion.  Additional fluids per MD.  Monitor weight trends.  Monitor renal labs.  PO diet as medically feasible/safe to be per SLP/MD.    RD following.

## 2024-03-28 NOTE — PROGRESS NOTES
4 Eyes Skin Assessment Completed by RIGOBERTO Garcia and RIGOBERTO Friedman.    Head WDL  Ears Redness  Nose WDL  Mouth Redness and dry  Neck Redness  Breast/Chest Redness  Shoulder Blades Redness  Spine Redness and Blanching  (R) Arm/Elbow/Hand Redness, Blanching, Bruising, Abrasion, and Swelling  (L) Arm/Elbow/Hand Redness, Blanching, Swelling, Discoloration, and Edema  Abdomen WDL  Groin Excoriation  Scrotum/Coccyx/Buttocks Redness and Blanching  (R) Leg Redness, Blanching, and Scar  (L) Leg Redness and Scar  (R) Heel/Foot/Toe Redness, Blanching, and Discoloration  (L) Heel/Foot/Toe Redness, Blanching, and Discoloration          Devices In Places ECG, Blood Pressure Cuff, Pulse Ox, SCD's, and Condom Cath      Interventions In Place Heel Mepilex, Sacral Mepilex, TAP System, Pillows, Elbow Mepilex, Low Air Loss Mattress, and Heels Loaded W/Pillows    Possible Skin Injury No    Pictures Uploaded Into Epic N/A  Wound Consult Placed N/A  RN Wound Prevention Protocol Ordered No

## 2024-03-28 NOTE — THERAPY
Occupational Therapy Contact Note    Patient Name: Andrews Sanchez  Age:  64 y.o., Sex:  male  Medical Record #: [id number]  Today's Date: 3/28/2024    OT consult received, spoke with PT who discussed with RN pt not responding to pain, lethargic not appropriate for therapy today. Will hold and follow up as able/appropriate for OT evaluation.      Jordi Guerra OTD, OTR/L

## 2024-03-28 NOTE — THERAPY
Speech Language Therapy Contact Note    Patient Name: Andrews Sanchez  Age:  64 y.o., Sex:  male  Medical Record #: [id number]  Today's Date: 3/28/2024       03/28/24 0987   Treatment Variance   Reason For Missed Therapy Medical - Patient Is Not Medically Stable   Initial Contact Note    Initial Contact Note  Order Received and Verified, Speech Therapy Evaluation in Progress with Full Report to Follow.   Interdisciplinary Plan of Care Collaboration   IDT Collaboration with  Nursing   Collaboration Comments Per RN, pt not appropriate for a swallow evaluation at this time and plan is to place an IRIS. SLP to follow as pt is appropriate to participate.

## 2024-03-28 NOTE — PROCEDURES
[facility]    Inpatient Standard Video Electroencephalogram Report      Patient Name: Andrews Sanchez  MRN: [id number]  #: [id number]  Date of Service: 03/28/24  Total Recording Time: 0 hours and 22 minutes.  Referring Provider: NILE Reyes    INDICATION:  Andrews Sanchez 64 y.o. male. This standard, inpatient, EEG was requested to evaluate for seizure(s).    CURRENT ANTI-SEIZURE AND OTHER PERTINENT MEDICATIONS:     Current Facility-Administered Medications:     isosorbide dinitrate    nystatin    potassium bicarbonate    Pharmacy    ondansetron    hydrALAZINE    aspirin **OR** aspirin    atorvastatin    labetalol    hydrALAZINE    niCARdipine (Cardene) 25 mg in  mL Standard Infusion    enoxaparin (LOVENOX) injection    ondansetron    dexmedetomidine (Precedex) infusion    TECHNIQUE: Standard inpatient video EEG was set up by a Neurodiagnostic technologist who performed education to the patient and staff. A minimum of 23 electrodes and 23 channel recording was setup and performed by Neurodiagnostic technologist, in accordance with the international 10-20 system. Impedence, electrode integrity, and technical impressions were documented. The study was reviewed in bipolar and referential montages. The recording examined the patient in the drowsy/sleep and/or comatose state(s).     DESCRIPTION OF THE RECORD:  EEG background: The background was continuous, symmetrical, and was without a definite posterior dominant rhythm, with a mixture of delta and theta . Reactivity was minimally present. Spontaneous variability was  was minimally present. State changes were were minimal/absent.    N2 sleep architecture was not seen.    ACTIVATION PROCEDURES:   Intermittent Photic stimulation was performed in a stepwise fashion from 1 to 30 Hz and did not elicited any abnormalities on EEG.     ICTAL AND INTERICTAL FINDINGS:   There were poorly formed bilateral, frontally dominant, with AP lag, triphasic  waveforms, at times occurring in periodic fashion at 1-1.5/sec.    No persistent regional slowing was seen during this routine study.      No electroclinical or electrographic seizures were reported or recorded during the study.     EKG: Sampling of the EKG recording showed tachycardia.    EVENTS:  No clinical events recorded or reported.    INTERPRETATION:  This was an abnormal video EEG recording in the drowsy/sleep and/or comatose state(s):  Diffuse slowing of the background, suggestive of diffuse and/or multifocal cerebral dysfunction.  This finding might be seen in a myriad of encephalopathies and in itself is a nonspecific finding.  There were poorly formed bilateral, frontally dominant, with AP lag, triphasic waveforms noted, at times occurring in periodic fashion at 1 to 1.5/s, which in itself might be suggestive of toxic-metabolic etiology of the above-noted encephalopathy, among other considerations. Clinical correlation is recommended.   No electrographic seizures were captured.  Single-lead EKG showed tachycardia-please correlate clinically.    Jose A Gonzalez MD  Neurology Attending, Epilepsy Program  [facility]       Suction Dilation and Curettage under Ultrasound Guidance.

## 2024-03-29 PROBLEM — K56.7 ILEUS (HCC): Status: ACTIVE | Noted: 2024-03-29

## 2024-03-29 PROBLEM — E87.0 HYPERNATREMIA: Status: ACTIVE | Noted: 2024-03-29

## 2024-03-29 LAB
ALBUMIN SERPL BCP-MCNC: 3.4 G/DL (ref 3.2–4.9)
ALBUMIN/GLOB SERPL: 1.3 G/DL
ALP SERPL-CCNC: 92 U/L (ref 30–99)
ALT SERPL-CCNC: 20 U/L (ref 2–50)
ANION GAP SERPL CALC-SCNC: 15 MMOL/L (ref 7–16)
ANION GAP SERPL CALC-SCNC: 15 MMOL/L (ref 7–16)
AST SERPL-CCNC: 23 U/L (ref 12–45)
BACTERIA BLD CULT: NORMAL
BILIRUB SERPL-MCNC: 0.7 MG/DL (ref 0.1–1.5)
BUN SERPL-MCNC: 24 MG/DL (ref 8–22)
BUN SERPL-MCNC: 27 MG/DL (ref 8–22)
CALCIUM ALBUM COR SERPL-MCNC: 9 MG/DL (ref 8.5–10.5)
CALCIUM SERPL-MCNC: 8.5 MG/DL (ref 8.5–10.5)
CALCIUM SERPL-MCNC: 8.6 MG/DL (ref 8.5–10.5)
CHLORIDE SERPL-SCNC: 115 MMOL/L (ref 96–112)
CHLORIDE SERPL-SCNC: 118 MMOL/L (ref 96–112)
CO2 SERPL-SCNC: 16 MMOL/L (ref 20–33)
CO2 SERPL-SCNC: 17 MMOL/L (ref 20–33)
CREAT SERPL-MCNC: 1.67 MG/DL (ref 0.5–1.4)
CREAT SERPL-MCNC: 1.79 MG/DL (ref 0.5–1.4)
CRP SERPL HS-MCNC: 1.51 MG/DL (ref 0–0.75)
ERYTHROCYTE [DISTWIDTH] IN BLOOD BY AUTOMATED COUNT: 54.5 FL (ref 35.9–50)
GFR SERPLBLD CREATININE-BSD FMLA CKD-EPI: 41 ML/MIN/1.73 M 2
GFR SERPLBLD CREATININE-BSD FMLA CKD-EPI: 45 ML/MIN/1.73 M 2
GLOBULIN SER CALC-MCNC: 2.6 G/DL (ref 1.9–3.5)
GLUCOSE SERPL-MCNC: 106 MG/DL (ref 65–99)
GLUCOSE SERPL-MCNC: 107 MG/DL (ref 65–99)
HCT VFR BLD AUTO: 41.9 % (ref 42–52)
HGB BLD-MCNC: 13.3 G/DL (ref 14–18)
MCH RBC QN AUTO: 29.6 PG (ref 27–33)
MCHC RBC AUTO-ENTMCNC: 31.7 G/DL (ref 32.3–36.5)
MCV RBC AUTO: 93.1 FL (ref 81.4–97.8)
PHOSPHATE SERPL-MCNC: 4 MG/DL (ref 2.5–4.5)
PLATELET # BLD AUTO: 259 K/UL (ref 164–446)
PMV BLD AUTO: 10.1 FL (ref 9–12.9)
POTASSIUM SERPL-SCNC: 3.6 MMOL/L (ref 3.6–5.5)
POTASSIUM SERPL-SCNC: 3.7 MMOL/L (ref 3.6–5.5)
PREALB SERPL-MCNC: 11.8 MG/DL (ref 18–38)
PROT SERPL-MCNC: 6 G/DL (ref 6–8.2)
RBC # BLD AUTO: 4.5 M/UL (ref 4.7–6.1)
SIGNIFICANT IND 70042: NORMAL
SITE SITE: NORMAL
SODIUM SERPL-SCNC: 147 MMOL/L (ref 135–145)
SODIUM SERPL-SCNC: 149 MMOL/L (ref 135–145)
SODIUM SERPL-SCNC: 150 MMOL/L (ref 135–145)
SOURCE SOURCE: NORMAL
WBC # BLD AUTO: 11.1 K/UL (ref 4.8–10.8)

## 2024-03-29 PROCEDURE — 700105 HCHG RX REV CODE 258: Performed by: INTERNAL MEDICINE

## 2024-03-29 PROCEDURE — 770000 HCHG ROOM/CARE - INTERMEDIATE ICU *

## 2024-03-29 PROCEDURE — 99291 CRITICAL CARE FIRST HOUR: CPT | Performed by: INTERNAL MEDICINE

## 2024-03-29 PROCEDURE — 86140 C-REACTIVE PROTEIN: CPT

## 2024-03-29 PROCEDURE — 700101 HCHG RX REV CODE 250: Performed by: INTERNAL MEDICINE

## 2024-03-29 PROCEDURE — A9270 NON-COVERED ITEM OR SERVICE: HCPCS | Performed by: INTERNAL MEDICINE

## 2024-03-29 PROCEDURE — 84100 ASSAY OF PHOSPHORUS: CPT

## 2024-03-29 PROCEDURE — 99233 SBSQ HOSP IP/OBS HIGH 50: CPT | Performed by: INTERNAL MEDICINE

## 2024-03-29 PROCEDURE — 84295 ASSAY OF SERUM SODIUM: CPT

## 2024-03-29 PROCEDURE — 700102 HCHG RX REV CODE 250 W/ 637 OVERRIDE(OP): Performed by: INTERNAL MEDICINE

## 2024-03-29 PROCEDURE — 80048 BASIC METABOLIC PNL TOTAL CA: CPT

## 2024-03-29 PROCEDURE — 80053 COMPREHEN METABOLIC PANEL: CPT

## 2024-03-29 PROCEDURE — 700111 HCHG RX REV CODE 636 W/ 250 OVERRIDE (IP): Mod: JZ | Performed by: INTERNAL MEDICINE

## 2024-03-29 PROCEDURE — 700102 HCHG RX REV CODE 250 W/ 637 OVERRIDE(OP): Performed by: STUDENT IN AN ORGANIZED HEALTH CARE EDUCATION/TRAINING PROGRAM

## 2024-03-29 PROCEDURE — 84134 ASSAY OF PREALBUMIN: CPT

## 2024-03-29 PROCEDURE — 700111 HCHG RX REV CODE 636 W/ 250 OVERRIDE (IP): Performed by: INTERNAL MEDICINE

## 2024-03-29 PROCEDURE — 85027 COMPLETE CBC AUTOMATED: CPT

## 2024-03-29 PROCEDURE — A9270 NON-COVERED ITEM OR SERVICE: HCPCS | Performed by: STUDENT IN AN ORGANIZED HEALTH CARE EDUCATION/TRAINING PROGRAM

## 2024-03-29 RX ORDER — HYDRALAZINE HYDROCHLORIDE 25 MG/1
25 TABLET, FILM COATED ORAL EVERY 8 HOURS
Status: DISCONTINUED | OUTPATIENT
Start: 2024-03-29 | End: 2024-03-29

## 2024-03-29 RX ORDER — ACETAMINOPHEN 325 MG/1
650 TABLET ORAL EVERY 4 HOURS PRN
Status: DISCONTINUED | OUTPATIENT
Start: 2024-03-29 | End: 2024-04-03

## 2024-03-29 RX ORDER — HYDRALAZINE HYDROCHLORIDE 50 MG/1
50 TABLET, FILM COATED ORAL EVERY 8 HOURS
Status: DISCONTINUED | OUTPATIENT
Start: 2024-03-30 | End: 2024-04-03

## 2024-03-29 RX ORDER — HYDRALAZINE HYDROCHLORIDE 20 MG/ML
20 INJECTION INTRAMUSCULAR; INTRAVENOUS EVERY 4 HOURS PRN
Status: DISCONTINUED | OUTPATIENT
Start: 2024-03-29 | End: 2024-04-03

## 2024-03-29 RX ORDER — AMOXICILLIN 250 MG
2 CAPSULE ORAL EVERY EVENING
Status: DISCONTINUED | OUTPATIENT
Start: 2024-03-29 | End: 2024-04-03

## 2024-03-29 RX ORDER — POLYETHYLENE GLYCOL 3350 17 G/17G
1 POWDER, FOR SOLUTION ORAL
Status: DISCONTINUED | OUTPATIENT
Start: 2024-03-29 | End: 2024-03-29

## 2024-03-29 RX ORDER — ISOSORBIDE DINITRATE 20 MG/1
20 TABLET ORAL EVERY 8 HOURS
Status: DISCONTINUED | OUTPATIENT
Start: 2024-03-29 | End: 2024-03-30

## 2024-03-29 RX ORDER — LABETALOL HYDROCHLORIDE 5 MG/ML
10 INJECTION, SOLUTION INTRAVENOUS EVERY 4 HOURS PRN
Status: DISCONTINUED | OUTPATIENT
Start: 2024-03-29 | End: 2024-04-03

## 2024-03-29 RX ORDER — AMOXICILLIN 250 MG
2 CAPSULE ORAL EVERY EVENING
Status: DISCONTINUED | OUTPATIENT
Start: 2024-03-29 | End: 2024-03-29

## 2024-03-29 RX ORDER — POLYETHYLENE GLYCOL 3350 17 G/17G
1 POWDER, FOR SOLUTION ORAL
Status: DISCONTINUED | OUTPATIENT
Start: 2024-03-29 | End: 2024-04-03

## 2024-03-29 RX ORDER — ISOSORBIDE DINITRATE 10 MG/1
10 TABLET ORAL EVERY 8 HOURS
Status: DISCONTINUED | OUTPATIENT
Start: 2024-03-29 | End: 2024-03-29

## 2024-03-29 RX ADMIN — LABETALOL HYDROCHLORIDE 10 MG: 5 INJECTION INTRAVENOUS at 21:40

## 2024-03-29 RX ADMIN — DEXMEDETOMIDINE HYDROCHLORIDE 0.1 MCG/KG/HR: 100 INJECTION, SOLUTION INTRAVENOUS at 18:34

## 2024-03-29 RX ADMIN — ISOSORBIDE DINITRATE 10 MG: 10 TABLET ORAL at 05:09

## 2024-03-29 RX ADMIN — ISOSORBIDE DINITRATE 20 MG: 20 TABLET ORAL at 23:24

## 2024-03-29 RX ADMIN — ISOSORBIDE DINITRATE 10 MG: 10 TABLET ORAL at 21:41

## 2024-03-29 RX ADMIN — ISOSORBIDE DINITRATE 10 MG: 10 TABLET ORAL at 18:22

## 2024-03-29 RX ADMIN — ATORVASTATIN CALCIUM 80 MG: 80 TABLET, FILM COATED ORAL at 18:22

## 2024-03-29 RX ADMIN — SODIUM CHLORIDE 10 MG/HR: 9 INJECTION, SOLUTION INTRAVENOUS at 08:04

## 2024-03-29 RX ADMIN — DOCUSATE SODIUM 50 MG AND SENNOSIDES 8.6 MG 2 TABLET: 8.6; 5 TABLET, FILM COATED ORAL at 18:33

## 2024-03-29 RX ADMIN — HYDRALAZINE HYDROCHLORIDE 25 MG: 25 TABLET ORAL at 18:22

## 2024-03-29 RX ADMIN — ASPIRIN 81 MG 81 MG: 81 TABLET ORAL at 05:09

## 2024-03-29 RX ADMIN — DEXMEDETOMIDINE HYDROCHLORIDE 0.7 MCG/KG/HR: 100 INJECTION, SOLUTION INTRAVENOUS at 02:37

## 2024-03-29 RX ADMIN — SODIUM CHLORIDE 15 MG/HR: 9 INJECTION, SOLUTION INTRAVENOUS at 05:24

## 2024-03-29 RX ADMIN — SODIUM CHLORIDE 15 MG/HR: 9 INJECTION, SOLUTION INTRAVENOUS at 01:29

## 2024-03-29 RX ADMIN — HYDRALAZINE HYDROCHLORIDE 20 MG: 20 INJECTION, SOLUTION INTRAMUSCULAR; INTRAVENOUS at 19:52

## 2024-03-29 RX ADMIN — HYDRALAZINE HYDROCHLORIDE 10 MG: 10 TABLET ORAL at 05:09

## 2024-03-29 RX ADMIN — NYSTATIN: 100000 POWDER TOPICAL at 05:09

## 2024-03-29 RX ADMIN — ENOXAPARIN SODIUM 40 MG: 100 INJECTION SUBCUTANEOUS at 17:37

## 2024-03-29 RX ADMIN — HYDRALAZINE HYDROCHLORIDE 25 MG: 25 TABLET ORAL at 21:40

## 2024-03-29 RX ADMIN — NYSTATIN: 100000 POWDER TOPICAL at 18:23

## 2024-03-29 RX ADMIN — SODIUM CHLORIDE 15 MG/HR: 9 INJECTION, SOLUTION INTRAVENOUS at 03:37

## 2024-03-29 ASSESSMENT — COGNITIVE AND FUNCTIONAL STATUS - GENERAL
WALKING IN HOSPITAL ROOM: TOTAL
PERSONAL GROOMING: TOTAL
DAILY ACTIVITIY SCORE: 6
TOILETING: TOTAL
MOVING TO AND FROM BED TO CHAIR: TOTAL
HELP NEEDED FOR BATHING: TOTAL
MOVING FROM LYING ON BACK TO SITTING ON SIDE OF FLAT BED: TOTAL
DRESSING REGULAR UPPER BODY CLOTHING: TOTAL
STANDING UP FROM CHAIR USING ARMS: TOTAL
DRESSING REGULAR LOWER BODY CLOTHING: TOTAL
CLIMB 3 TO 5 STEPS WITH RAILING: TOTAL
TURNING FROM BACK TO SIDE WHILE IN FLAT BAD: TOTAL
SUGGESTED CMS G CODE MODIFIER MOBILITY: CN
EATING MEALS: TOTAL
MOBILITY SCORE: 6
SUGGESTED CMS G CODE MODIFIER DAILY ACTIVITY: CN

## 2024-03-29 ASSESSMENT — PAIN DESCRIPTION - PAIN TYPE
TYPE: ACUTE PAIN

## 2024-03-29 ASSESSMENT — FIBROSIS 4 INDEX: FIB4 SCORE: 1.31

## 2024-03-29 NOTE — THERAPY
Physical Therapy Contact Note    Discussed with RN; patient still limited by impaired arousal, RN reported limited patient response to sternal rub. Will re attempt as able and appropriate.     Daksha Serna, PT, DPT  [phone]

## 2024-03-29 NOTE — DISCHARGE PLANNING
[facility] Transitional Care Coordination    Physiatry consult complete.  Dr. Brito recommending -     DISPO:  - patient is currently functioning below their level of baseline, recommend post acute rehab  - patient is not currently a candidate for IRF due to his current functional status, inability to participate with therapy thus far, need for prece dex drip, and likely lack of DC support   - anticipate that patient will need SNF level therapy for prolonged access to reahb when appropriate   - PM&R will follow peripherally for assistance with  encephalopathy     Volate update to ADALID Dubose.

## 2024-03-29 NOTE — PROGRESS NOTES
Hospital Medicine Daily Progress Note    Date of Service  3/29/2024    Chief Complaint  Andrews Sanchez is a 63 y.o. male admitted 3/24/2024 after he found down    Hospital Course    63 y.o. male with past medical history of hypertension hypercholesterolemia who presented 3/24/2024 after he found down for an unknown amount of 9 and he was covered in feces.  Patient found to have significant elevated blood pressure and as per the chart review the blood pressure was 226/143 on upon arrival to [facility].  He was diagnosed with hypertensive crisis, hypoxic respiratory failure, acute encephalopathy and acute kidney injury.  On imaging studies patient found to have 1.2 mm left frontal hyperdensity.  He underwent MRI brain that showed diffuse confluent FLAIR.  He was placed on Precedex gtt. for ongoing agitation and also he was placed on nicardipine infusion for uncontrolled hypertension.     On March 26, 2024 intensivist Dr. Carrera requested to transfer care to hospitalist service.    Interval Problem Update    03/27/24    I evaluated and examined him at the bedside.  Blood pressure still running on the higher side.  I added hydralazine.  Due to patient renal functions I did not ACE or ARB's.  He also found to have mild bradycardia and I did not add beta-blocker.  He still requiring Cardizem gtt.  I am continuing and titrating as per target blood pressure of 120-150.  He is still requiring Precedex gtt. for ongoing agitation.  I am continuing and titrating as per RASS of -1 to +1.  I requested consultation from cardiology for PAULINE as recommended by neurology to evaluate for infective endocarditis.  I discussed plan of care with neurologist Dr. Zapata.  I attempted to call patient's daughter but it was the wrong number.  I ordered CTA head and neck as recommended by neurology.  I evaluated multiple times throughout the day.  Plan is to get feeding tube for nutrition as well as for medication.    03/28/24    I have  noted and examined him at the bedside.  He remains critically ill and not following commands but  He underwent feeding tube placement.  I reviewed CT head and neck that did not show any acute normalities per  I called patient's daughter Cheyenne and I discussed plan of care with the and answered all her questions.  He is still requiring significant amount of nicardipine gtt. I am continuing and titrating nicardipine gtt. as per target systolic blood pressure of 120 to 150 mmHg.  I am also continuing titrating Precedex gtt. as per RASS of -1 to +1.  I personally discussed plan of care with neurologist Dr. Zapata.    03/29/24    I evaluated and examined him at the bedside.  He remains confused.  He attempted to pull his feeding tube.  He is in 2 point soft restraints.  Now blood pressure has significantly improved.  He is still on Precedex.  I am continuing and titrating Precedex as per RASS of -1 to +1.  He is still on a Cardizem gtt. plan is to wean him off from nicardipine and try to manage blood pressure with oral antihypertensive as now we have feeding tube.  Current sodium level is 147.  I started him on free water flushes from feeding tube.  I ordered repeat BMP for this afternoon.  I increase the dose of hydralazine.      I have discussed this patient's plan of care and discharge plan at IDT rounds today with Case Management, Nursing, Nursing leadership, and other members of the IDT team.    Consultants/Specialty  critical care    Code Status  Full Code    Disposition  The patient is not medically cleared for discharge to home or a post-acute facility.      I have placed the appropriate orders for post-discharge needs.    Review of Systems  Review of Systems   Unable to perform ROS: Mental acuity        Physical Exam  Temp:  [36 °C (96.8 °F)-37.3 °C (99.1 °F)] 36.9 °C (98.5 °F)  Pulse:  [60-93] 64  Resp:  [14-36] 16  BP: (109-165)/(54-88) 112/54  SpO2:  [94 %-98 %] 97 %    Physical Exam  Vitals reviewed.    Constitutional:       General: He is in acute distress.      Appearance: He is ill-appearing.      Comments: Soft restraints   HENT:      Head: Normocephalic and atraumatic. No contusion.      Right Ear: External ear normal.      Left Ear: External ear normal.      Nose: Nose normal.      Comments: Feeding tube     Mouth/Throat:      Pharynx: No oropharyngeal exudate.   Eyes:      General:         Right eye: No discharge.         Left eye: No discharge.      Pupils: Pupils are equal, round, and reactive to light.   Cardiovascular:      Rate and Rhythm: Normal rate and regular rhythm.      Heart sounds: No murmur heard.     No friction rub. No gallop.   Pulmonary:      Effort: Pulmonary effort is normal.      Breath sounds: No wheezing or rhonchi.   Abdominal:      General: Bowel sounds are normal. There is no distension.      Palpations: Abdomen is soft.      Tenderness: There is no abdominal tenderness. There is no rebound.   Musculoskeletal:         General: No swelling or tenderness. Normal range of motion.      Cervical back: No rigidity. No muscular tenderness.   Skin:     General: Skin is warm and dry.      Coloration: Skin is not jaundiced.   Neurological:      General: No focal deficit present.      Mental Status: He is alert. He is disoriented.      Cranial Nerves: No cranial nerve deficit.      Sensory: No sensory deficit.      Comments: Not following commands         Fluids    Intake/Output Summary (Last 24 hours) at 3/29/2024 0758  Last data filed at 3/29/2024 0630  Gross per 24 hour   Intake 3462.17 ml   Output 1150 ml   Net 2312.17 ml       Laboratory  Recent Labs     03/27/24  0450 03/28/24  0330 03/29/24  0320   WBC 9.4 8.1 11.1*   RBC 4.72 4.48* 4.50*   HEMOGLOBIN 14.2 13.4* 13.3*   HEMATOCRIT 42.8 41.1* 41.9*   MCV 90.7 91.7 93.1   MCH 30.1 29.9 29.6   MCHC 33.2 32.6 31.7*   RDW 50.3* 52.4* 54.5*   PLATELETCT 230 225 259   MPV 10.8 10.4 10.1     Recent Labs     03/27/24  1000 03/28/24  0330  03/29/24  0320   SODIUM 143 145 147*   POTASSIUM 3.7 3.6 3.6   CHLORIDE 109 113* 115*   CO2 21 17* 17*   GLUCOSE 87 90 107*   BUN 18 20 24*   CREATININE 1.51* 1.63* 1.67*   CALCIUM 8.7 8.4* 8.5               Recent Labs     03/28/24  0330   TRIGLYCERIDE 59   HDL 41   LDL 95       Imaging  DX-ABDOMEN FOR TUBE PLACEMENT   Final Result         1.  Air-filled distended loops of bowel are seen with reactive mucosal pattern in the upper abdomen, appearance suggests ileus or enteritis. Recommend radiographic followup to resolution to exclude progression to obstruction.   2.  Dobbhoff tube tip overlying the expected location of the pylorus or first duodenal segment.   3.  Cardiomegaly      CT-CTA NECK WITH & W/O-POST PROCESSING   Final Result      1.  No acute neck arterial abnormality detected. No significant arterial stenosis.      CT-CTA HEAD WITH & W/O-POST PROCESS   Final Result      1.  No acute arterial abnormality is detected.   2.  Severe white matter, brainstem and cerebellar hypoattenuation, indeterminate.   3.  2 mm density left frontal lobe suspicious for a small microhemorrhage.   4.  13 x 8 mm indeterminate focus of hypoattenuation in the right cerebellum.      MR-BRAIN-W/O   Final Result      1.  Diffuse confluent FLAIR hyperintense signal abnormality throughout the supratentorial white matter including posterior limbs internal capsules, subinsular white matter, as well as thalamic gray matter. Findings would be atypical and extreme for    chronic microvascular ischemic change. Toxic or metabolic leukoencephalopathy or consequence of other diffuse white matter insult including sequela of narcotic or other substance abuse might be considered. The extremely diffuse involvement would also be    very atypical for posterior reversible encephalopathy syndrome (PRES).   2.  There is also diffuse FLAIR signal involving the brainstem, middle cerebellar peduncles, and cerebellar white matter, presumably the same  etiology as the supratentorial process.   3.  Innumerable supratentorial and posterior fossa foci of microhemorrhage compatible with the given history of hypertension. One of these foci probably corresponds to the punctate focus of acute hemorrhagic density in the left frontal deep white matter    seen on the CT scan. This focus shows no significant surrounding vasogenic edema or mass effect.   4.  11 mm ovoid focus of FLAIR hyperintensity in the right cerebellar deep white matter likely representing encephalomalacia related to old infarct or other remote insult.   5.  Subcentimeter foci x3 of bright diffusion signal involving the left frontal deep white matter, left parietal deep white matter, and left occipital peritrigonal white matter respectively, consistent with acute lacunar size infarcts.      PN-VXBUFCH-1 VIEW   Final Result      1.  No evidence of bowel obstruction.   2.  Lumbar fusion hardware and metallic clips. No other metallic foreign body seen.   3.  Severe degenerative changes bilateral hip joints with likely AVN of the bilateral femoral heads.      EC-ECHOCARDIOGRAM COMPLETE W/O CONT   Final Result      CT-HEAD W/O   Final Result   Addendum (preliminary) 1 of 1   VOALTE message of critical findings sent to Dr. Huizar at 3/24/2024 11:37    AM. I also received confirmation of receipt of VOALTE message back from    the provider.      Final      1.  2 mm focus of hyperdensity in the left frontal white matter. This could be a small hemorrhage versus other etiology as above.   2.  Advanced confluent nonspecific white matter hypoattenuation which is markedly abnormal. Recommend follow-up with brain MRI study.      Based solely on CT findings, the brain injury guideline category is mBIG 1.      SDH < 4mm   IPH < 4mm   SAH < 3 sulci and < 1mm      The original BIG retrospective analysis found radiographic progression in 0% of BIG 1 patients and 2.6% BIG 2.      Efforts are underway to contact referring  physician with results at time of dictation.      DX-CHEST-PORTABLE (1 VIEW)   Final Result         Hazy bibasilar opacities, left more than right, atelectasis or infection.           Assessment/Plan  * Hypertensive crisis- (present on admission)  Assessment & Plan  3/29/2024   History of HTN, not on home meds per chart review  UDS positive for meth, possible etiology  I am continuing and titrating nicardipine gtt. plan is to slowly decrease blood pressure, target SBP around 120-150.  Plan is to manage blood pressure with p.o. medications and wean him off from nicardipine gtt.  I added hydralazine p.o. to control blood pressure on p.o. meds so we can wean him off from nicardipine gtt.  Now he is on p.o. hydralazine and nitro.  Continue to monitor closely in IMCU.    Hypernatremia  Assessment & Plan  Patient found to have hypernatremia.  I started him on free water flushes.  Repeat BMP at 1 PM.    Ileus (HCC)  Assessment & Plan  X-ray abdomen for tube placement showed air-filled distended loops of bowel are seen with reactive mucosal pattern in the upper abdomen, appearance suggest ileus or enteritis.    Multiple lacunar infarcts (HCC)  Assessment & Plan  3/29/2024   MRI showed acute lacunar size infarcts.  Patient will need Zio patch at the time of discharge.  Ordered HbA1c and lipid profile.  I started him on aspirin and atorvastatin.  I reviewed CT head and neck that did not show any acute abnormalities.  After discussing it with neurology I started him on DVT prophylaxis with Lovenox.  I ordered non-tPA stroke order set.  I updated patient's daughter regarding these findings.    Debility  Assessment & Plan  PT/OT consult requested    Bacteremia due to Gram-negative bacteria  Assessment & Plan  1/2 BC + for GNR  [facility] and 12/2022 for left lower extremity abscess that grew enterobacter, enterococcus, deteriorates   No overt murmurs and TTE was unrevealing  Patient has hardware  in his back but no back pain on physical exam  Unclear source  Culture came back as a contaminant and I discontinued antibiotic to avoid adverse effects.      Prolonged Q-T interval on ECG  Assessment & Plan  3/29/2024   QTc 503  Continue to monitor colitis and replace as needed.  Avoid QT prolonging agents as able  Continue to monitor closely on telemetry.    Abnormal imaging of central nervous system  Assessment & Plan  CT head w/o contrast revealing 2mm focus hyperdensity in left frontal white matter; vascular neurology evaluated patient in ED and think this is an incidental finding and not likely intraparenchymal hemorrhage  MRI completed on 3/25, multiple findings,   Neurology evaluated him and made recommendations.  Continue to monitor closely on telemetry.  Continue to monitor blood pressure.  I reviewed finding of EEG.    Hypophosphatemia  Assessment & Plan  3/29/2024   Continue to monitor and replace as needed.    Hypokalemia  Assessment & Plan  3/29/2024   Continue to monitor and replace as needed.    FRANKLYN (acute kidney injury) (HCC)  Assessment & Plan  3/29/2024   Unclear baseline, possibly 1.0-1.1 however last stable Cr from 2022  UA relatively bland, protein loss noted  Limit nephrotoxins and renally dose as appropriate  Renal functions remain similar.  Ordered repeat lab workup for tomorrow morning.    Acute metabolic encephalopathy  Assessment & Plan  Unclear etiology, possibly multifactorial in setting of meth use, Wernicke's (history of alcohol use, unclear if current), PRES, acute hypoxic respiratory failure  CT head revealed 2mm focus hyperdensity (vascular neurology evaluated patient in ED and think finding is incidental rather than intraparenchymal hemorrhage)  I reviewed finding of MRI brain.    I discussed plan of care with neurologist.  Due to MRI finding neurologist recommended Zio patch at the time of discharge.  Started on empiric IV thiamine   Continue to monitor closely.  He remains  "significantly agitated.  I am continuing and titrating Precedex as per RASS of -1 to +1.  I ordered EEG after discussing it with neurology.  I reviewed EEG that showed \"Diffuse slowing of the background, suggestive of diffuse and/or multifocal cerebral dysfunction. This finding might be seen in a myriad of encephalopathies and in itself is a nonspecific finding.\"      Elevated troponin  Assessment & Plan  Possibly related to underlying demand ischemia with decreased renal clearance   Troponins trended  Continue monitor closely on telemetry      HFrEF (heart failure with reduced ejection fraction) ([facility])  Assessment & Plan  3/29/2024   No known history of heart failure, possibly meth induced  Echo (3/24): Per report, mild concentric LVH, EF estimated to be 25-30%   Likely initiation of GDMT once patient  is more stable.  Cardiology has been following him.    Acute hypoxic respiratory failure (HCC)  Assessment & Plan  Unclear if the patient is on oxygen at home, no known history of COPD  Bibasilar opacities L > R, empirically started on Unasyn in the ED but later discontinued as etiology preferred to be pulmonary edema, overall decreasing O2 requirements  Continue monitor closely in IMCU.    Positive urine drug screen- (present on admission)  Assessment & Plan  UDS positive for amphetamines, this problem dates back many years with prior positive urine drug screens.    Counseling when appropriate.    Essential hypertension- (present on admission)  Assessment & Plan  Continue p.o. hydralazine and cardiology also started him on nitro.  Now patient has feeding tube so we can use enteral access for blood pressure medications.  I am holding ACE and ARB's due to renal functions and he has mild bradycardia I did not start him on beta-blocker.  I am continuing titrating nicardipine gtt as per target systolic blood pressure of 120-150.  Continue p.o. meds and uptitration so we can wean him off from nicardipine gtt.  Continue to " monitor closely.      I discussed plan of care during multidisciplinary rounds regarding patient's current medical condition and plan of care.    Patient is critically ill.   The patient continues to have: Agitation and uncontrolled hypertension  The vital organ system that is affected is the: Central nervous system and cardiovascular system.  If untreated there is a high chance of deterioration into: Cardiovascular collapse  And eventually death.   The critical care that I am providing today is: I am continuing and titrating nicardipine gtt. as per target systolic blood pressure.  I am continuing and titrating Precedex gtt. as per RASS of -1 to +1 as patient has ongoing agitation.  The critical that has been undertaken is medically complex.   There has been no overlap in critical care time.   Critical Care Time not including procedures: 41 minutes           I discussed with neurology regarding pharmacological DVT prophylaxis and Dr. Zapata recommending to hold until we rule out infective endocarditis.  VTE prophylaxis: VTE Selection    I have performed a physical exam and reviewed and updated ROS and Plan today (3/29/2024). In review of yesterday's note (3/28/2024), there are no changes except as documented above.

## 2024-03-29 NOTE — ASSESSMENT & PLAN NOTE
X-ray abdomen for tube placement showed air-filled distended loops of bowel are seen with reactive mucosal pattern in the upper abdomen, appearance suggest ileus or enteritis.  He has not had a bowel movement I ordered x-ray abdomen this afternoon.

## 2024-03-29 NOTE — PROGRESS NOTES
Pt's BP is 156/73.. Pt is maxed on Nicardipine infusion at 15 mg/hr. This RN is unabel to administer scheduled  medications per MAR for BP via Iris since tube feeding placement results have not yet been verified. Iris was placed in L nare at 10:43 and placement has not yet been verified at 1726. This RN will hold reschedule BP medications for noc shift to administer if placement of Iris is verified during noc shift.     Per Dr johnson it is okay to administer PRN 10 mg IV labetalol per MAR for BP is 165/72     1814 /72 PRN hydralazine 20 mg IV was administered at 1815. Per Dr. Johnson it is okay to administer medication early since pt's BP is still elevated    BP re-checked and is 143/63

## 2024-03-29 NOTE — ASSESSMENT & PLAN NOTE
Patient found to have hypernatremia.  I increase frequency and amount of free water flushes now I ordered every 4 hourly 200 cc of free water flushes.  Current sodium level is 152.  I ordered repeat BMP at 1 PM.  Continue to monitor.

## 2024-03-29 NOTE — PROGRESS NOTES
Xray report read. Dr. Aguilar contacted to confirm commencement of tube use. Advised okay to use tube.  Medications given via tube.  Tube feed (Pivot 1.5) commenced at 20ml/hr, no flush, as per order.

## 2024-03-29 NOTE — PROGRESS NOTES
4 Eyes Skin Assessment Completed by RIGOBERTO Rivers and Asad Roberts RN with wound care    Head WDL  Ears Redness right ear redness laceration sutures in place, dried blood cleansed and open to air  Nose WDL  Mouth WDL  Neck WDL  Breast/Chest WDL  Shoulder Blades WDL  Spine WDL  (R) Arm/Elbow/Hand Redness, Blanching, and Discoloration  (L) Arm/Elbow/Hand Redness, Blanching, and Discoloration  Abdomen Redness  Groin Redness, Excoriation, Rash, and Swelling penis and groin area are red, moisture. Site are is pink and red, cleansed and anifungal therapy nystatin powder was applied. Scrotum is enlarged and red  Scrotum/Coccyx/Buttocks Redness, Non-Blanching, Excoriation, and Discoloration non-blanching DTI purple spot on sacrum  (R) Leg Redness, Non-Blanching, and Abrasion    Right and left foot have pressure injury on toe. D2nd toe on Right foot non-blanching red, purple spots. DTI on bilateral toes   (L) Leg Redness and Blanching  (R) heel Redness, Blanching, Non-Blanching, and Discoloration  (L) heel Redness and Blanching          Devices In Places ECG, Blood Pressure Cuff, Pulse Ox, SCD's, Condom Cath, and Oxy Mask      Interventions In Place InterDry, Heel Mepilex, Heel Float Boots, TAP System, Pillows, Elbow Mepilex, Q2 Turns, Low Air Loss Mattress, Barrier Cream, Heels Loaded W/Pillows, and Pressure Redistribution Mattress    Possible Skin Injury Yes    Pictures Uploaded Into Epic Yes  Wound Consult Placed Yes  RN Wound Prevention Protocol Ordered Yes

## 2024-03-29 NOTE — PROGRESS NOTES
Patient pulled Iris tubing from nare this morning around 0900. Tube feed immediately stopped by RN. Patient nonverbal, not following  commands. Bilateral wrist restraints placed for removal of medical equipment. Dr. Johnson updated at order placed. RN called patient daughter Cheyenne and left voicemail to call staff for updates.     Iris placement device is not in working condition.   RIGOBERTO Garza, charge RN Paola made aware of patient need for Iris reinsertion, pending RICU or TICU super user assistance.    Around 1400 RICU and TICU super users assisting higher level patients and unable to place this patients Iris  Dr. Johnson notified and placed order for NGT.     Multiple RN attempted to insert NGT.   Third insertion attempt completed around 1700 and ABD Xray results confirmed tube placement.   Tube feeds restarted and antihypertensive medications administered.

## 2024-03-29 NOTE — PROGRESS NOTES
Radiology contact by Charge RN re.tube placement xray.  Has not yet been read.  Unable to commence tube feed or give PO medications.  Patient continues on nicardipine drip and PRN labetalol and hydralazine for BP management.

## 2024-03-29 NOTE — PROGRESS NOTES
Hospital Medicine Daily Progress Note    Date of Service  3/28/2024    Chief Complaint  Andrews Sanchez is a 63 y.o. male admitted 3/24/2024 after he found down    Hospital Course    63 y.o. male with past medical history of hypertension hypercholesterolemia who presented 3/24/2024 after he found down for an unknown amount of 9 and he was covered in feces.  Patient found to have significant elevated blood pressure and as per the chart review the blood pressure was 226/143 on upon arrival to [facility].  He was diagnosed with hypertensive crisis, hypoxic respiratory failure, acute encephalopathy and acute kidney injury.  On imaging studies patient found to have 1.2 mm left frontal hyperdensity.  He underwent MRI brain that showed diffuse confluent FLAIR.  He was placed on Precedex gtt. for ongoing agitation and also he was placed on nicardipine infusion for uncontrolled hypertension.     On March 26, 2024 intensivist Dr. Carrera requested to transfer care to hospitalist service.    Interval Problem Update    03/27/24    I evaluated and examined him at the bedside.  Blood pressure still running on the higher side.  I added hydralazine.  Due to patient renal functions I did not ACE or ARB's.  He also found to have mild bradycardia and I did not add beta-blocker.  He still requiring Cardizem gtt.  I am continuing and titrating as per target blood pressure of 120-150.  He is still requiring Precedex gtt. for ongoing agitation.  I am continuing and titrating as per RASS of -1 to +1.  I requested consultation from cardiology for PAULINE as recommended by neurology to evaluate for infective endocarditis.  I discussed plan of care with neurologist Dr. Zapata.  I attempted to call patient's daughter but it was the wrong number.  I ordered CTA head and neck as recommended by neurology.  I evaluated multiple times throughout the day.  Plan is to get feeding tube for nutrition as well as for medication.    03/28/24    I have  noted and examined him at the bedside.  He remains critically ill and not following commands but  He underwent feeding tube placement.  I reviewed CT head and neck that did not show any acute normalities per  I called patient's daughter Cheyenne and I discussed plan of care with the and answered all her questions.  He is still requiring significant amount of nicardipine gtt. I am continuing and titrating nicardipine gtt. as per target systolic blood pressure of 120 to 150 mmHg.  I am also continuing titrating Precedex gtt. as per RASS of -1 to +1.  I personally discussed plan of care with neurologist Dr. Zapata.      I have discussed this patient's plan of care and discharge plan at IDT rounds today with Case Management, Nursing, Nursing leadership, and other members of the IDT team.    Consultants/Specialty  critical care    Code Status  Full Code    Disposition  Medically Cleared  I have placed the appropriate orders for post-discharge needs.    Review of Systems  Review of Systems   Unable to perform ROS: Mental acuity        Physical Exam  Temp:  [36 °C (96.8 °F)-37.3 °C (99.1 °F)] 37.2 °C (99 °F)  Pulse:  [54-93] 82  Resp:  [13-27] 24  BP: (109-165)/(54-77) 143/63  SpO2:  [88 %-98 %] 95 %    Physical Exam  Vitals reviewed.   Constitutional:       General: He is in acute distress.      Appearance: He is ill-appearing.      Comments: Soft restraints   HENT:      Head: Normocephalic and atraumatic. No contusion.      Right Ear: External ear normal.      Left Ear: External ear normal.      Nose: Nose normal.      Comments: Feeding tube     Mouth/Throat:      Pharynx: No oropharyngeal exudate.   Eyes:      General:         Right eye: No discharge.         Left eye: No discharge.      Pupils: Pupils are equal, round, and reactive to light.   Cardiovascular:      Rate and Rhythm: Normal rate and regular rhythm.      Heart sounds: No murmur heard.     No friction rub. No gallop.   Pulmonary:      Effort: Pulmonary  effort is normal.      Breath sounds: No wheezing or rhonchi.   Abdominal:      General: Bowel sounds are normal. There is no distension.      Palpations: Abdomen is soft.      Tenderness: There is no abdominal tenderness. There is no rebound.   Musculoskeletal:         General: No swelling or tenderness. Normal range of motion.      Cervical back: No rigidity. No muscular tenderness.   Skin:     General: Skin is warm and dry.      Coloration: Skin is not jaundiced.   Neurological:      General: No focal deficit present.      Mental Status: He is alert.      Cranial Nerves: No cranial nerve deficit.      Sensory: No sensory deficit.   Psychiatric:         Mood and Affect: Mood normal.         Fluids    Intake/Output Summary (Last 24 hours) at 3/28/2024 1824  Last data filed at 3/28/2024 1700  Gross per 24 hour   Intake 2853.77 ml   Output 1750 ml   Net 1103.77 ml       Laboratory  Recent Labs     03/26/24  0146 03/27/24  0450 03/28/24  0330   WBC 9.9 9.4 8.1   RBC 4.70 4.72 4.48*   HEMOGLOBIN 14.0 14.2 13.4*   HEMATOCRIT 42.9 42.8 41.1*   MCV 91.3 90.7 91.7   MCH 29.8 30.1 29.9   MCHC 32.6 33.2 32.6   RDW 50.6* 50.3* 52.4*   PLATELETCT 218 230 225   MPV 11.0 10.8 10.4     Recent Labs     03/26/24  0950 03/27/24  1000 03/28/24  0330   SODIUM 144 143 145   POTASSIUM 3.8 3.7 3.6   CHLORIDE 110 109 113*   CO2 23 21 17*   GLUCOSE 77 87 90   BUN 18 18 20   CREATININE 1.47* 1.51* 1.63*   CALCIUM 8.5 8.7 8.4*               Recent Labs     03/28/24  0330   TRIGLYCERIDE 59   HDL 41   LDL 95       Imaging  CT-CTA NECK WITH & W/O-POST PROCESSING   Final Result      1.  No acute neck arterial abnormality detected. No significant arterial stenosis.      CT-CTA HEAD WITH & W/O-POST PROCESS   Final Result      1.  No acute arterial abnormality is detected.   2.  Severe white matter, brainstem and cerebellar hypoattenuation, indeterminate.   3.  2 mm density left frontal lobe suspicious for a small microhemorrhage.   4.  13 x 8 mm  indeterminate focus of hypoattenuation in the right cerebellum.      MR-BRAIN-W/O   Final Result      1.  Diffuse confluent FLAIR hyperintense signal abnormality throughout the supratentorial white matter including posterior limbs internal capsules, subinsular white matter, as well as thalamic gray matter. Findings would be atypical and extreme for    chronic microvascular ischemic change. Toxic or metabolic leukoencephalopathy or consequence of other diffuse white matter insult including sequela of narcotic or other substance abuse might be considered. The extremely diffuse involvement would also be    very atypical for posterior reversible encephalopathy syndrome (PRES).   2.  There is also diffuse FLAIR signal involving the brainstem, middle cerebellar peduncles, and cerebellar white matter, presumably the same etiology as the supratentorial process.   3.  Innumerable supratentorial and posterior fossa foci of microhemorrhage compatible with the given history of hypertension. One of these foci probably corresponds to the punctate focus of acute hemorrhagic density in the left frontal deep white matter    seen on the CT scan. This focus shows no significant surrounding vasogenic edema or mass effect.   4.  11 mm ovoid focus of FLAIR hyperintensity in the right cerebellar deep white matter likely representing encephalomalacia related to old infarct or other remote insult.   5.  Subcentimeter foci x3 of bright diffusion signal involving the left frontal deep white matter, left parietal deep white matter, and left occipital peritrigonal white matter respectively, consistent with acute lacunar size infarcts.      [id number] VIEW   Final Result      1.  No evidence of bowel obstruction.   2.  Lumbar fusion hardware and metallic clips. No other metallic foreign body seen.   3.  Severe degenerative changes bilateral hip joints with likely AVN of the bilateral femoral heads.      EC-ECHOCARDIOGRAM COMPLETE W/O CONT    Final Result      CT-HEAD W/O   Final Result   Addendum (preliminary) 1 of 1   VOALTE message of critical findings sent to Dr. Huizar at 3/24/2024 11:37    AM. I also received confirmation of receipt of VOALTE message back from    the provider.      Final      1.  2 mm focus of hyperdensity in the left frontal white matter. This could be a small hemorrhage versus other etiology as above.   2.  Advanced confluent nonspecific white matter hypoattenuation which is markedly abnormal. Recommend follow-up with brain MRI study.      Based solely on CT findings, the brain injury guideline category is mBIG 1.      SDH < 4mm   IPH < 4mm   SAH < 3 sulci and < 1mm      The original BIG retrospective analysis found radiographic progression in 0% of BIG 1 patients and 2.6% BIG 2.      Efforts are underway to contact referring physician with results at time of dictation.      DX-CHEST-PORTABLE (1 VIEW)   Final Result         Hazy bibasilar opacities, left more than right, atelectasis or infection.      DX-ABDOMEN FOR TUBE PLACEMENT    (Results Pending)        Assessment/Plan  * Hypertensive crisis- (present on admission)  Assessment & Plan  3/28/2024   History of HTN, not on home meds per chart review  UDS positive for meth, possible etiology  I am continuing and titrating nicardipine gtt. plan is to slowly decrease blood pressure, target SBP around 120-150  I added hydralazine p.o. to control blood pressure on p.o. meds so we can wean him off from nicardipine gtt.  Now he is on p.o. hydralazine and nitro.    Multiple lacunar infarcts (HCC)  Assessment & Plan  MRI showed acute lacunar size infarcts.  Patient will need Zio patch at the time of discharge.  Ordered HbA1c and lipid profile.  I started him on aspirin and atorvastatin.  I reviewed CT head and neck that did not show any acute abnormalities.  After discussing it with neurology I started him on DVT prophylaxis with Lovenox.  I ordered non-tPA stroke order  set.    Debility  Assessment & Plan  PT/OT consult requested    Bacteremia due to Gram-negative bacteria  Assessment & Plan  1/2 BC + for GNR  Patient was admitted at [facility] on 9/2022 and 12/2022 for left lower extremity abscess that grew enterobacter, enterococcus, deteriorates   No overt murmurs and TTE was unrevealing  Patient has hardware in his back but no back pain on physical exam  Unclear source  Culture came back as a contaminant and I discontinued antibiotic to avoid adverse effects.      Prolonged Q-T interval on ECG  Assessment & Plan  3/28/2024   QTc 503  Continue to monitor colitis and replace as needed.  Avoid QT prolonging agents as able  Continue to monitor closely on telemetry.    Abnormal imaging of central nervous system  Assessment & Plan  CT head w/o contrast revealing 2mm focus hyperdensity in left frontal white matter; vascular neurology evaluated patient in ED and think this is an incidental finding and not likely intraparenchymal hemorrhage  MRI completed on 3/25, multiple findings,   Neurology evaluated him and made recommendations.  Continue to monitor closely on telemetry.  Continue to monitor blood pressure.    Hypophosphatemia  Assessment & Plan  3/28/2024   Continue to monitor and replace as needed.    Hypokalemia  Assessment & Plan  3/28/2024   Continue to monitor and replace as needed.    FRANKLYN (acute kidney injury) (HCC)  Assessment & Plan  3/28/2024   Unclear baseline, possibly 1.0-1.1 however last stable Cr from 2022  UA relatively bland, protein loss noted  Limit nephrotoxins and renally dose as appropriate  Renal functions remain similar.  Ordered repeat lab workup for tomorrow morning.    Acute metabolic encephalopathy  Assessment & Plan  Unclear etiology, possibly multifactorial in setting of meth use, Wernicke's (history of alcohol use, unclear if current), PRES, acute hypoxic respiratory failure  CT head revealed 2mm focus hyperdensity (vascular neurology evaluated  patient in ED and think finding is incidental rather than intraparenchymal hemorrhage)  I reviewed finding of MRI brain.    I discussed plan of care with neurologist.  Due to MRI finding neurologist recommended Zio patch at the time of discharge.  Started on empiric IV thiamine   Continue to monitor closely.  He remains significantly agitated.  I am continuing and titrating Precedex as per RASS of -1 to +1.  I ordered EEG after discussing it with neurology..      Elevated troponin  Assessment & Plan  Possibly related to underlying demand ischemia with decreased renal clearance   Troponins trended  Continue monitor closely on telemetry      HFrEF (heart failure with reduced ejection fraction) (HCC)  Assessment & Plan  3/28/2024   No known history of heart failure, possibly meth induced  Echo (3/24): Per report, mild concentric LVH, EF estimated to be 25-30%   Likely initiation of GDMT once patient  is more stable.  He will need follow-up with cardiology for ischemic workup.  I requested consultation with cardiology and they are following him.    Acute hypoxic respiratory failure (HCC)  Assessment & Plan  Unclear if the patient is on oxygen at home, no known history of COPD  Bibasilar opacities L > R, empirically started on Unasyn in the ED but later discontinued as etiology preferred to be pulmonary edema, overall decreasing O2 requirements  Currently is requiring 3 L of oxygen via OxyMask to maintain oxygen saturation.    Positive urine drug screen- (present on admission)  Assessment & Plan  UDS positive for amphetamines, this problem dates back many years with prior positive urine drug screens.    Counseling when appropriate.    Essential hypertension- (present on admission)  Assessment & Plan  Continue p.o. hydralazine and cardiology also started him on nitro.  Now patient has feeding tube so we can use enteral access for blood pressure medications.  I am holding ACE and ARB's due to renal functions and he has  mild bradycardia I did not start him on beta-blocker.  I am continuing titrating nicardipine gtt as per target systolic blood pressure of 120-150.  Continue to monitor closely.      Today again I personally discussed case with neurologist Dr. Zapata regarding patient current medical condition and plan of care.    I reviewed cardiology note    I called patient's daughter and updated her regarding Mr. Sanchez current medical condition and plan of care.      Patient is critically ill.   The patient continues to have: Uncontrolled hypertension and agitation  The vital organ system that is affected is the: Cardiovascular system and is CNS  If untreated there is a high chance of deterioration into: Cardiovascular collapse  And eventually death.   The critical care that I am providing today is:  I am continuing and titrating nicardipine gtt. as per target systolic blood pressure of 120 to 150 mmHg.  I am continuing and titrating Precedex gtt. as per RASS of -1 to +1.  The critical that has been undertaken is medically complex.   There has been no overlap in critical care time.   Critical Care Time not including procedures: 38 minutes       I discussed with neurology regarding pharmacological DVT prophylaxis and Dr. Zapata recommending to hold until we rule out infective endocarditis.  VTE prophylaxis:    enoxaparin ppx      I have performed a physical exam and reviewed and updated ROS and Plan today (3/28/2024). In review of yesterday's note (3/27/2024), there are no changes except as documented above.

## 2024-03-29 NOTE — PROGRESS NOTES
4 Eyes Skin Assessment Completed by RIGOBERTO Bowden and RIGOBERTO Villalba.    Head WDL  Ears Redness, laceration sutured  Nose WDL  Mouth WDL  Neck WDL  Breast/Chest WDL  Shoulder Blades WDL  Spine WDL  (R) Arm/Elbow/Hand Redness, Blanching, Discoloration, and Edema, Bruising  (L) Arm/Elbow/Hand Redness, Blanching, Discoloration, and Edema, Bruising  Abdomen Redness  Groin Redness, Excoriation, Rash, and Swelling  Scrotum/Coccyx/Buttocks Redness, Non-Blanching, and Excoriation, nystatin powder, DTI to sacrum  (R) Leg Redness, Non-Blanching, and Abrasion  (L) Leg Redness, Blanching, and Scar  (R) Heel/Foot/Toe Redness and Discoloration, DTIs to toes  (L) Heel/Foot/Toe Redness and Discoloration, DTIs to toes          Devices In Places ECG, Blood Pressure Cuff, Pulse Ox, SCD's, Condom Cath, and Oxy Mask      Interventions In Place Heel Mepilex, Heel Float Boots, TAP System, Pillows, Elbow Mepilex, Low Air Loss Mattress, and Heels Loaded W/Pillows    Possible Skin Injury Yes    Pictures Uploaded Into Epic Yes  Wound Consult Placed Yes  RN Wound Prevention Protocol Ordered Yes

## 2024-03-29 NOTE — CARE PLAN
The patient is Watcher - Medium risk of patient condition declining or worsening    Shift Goals  Clinical Goals: hemodynamic stability, [id number] checks  Patient Goals: POLINA  Family Goals: POLINA    Progress made toward(s) clinical / shift goals:        Problem: Safety - Medical Restraint  Goal: Free from restraint(s) (Restraint for Interference with Medical Device)  Outcome: Progressing     Problem: Pain - Standard  Goal: Alleviation of pain or a reduction in pain to the patient’s comfort goal  Outcome: Progressing     Problem: Skin Integrity  Goal: Skin integrity is maintained or improved  Outcome: Progressing     Problem: Fall Risk  Goal: Patient will remain free from falls  Outcome: Progressing     Problem: Hemodynamics  Goal: Patient's hemodynamics, fluid balance and neurologic status will be stable or improve  Outcome: Progressing     Problem: Respiratory  Goal: Patient will achieve/maintain optimum respiratory ventilation and gas exchange  Outcome: Progressing     Problem: Urinary Elimination  Goal: Establish and maintain regular urinary output  Outcome: Progressing       Patient is not progressing towards the following goals:      Problem: Knowledge Deficit - Standard  Goal: Patient and family/care givers will demonstrate understanding of plan of care, disease process/condition, diagnostic tests and medications  Outcome: Not Progressing     Problem: Psychosocial  Goal: Patient's level of anxiety will decrease  Outcome: Not Progressing  Goal: Patient's ability to verbalize feelings about condition will improve  Outcome: Not Progressing     Problem: Nutrition  Goal: Patient's nutritional and fluid intake will be adequate or improve  Outcome: Not Progressing     Problem: Psychosocial - Patient Condition  Goal: Patient's ability to verbalize feelings about condition will improve  Outcome: Not Progressing     Problem: Hemodynamic Monitoring  Goal: Patient's hemodynamics, fluid balance and neurologic status will be  stable or improve  Outcome: Not Progressing

## 2024-03-29 NOTE — PROGRESS NOTES
2038 Charge RN spoke with radiology dept. About this patients abd xray for tube placement that was done at 1100 which has not been read. Was updated that it somehow got missed and was not put on the list to be read.    0425 Charge RN spoke with radiology dept again as xray still has not been read, informed that it should be read by end of shift.

## 2024-03-29 NOTE — THERAPY
Occupational Therapy Contact Note    Patient Name: Andrews Sanchez  Age:  64 y.o., Sex:  male  Medical Record #: [id number]  Today's Date: 3/29/2024    Spoke with PT who discussed with RN, pt still with limited arousal, limited response to sternal rub. Will hold and follow up as able/appropriate.    Jordi Guerra OTD, OTR/L

## 2024-03-29 NOTE — THERAPY
Speech Language Therapy Contact Note    Patient Name: Andrews Sanchez  Age:  64 y.o., Sex:  male  Medical Record #: [id number]  Today's Date: 3/29/2024       03/29/24 0984   Treatment Variance   Reason For Missed Therapy Medical - Patient Is Not Medically Stable   Initial Contact Note    Initial Contact Note  Order Received and Verified, Speech Therapy Evaluation in Progress with Full Report to Follow.   Interdisciplinary Plan of Care Collaboration   IDT Collaboration with  Nursing   Collaboration Comments Per RN, pt still not appropriate for a swallow evaluation. SLP to follow when medically appropriate.

## 2024-03-30 LAB
ALBUMIN SERPL BCP-MCNC: 3.5 G/DL (ref 3.2–4.9)
ALBUMIN/GLOB SERPL: 1.3 G/DL
ALP SERPL-CCNC: 88 U/L (ref 30–99)
ALT SERPL-CCNC: 18 U/L (ref 2–50)
AMMONIA PLAS-SCNC: 14 UMOL/L (ref 11–45)
ANION GAP SERPL CALC-SCNC: 13 MMOL/L (ref 7–16)
ANION GAP SERPL CALC-SCNC: 13 MMOL/L (ref 7–16)
ARTERIAL PATENCY WRIST A: NORMAL
AST SERPL-CCNC: 26 U/L (ref 12–45)
BASE EXCESS BLDA CALC-SCNC: -2 MMOL/L (ref -4–3)
BILIRUB SERPL-MCNC: 0.8 MG/DL (ref 0.1–1.5)
BODY TEMPERATURE: NORMAL DEGREES
BUN SERPL-MCNC: 28 MG/DL (ref 8–22)
BUN SERPL-MCNC: 30 MG/DL (ref 8–22)
CALCIUM ALBUM COR SERPL-MCNC: 9 MG/DL (ref 8.5–10.5)
CALCIUM SERPL-MCNC: 8.6 MG/DL (ref 8.5–10.5)
CALCIUM SERPL-MCNC: 8.9 MG/DL (ref 8.5–10.5)
CHLORIDE SERPL-SCNC: 115 MMOL/L (ref 96–112)
CHLORIDE SERPL-SCNC: 117 MMOL/L (ref 96–112)
CO2 BLDA-SCNC: 23 MMOL/L (ref 20–33)
CO2 SERPL-SCNC: 18 MMOL/L (ref 20–33)
CO2 SERPL-SCNC: 20 MMOL/L (ref 20–33)
CREAT SERPL-MCNC: 1.64 MG/DL (ref 0.5–1.4)
CREAT SERPL-MCNC: 1.66 MG/DL (ref 0.5–1.4)
DELSYS IDSYS: NORMAL
ERYTHROCYTE [DISTWIDTH] IN BLOOD BY AUTOMATED COUNT: 54.6 FL (ref 35.9–50)
GFR SERPLBLD CREATININE-BSD FMLA CKD-EPI: 45 ML/MIN/1.73 M 2
GFR SERPLBLD CREATININE-BSD FMLA CKD-EPI: 46 ML/MIN/1.73 M 2
GLOBULIN SER CALC-MCNC: 2.7 G/DL (ref 1.9–3.5)
GLUCOSE SERPL-MCNC: 134 MG/DL (ref 65–99)
GLUCOSE SERPL-MCNC: 136 MG/DL (ref 65–99)
HCO3 BLDA-SCNC: 21.9 MMOL/L (ref 17–25)
HCT VFR BLD AUTO: 40.2 % (ref 42–52)
HGB BLD-MCNC: 13.1 G/DL (ref 14–18)
LACTATE BLD-SCNC: 0.5 MMOL/L (ref 0.5–2)
LPM ILPM: 6 LPM
MCH RBC QN AUTO: 29.8 PG (ref 27–33)
MCHC RBC AUTO-ENTMCNC: 32.6 G/DL (ref 32.3–36.5)
MCV RBC AUTO: 91.4 FL (ref 81.4–97.8)
PCO2 BLDA: 32.2 MMHG (ref 26–37)
PCO2 TEMP ADJ BLDA: 31.4 MMHG (ref 26–37)
PH BLDA: 7.44 [PH] (ref 7.4–7.5)
PH TEMP ADJ BLDA: 7.45 [PH] (ref 7.4–7.5)
PHOSPHATE SERPL-MCNC: 3.1 MG/DL (ref 2.5–4.5)
PLATELET # BLD AUTO: 255 K/UL (ref 164–446)
PMV BLD AUTO: 10.2 FL (ref 9–12.9)
PO2 BLDA: 77 MMHG (ref 64–87)
PO2 TEMP ADJ BLDA: 74 MMHG (ref 64–87)
POTASSIUM SERPL-SCNC: 3.5 MMOL/L (ref 3.6–5.5)
POTASSIUM SERPL-SCNC: 3.7 MMOL/L (ref 3.6–5.5)
PROT SERPL-MCNC: 6.2 G/DL (ref 6–8.2)
RBC # BLD AUTO: 4.4 M/UL (ref 4.7–6.1)
SAO2 % BLDA: 96 % (ref 93–99)
SODIUM SERPL-SCNC: 148 MMOL/L (ref 135–145)
SODIUM SERPL-SCNC: 148 MMOL/L (ref 135–145)
SPECIMEN DRAWN FROM PATIENT: NORMAL
WBC # BLD AUTO: 12.7 K/UL (ref 4.8–10.8)

## 2024-03-30 PROCEDURE — 83605 ASSAY OF LACTIC ACID: CPT

## 2024-03-30 PROCEDURE — 700102 HCHG RX REV CODE 250 W/ 637 OVERRIDE(OP): Performed by: INTERNAL MEDICINE

## 2024-03-30 PROCEDURE — 82140 ASSAY OF AMMONIA: CPT

## 2024-03-30 PROCEDURE — 99291 CRITICAL CARE FIRST HOUR: CPT | Performed by: INTERNAL MEDICINE

## 2024-03-30 PROCEDURE — 700105 HCHG RX REV CODE 258: Performed by: STUDENT IN AN ORGANIZED HEALTH CARE EDUCATION/TRAINING PROGRAM

## 2024-03-30 PROCEDURE — 85027 COMPLETE CBC AUTOMATED: CPT

## 2024-03-30 PROCEDURE — 82803 BLOOD GASES ANY COMBINATION: CPT

## 2024-03-30 PROCEDURE — 36600 WITHDRAWAL OF ARTERIAL BLOOD: CPT

## 2024-03-30 PROCEDURE — C9248 INJ, CLEVIDIPINE BUTYRATE: HCPCS | Mod: JZ | Performed by: INTERNAL MEDICINE

## 2024-03-30 PROCEDURE — 80053 COMPREHEN METABOLIC PANEL: CPT

## 2024-03-30 PROCEDURE — 80048 BASIC METABOLIC PNL TOTAL CA: CPT

## 2024-03-30 PROCEDURE — 770000 HCHG ROOM/CARE - INTERMEDIATE ICU *

## 2024-03-30 PROCEDURE — A9270 NON-COVERED ITEM OR SERVICE: HCPCS | Performed by: INTERNAL MEDICINE

## 2024-03-30 PROCEDURE — 700111 HCHG RX REV CODE 636 W/ 250 OVERRIDE (IP): Mod: JZ | Performed by: INTERNAL MEDICINE

## 2024-03-30 PROCEDURE — 99232 SBSQ HOSP IP/OBS MODERATE 35: CPT | Performed by: INTERNAL MEDICINE

## 2024-03-30 PROCEDURE — 700111 HCHG RX REV CODE 636 W/ 250 OVERRIDE (IP): Mod: JG | Performed by: INTERNAL MEDICINE

## 2024-03-30 PROCEDURE — A9270 NON-COVERED ITEM OR SERVICE: HCPCS | Performed by: STUDENT IN AN ORGANIZED HEALTH CARE EDUCATION/TRAINING PROGRAM

## 2024-03-30 PROCEDURE — 700102 HCHG RX REV CODE 250 W/ 637 OVERRIDE(OP): Performed by: STUDENT IN AN ORGANIZED HEALTH CARE EDUCATION/TRAINING PROGRAM

## 2024-03-30 PROCEDURE — 700111 HCHG RX REV CODE 636 W/ 250 OVERRIDE (IP): Mod: JZ | Performed by: STUDENT IN AN ORGANIZED HEALTH CARE EDUCATION/TRAINING PROGRAM

## 2024-03-30 PROCEDURE — 84100 ASSAY OF PHOSPHORUS: CPT

## 2024-03-30 RX ORDER — ISOSORBIDE DINITRATE 30 MG/1
30 TABLET ORAL EVERY 8 HOURS
Status: DISCONTINUED | OUTPATIENT
Start: 2024-03-30 | End: 2024-04-03

## 2024-03-30 RX ORDER — CLONIDINE HYDROCHLORIDE 0.1 MG/1
0.2 TABLET ORAL EVERY 8 HOURS
Status: DISCONTINUED | OUTPATIENT
Start: 2024-03-30 | End: 2024-04-03

## 2024-03-30 RX ORDER — MORPHINE SULFATE 4 MG/ML
2 INJECTION INTRAVENOUS ONCE
Status: COMPLETED | OUTPATIENT
Start: 2024-03-30 | End: 2024-03-30

## 2024-03-30 RX ORDER — CARVEDILOL 6.25 MG/1
6.25 TABLET ORAL 2 TIMES DAILY WITH MEALS
Status: DISCONTINUED | OUTPATIENT
Start: 2024-03-30 | End: 2024-04-03

## 2024-03-30 RX ADMIN — DILTIAZEM HYDROCHLORIDE 5 MG/HR: 5 INJECTION INTRAVENOUS at 00:53

## 2024-03-30 RX ADMIN — ISOSORBIDE DINITRATE 30 MG: 30 TABLET ORAL at 22:19

## 2024-03-30 RX ADMIN — HYDRALAZINE HYDROCHLORIDE 50 MG: 50 TABLET ORAL at 13:09

## 2024-03-30 RX ADMIN — LABETALOL HYDROCHLORIDE 10 MG: 5 INJECTION INTRAVENOUS at 05:33

## 2024-03-30 RX ADMIN — DOCUSATE SODIUM 50 MG AND SENNOSIDES 8.6 MG 2 TABLET: 8.6; 5 TABLET, FILM COATED ORAL at 17:23

## 2024-03-30 RX ADMIN — HYDRALAZINE HYDROCHLORIDE 50 MG: 50 TABLET ORAL at 05:33

## 2024-03-30 RX ADMIN — NYSTATIN: 100000 POWDER TOPICAL at 05:34

## 2024-03-30 RX ADMIN — ASPIRIN 81 MG 81 MG: 81 TABLET ORAL at 05:33

## 2024-03-30 RX ADMIN — HYDRALAZINE HYDROCHLORIDE 20 MG: 20 INJECTION, SOLUTION INTRAMUSCULAR; INTRAVENOUS at 03:20

## 2024-03-30 RX ADMIN — CLEVIPIDINE 19 MG/HR: 0.5 EMULSION INTRAVENOUS at 13:41

## 2024-03-30 RX ADMIN — ISOSORBIDE DINITRATE 20 MG: 20 TABLET ORAL at 13:09

## 2024-03-30 RX ADMIN — HYDRALAZINE HYDROCHLORIDE 20 MG: 20 INJECTION, SOLUTION INTRAMUSCULAR; INTRAVENOUS at 18:15

## 2024-03-30 RX ADMIN — HYDRALAZINE HYDROCHLORIDE 50 MG: 50 TABLET ORAL at 22:19

## 2024-03-30 RX ADMIN — ATORVASTATIN CALCIUM 80 MG: 80 TABLET, FILM COATED ORAL at 17:24

## 2024-03-30 RX ADMIN — POTASSIUM BICARBONATE 25 MEQ: 978 TABLET, EFFERVESCENT ORAL at 09:19

## 2024-03-30 RX ADMIN — CARVEDILOL 6.25 MG: 6.25 TABLET, FILM COATED ORAL at 09:18

## 2024-03-30 RX ADMIN — CLEVIPIDINE 3 MG/HR: 0.5 EMULSION INTRAVENOUS at 09:18

## 2024-03-30 RX ADMIN — NYSTATIN: 100000 POWDER TOPICAL at 17:24

## 2024-03-30 RX ADMIN — DILTIAZEM HYDROCHLORIDE 20 MG/HR: 5 INJECTION INTRAVENOUS at 05:39

## 2024-03-30 RX ADMIN — ISOSORBIDE DINITRATE 20 MG: 20 TABLET ORAL at 05:33

## 2024-03-30 RX ADMIN — MORPHINE SULFATE 2 MG: 4 INJECTION, SOLUTION INTRAMUSCULAR; INTRAVENOUS at 13:54

## 2024-03-30 RX ADMIN — CLONIDINE HYDROCHLORIDE 0.2 MG: 0.1 TABLET ORAL at 22:19

## 2024-03-30 RX ADMIN — ACETAMINOPHEN 650 MG: 325 TABLET, FILM COATED ORAL at 03:19

## 2024-03-30 RX ADMIN — CLEVIPIDINE 13 MG/HR: 0.5 EMULSION INTRAVENOUS at 12:45

## 2024-03-30 RX ADMIN — ACETAMINOPHEN 650 MG: 325 TABLET, FILM COATED ORAL at 13:21

## 2024-03-30 RX ADMIN — CLONIDINE HYDROCHLORIDE 0.2 MG: 0.1 TABLET ORAL at 16:16

## 2024-03-30 RX ADMIN — CARVEDILOL 6.25 MG: 6.25 TABLET, FILM COATED ORAL at 17:24

## 2024-03-30 RX ADMIN — CLEVIPIDINE 21 MG/HR: 0.5 EMULSION INTRAVENOUS at 15:00

## 2024-03-30 RX ADMIN — LABETALOL HYDROCHLORIDE 10 MG: 5 INJECTION INTRAVENOUS at 15:22

## 2024-03-30 RX ADMIN — HYDRALAZINE HYDROCHLORIDE 20 MG: 20 INJECTION, SOLUTION INTRAMUSCULAR; INTRAVENOUS at 14:41

## 2024-03-30 RX ADMIN — ENOXAPARIN SODIUM 40 MG: 100 INJECTION SUBCUTANEOUS at 17:24

## 2024-03-30 RX ADMIN — HYDRALAZINE HYDROCHLORIDE 20 MG: 20 INJECTION, SOLUTION INTRAMUSCULAR; INTRAVENOUS at 07:34

## 2024-03-30 ASSESSMENT — PAIN DESCRIPTION - PAIN TYPE
TYPE: ACUTE PAIN

## 2024-03-30 ASSESSMENT — FIBROSIS 4 INDEX: FIB4 SCORE: 1.59

## 2024-03-30 NOTE — CARE PLAN
The patient is Watcher - Medium risk of patient condition declining or worsening    Shift Goals  Clinical Goals: SBP <150  Patient Goals: Rest  Family Goals: POLINA    Progress made toward(s) clinical / shift goals:    Problem: Safety - Medical Restraint  Goal: Remains free of injury from restraints (Restraint for Interference with Medical Device)  Outcome: Progressing     Problem: Pain - Standard  Goal: Alleviation of pain or a reduction in pain to the patient’s comfort goal  Outcome: Progressing  Note: Pt assessed for pain regularly and medicated PRN per MAR.        Patient is not progressing towards the following goals:      Problem: Knowledge Deficit - Standard  Goal: Patient and family/care givers will demonstrate understanding of plan of care, disease process/condition, diagnostic tests and medications  Outcome: Not Progressing  Note: Pt educated regarding plan of care and medications. All questions answered.      Problem: Fall Risk  Goal: Patient will remain free from falls  Outcome: Not Progressing  Note: Fall precautions in place. Bed in lowest position. Non-skid socks in place. Personal possessions within reach. Mobility sign on door. Bed-alarm on. Call light within reach. Pt educated regarding fall prevention and states understanding.      Problem: Hemodynamics  Goal: Patient's hemodynamics, fluid balance and neurologic status will be stable or improve  Outcome: Not Progressing  Note: Monitor vital signs, pulse oximetry and cardiac monitor per provider order and/or policy 2. Maintain blood pressure per provider order 3. Hemodynamic monitoring per provider order      Problem: Bowel Elimination  Goal: Establish and maintain regular bowel function  Outcome: Not Progressing

## 2024-03-30 NOTE — PROGRESS NOTES
Pt /72, PRN labetalol given per MAR. Dr Johnson updated. Per Dr Johnson, clevidipine discontinued.

## 2024-03-30 NOTE — PROGRESS NOTES
Cardiology Follow Up Progress Note    Date of Service  3/29/2024    Attending Physician  CAMILLE Reyes*    Chief Complaint   Altered level of consciousness    ILEANA Sanchez is a 64 y.o. male admitted 3/24/2024 with hypertension, dyslipidemia, muscle disorder presents with altered level consciousness after being found down with a disheveled appearance.    Interim Events  Patient is largely noninteractive when asked questions however will track to voice command with his eyes and his head but is largely nonverbal.  He is being followed closely by the neurology service and the hospital medicine team.  No acute events per nursing staff.    Review of Systems  Review of Systems    Vital signs in last 24 hours  Temp:  [36.9 °C (98.5 °F)-37 °C (98.6 °F)] 37 °C (98.6 °F)  Pulse:  [60-94] 94  Resp:  [15-37] 16  BP: (109-167)/(54-88) 167/76  SpO2:  [94 %-98 %] 95 %    Physical Exam  Physical Exam  Constitutional:       Appearance: He is ill-appearing.   HENT:      Head: Normocephalic and atraumatic.      Mouth/Throat:      Mouth: Mucous membranes are moist.      Pharynx: Oropharynx is clear.   Eyes:      Extraocular Movements: Extraocular movements intact.      Conjunctiva/sclera: Conjunctivae normal.   Cardiovascular:      Rate and Rhythm: Normal rate and regular rhythm.      Pulses: Normal pulses.      Heart sounds: Normal heart sounds. No murmur heard.     No friction rub. No gallop.   Pulmonary:      Effort: Pulmonary effort is normal.      Breath sounds: Normal breath sounds.   Abdominal:      General: Bowel sounds are normal.      Palpations: Abdomen is soft.   Musculoskeletal:         General: Normal range of motion.      Cervical back: Normal range of motion and neck supple.      Right lower leg: No edema.      Left lower leg: No edema.   Skin:     General: Skin is warm and dry.   Neurological:      General: No focal deficit present.      Mental Status: He is oriented to person, place, and time.  "Mental status is at baseline.         Lab Review  Lab Results   Component Value Date/Time    WBC 11.1 (H) 03/29/2024 03:20 AM    RBC 4.50 (L) 03/29/2024 03:20 AM    HEMOGLOBIN 13.3 (L) 03/29/2024 03:20 AM    HEMATOCRIT 41.9 (L) 03/29/2024 03:20 AM    MCV 93.1 03/29/2024 03:20 AM    MCH 29.6 03/29/2024 03:20 AM    MCHC 31.7 (L) 03/29/2024 03:20 AM    MPV 10.1 03/29/2024 03:20 AM      Lab Results   Component Value Date/Time    SODIUM 149 (H) 03/29/2024 01:52 PM    POTASSIUM 3.7 03/29/2024 01:52 PM    CHLORIDE 118 (H) 03/29/2024 01:52 PM    CO2 16 (L) 03/29/2024 01:52 PM    GLUCOSE 106 (H) 03/29/2024 01:52 PM    BUN 27 (H) 03/29/2024 01:52 PM    CREATININE 1.79 (H) 03/29/2024 01:52 PM    BUNCREATRAT 20.0 09/07/2022 08:25 AM    BUNCREATRAT 20 07/12/2016 07:20 AM      Lab Results   Component Value Date/Time    ASTSGOT 23 03/29/2024 03:20 AM    ALTSGPT 20 03/29/2024 03:20 AM     Lab Results   Component Value Date/Time    CHOLSTRLTOT 148 03/28/2024 03:30 AM    LDL 95 03/28/2024 03:30 AM    HDL 41 03/28/2024 03:30 AM    TRIGLYCERIDE 59 03/28/2024 03:30 AM    TROPONINT 63 (H) 03/25/2024 04:48 PM       No results for input(s): \"NTPROBNP\" in the last 72 hours.        Assessment/Plan  1.  Altered level of consciousness with unclear etiology suspected multiple cerebral microinfarcts  2.  Hypertensive emergency  3.  Newly diagnosed heart failure with reduced ejection fraction  4.  Positive methamphetamine  5.  Longstanding hypertension with multiple episodes of hypertensive urgency/emergency  6.  Acute kidney injury    Clinically, he is doing very poorly and appears to be in critical condition with waning response to overall vocal command and largely remain nonverbal and very minimal sedation.  He does have newly diagnosed heart failure with reduced ejection fraction which I suspect is multifactorial stemming from his hypertensive emergency coupled to positive methamphetamine use.  At this time he is still hypertensive and there " is some mild use of permissive hypertension at this time.  We will steadily add in appropriate heart failure medications in which Isordil was added to his regimen that already has hydralazine in place.  Based on his overall response to Isordil therapy it would be reasonable to start adding beta-blocker therapy however he is being allowed to have some permissive hypertension.  I have a very low clinical suspicion for an endocarditis source given that he had likely a contaminant in one of his blood cultures.       At this time we will be very conservative adding goal-directed medical therapy to minimize hypotensive episodes that may exacerbate cerebral perfusion.  It is advised that he abstain from further methamphetamine exposure given the strong correlation with toxic cardiomyopathy and likely contributed to his underlying hypertensive emergency    Prognosis is very guarded at this time.  Cardiology service will continue to follow    Critical care time: 35 minutes was utilized in direct face-to-face patient care, document preparation, document review, care coordination with his multi disciplinary team.  Discussion with the nursing staff      Thank you for allowing me to participate in the care of this patient.  I will continue to follow this patient    Please contact me with any questions.    Jesus Nazario D.O.   Cardiologist, [facility]  [phone]

## 2024-03-30 NOTE — CARE PLAN
The patient is Watcher - Medium risk of patient condition declining or worsening    Shift Goals  Clinical Goals: hemodynamic stability  Patient Goals: POLINA  Family Goals: POLINA    Progress made toward(s) clinical / shift goals:    Problem: Knowledge Deficit - Standard  Goal: Patient and family/care givers will demonstrate understanding of plan of care, disease process/condition, diagnostic tests and medications  Outcome: Not Progressing  Note: Pt educated regarding plan of care and medications. Pt lethargic, AMS       Problem: Safety - Medical Restraint  Goal: Remains free of injury from restraints (Restraint for Interference with Medical Device)  Outcome: Progressing  Flowsheets (Taken 3/29/2024 1940)  Addressed this shift: Remains free of injury from restraints (restraint for interference with medical device): Every 2 hours: Monitor safety, psychosocial status, comfort, nutrition and hydration     Problem: Skin Integrity  Goal: Skin integrity is maintained or improved  Outcome: Progressing     Problem: Hemodynamics  Goal: Patient's hemodynamics, fluid balance and neurologic status will be stable or improve  Outcome: Not Progressing  Note: Pt continued to have AMS, neuro checks q4 hrs performed, MD aware of pt lack of improvement       Patient is not progressing towards the following goals:      Problem: Knowledge Deficit - Standard  Goal: Patient and family/care givers will demonstrate understanding of plan of care, disease process/condition, diagnostic tests and medications  Outcome: Not Progressing  Note: Pt educated regarding plan of care and medications. Pt lethargic, AMS       Problem: Hemodynamics  Goal: Patient's hemodynamics, fluid balance and neurologic status will be stable or improve  Outcome: Not Progressing  Note: Pt continued to have AMS, neuro checks q4 hrs performed, MD aware of pt lack of improvement

## 2024-03-30 NOTE — PROGRESS NOTES
Cardiology Follow Up Progress Note    Date of Service  3/30/2024    Attending Physician  CAMILLE Reyes*    Chief Complaint   Altered level of consciousness    ILEANA Sanchez is a 64 y.o. male admitted 3/24/2024 with hypertension, dyslipidemia, muscle disorder presents with altered level consciousness after being found down with a disheveled appearance.    Interim Events  Patient is largely noninteractive when asked questions however will track to voice command with his eyes and his head but is largely nonverbal.  He is being followed closely by the neurology service and the hospital medicine team.  No acute events per nursing staff.    Review of Systems  Review of Systems    Vital signs in last 24 hours  Temp:  [36.2 °C (97.2 °F)-37.9 °C (100.3 °F)] 36.9 °C (98.4 °F)  Pulse:  [70-96] 74  Resp:  [16-50] 38  BP: (128-172)/(59-77) 150/75  SpO2:  [94 %-97 %] 95 %    Physical Exam  Physical Exam  Constitutional:       Appearance: He is ill-appearing.   HENT:      Head: Normocephalic and atraumatic.      Mouth/Throat:      Mouth: Mucous membranes are moist.      Pharynx: Oropharynx is clear.   Eyes:      Extraocular Movements: Extraocular movements intact.      Conjunctiva/sclera: Conjunctivae normal.   Cardiovascular:      Rate and Rhythm: Normal rate and regular rhythm.      Pulses: Normal pulses.      Heart sounds: Normal heart sounds. No murmur heard.     No friction rub. No gallop.   Pulmonary:      Effort: Pulmonary effort is normal.      Breath sounds: Normal breath sounds.   Abdominal:      General: Bowel sounds are normal.      Palpations: Abdomen is soft.   Musculoskeletal:         General: Normal range of motion.      Cervical back: Normal range of motion and neck supple.      Right lower leg: No edema.      Left lower leg: No edema.   Skin:     General: Skin is warm and dry.   Neurological:      General: No focal deficit present.      Mental Status: He is oriented to person, place, and  "time. Mental status is at baseline.         Lab Review  Lab Results   Component Value Date/Time    WBC 12.7 (H) 03/30/2024 01:36 AM    RBC 4.40 (L) 03/30/2024 01:36 AM    HEMOGLOBIN 13.1 (L) 03/30/2024 01:36 AM    HEMATOCRIT 40.2 (L) 03/30/2024 01:36 AM    MCV 91.4 03/30/2024 01:36 AM    MCH 29.8 03/30/2024 01:36 AM    MCHC 32.6 03/30/2024 01:36 AM    MPV 10.2 03/30/2024 01:36 AM      Lab Results   Component Value Date/Time    SODIUM 148 (H) 03/30/2024 01:36 AM    POTASSIUM 3.5 (L) 03/30/2024 01:36 AM    CHLORIDE 117 (H) 03/30/2024 01:36 AM    CO2 18 (L) 03/30/2024 01:36 AM    GLUCOSE 134 (H) 03/30/2024 01:36 AM    BUN 28 (H) 03/30/2024 01:36 AM    CREATININE 1.66 (H) 03/30/2024 01:36 AM    BUNCREATRAT 20.0 09/07/2022 08:25 AM    BUNCREATRAT 20 07/12/2016 07:20 AM      Lab Results   Component Value Date/Time    ASTSGOT 26 03/30/2024 01:36 AM    ALTSGPT 18 03/30/2024 01:36 AM     Lab Results   Component Value Date/Time    CHOLSTRLTOT 148 03/28/2024 03:30 AM    LDL 95 03/28/2024 03:30 AM    HDL 41 03/28/2024 03:30 AM    TRIGLYCERIDE 59 03/28/2024 03:30 AM    TROPONINT 63 (H) 03/25/2024 04:48 PM       No results for input(s): \"NTPROBNP\" in the last 72 hours.        Assessment/Plan  1.  Altered level of consciousness with unclear etiology suspected multiple cerebral microinfarcts  2.  Hypertensive emergency  3.  Newly diagnosed heart failure with reduced ejection fraction  4.  Positive methamphetamine  5.  Longstanding hypertension with multiple episodes of hypertensive urgency/emergency  6.  Acute kidney injury    Clinically, he is doing very poorly and appears to be in critical condition with waning response to overall vocal command and largely remain nonverbal and very minimal sedation.  Multiple attempts for utilized to engage the patient.  He does have newly diagnosed heart failure with reduced ejection fraction which I suspect is multifactorial stemming from his hypertensive emergency coupled to positive " methamphetamine use.  At this time he is still hypertensive and there is some mild use of permissive hypertension at this time.  We will steadily add in appropriate heart failure medications in which Isordil was added to his regimen that already has hydralazine in place which are currently being given..  Based on his overall response to Isordil therapy it would be reasonable to start adding beta-blocker therapy however he is being allowed to have some permissive hypertension.  I have a very low clinical suspicion for an endocarditis source given that he had likely a contaminant in one of his blood cultures.       At this time we will be very conservative adding goal-directed medical therapy to minimize hypotensive episodes that may exacerbate cerebral perfusion.  It is advised that he abstain from further methamphetamine exposure given the strong correlation with toxic cardiomyopathy and likely contributed to his underlying hypertensive emergency    Prognosis is very guarded at this time.  Cardiology service will continue to follow    Critical care time: 35 minutes was utilized in direct face-to-face patient care, document preparation, document review, care coordination with his multi disciplinary team.  Discussion with the nursing staff      Thank you for allowing me to participate in the care of this patient.  I will continue to follow this patient    Please contact me with any questions.    Jesus Nazario D.O.   Cardiologist, [facility]  [phone]

## 2024-03-30 NOTE — PROGRESS NOTES
4 Eyes Skin Assessment Completed by RIGOBERTO Rangel and RIGOBERTO Rivers.    Head WDL  Ears Redness, stitches right ear  Nose WDL, NG in place left nare   Mouth WDL  Neck WDL  Breast/Chest WDL  Shoulder Blades WDL  Spine WDL  (R) Arm/Elbow/Hand Redness, Blanching , and Edema, bruising   (L) Arm/Elbow/Hand Redness, Blanching, and Edema, bruising   Abdomen WDL  Groin Redness  Scrotum/Coccyx/Buttocks Non-Blanching DTI right buttocks, redness, sacrum blanching  (R) Leg Redness, Non-blanching  (L) Leg Redness, bruise, Non-blanching   (R) Heel/Foot/Toe Blanching, DTI toes  (L) Heel/Foot/Toe Blanching and Non-Blanching DTI toes          Devices In Places Tele Box, Blood Pressure Cuff, Pulse Ox, SCD's, and OG/NG      Interventions In Place Heel Mepilex, Sacral Mepilex, Heel Float Boots, Q2 Turns, and Low Air Loss Mattress    Possible Skin Injury Yes    Pictures Uploaded Into Epic N/A  Wound Consult Placed Yes  RN Wound Prevention Protocol Ordered Yes

## 2024-03-30 NOTE — CARE PLAN
The patient is Watcher - Medium risk of patient condition declining or worsening    Shift Goals  Clinical Goals: maintain SBP < 150  Patient Goals: POLINA  Family Goals: not present    Progress made toward(s) clinical / shift goals:    Problem: Knowledge Deficit - Standard  Goal: Patient and family/care givers will demonstrate understanding of plan of care, disease process/condition, diagnostic tests and medications  Outcome: Progressing     Problem: Safety - Medical Restraint  Goal: Remains free of injury from restraints (Restraint for Interference with Medical Device)  Outcome: Progressing     Problem: Pain - Standard  Goal: Alleviation of pain or a reduction in pain to the patient’s comfort goal  Outcome: Progressing     Problem: Skin Integrity  Goal: Skin integrity is maintained or improved  Outcome: Progressing     Problem: Fall Risk  Goal: Patient will remain free from falls  Outcome: Progressing     Problem: Hemodynamics  Goal: Patient's hemodynamics, fluid balance and neurologic status will be stable or improve  Outcome: Progressing  Note: Pt received new orders for BP management throughout shift. BP communicated with MD. BP goal SBP < 150     Problem: Respiratory  Goal: Patient will achieve/maintain optimum respiratory ventilation and gas exchange  Outcome: Progressing     Problem: Safety - Medical Restraint  Goal: Remains free of injury from restraints (Restraint for Interference with Medical Device)  3/30/2024 1647 by Juan Carlos Brumfield RNED  Outcome: Progressing  Flowsheets (Taken 3/30/2024 1647)  Addressed this shift: Remains free of injury from restraints (restraint for interference with medical device):   Determine that other, less restrictive measures have been tried or would not be effective before applying the restraint   Evaluate the patient's condition at the time of restraint application   Inform patient/family regarding the reason for restraint   Every 2 hours: Monitor safety, psychosocial status,  comfort, nutrition and hydration  3/30/2024 1643 by Juan Carlos Brumfield R.N.  Outcome: Progressing       Patient is not progressing towards the following goals:

## 2024-03-30 NOTE — PROGRESS NOTES
"  22:42 Pt's BP is 162/75. Pt is maxed on PRN Labetalol IV and Hydralazine IV. Dr. Calixto was notified, and got a new order for Isosorbide 20 mg PO. Per Dr. Calixto, if Pt's Systolic BP is still >150 next hour, will start Diltiazem IV drip.    23:24 Given Isosorbide 20 mg PO to Pt. And will monitor systolic BP closely  .  00:25 Pt's BP is still 162/74. Dr. Calixto was notified. Got the new order for Diltiazem IV drip, Q15 mins BP check.    03:00 Pt's BP is still 159/74. Pt is maxed on Diltiazem IV drip, Dr. Aguilar was notified. MD stated \"That's fine, do not want to lower his BP too much.\"      "

## 2024-03-30 NOTE — THERAPY
Speech Language Therapy Contact Note    Patient Name: Andrews Sanchez  Age:  64 y.o., Sex:  male  Medical Record #: [id number]  Today's Date: 3/30/2024         03/30/24 1038   Treatment Variance   Reason For Missed Therapy Medical - Patient Is Not Medically Stable  RN stated pt's alertness continues to be diminished and that pt was not yet appropriate for evaluation.    Session Information   Date / Session Number note only: 3/28, 3/29, 3/30

## 2024-03-30 NOTE — PROGRESS NOTES
4 Eyes Skin Assessment Completed by RIGOBERTO Jones and RIGOBERTO Martinez.    Head WDL  Ears Redness blanchable left ear laceration sutures in place  Nose WDL  Mouth WDL  Neck WDL  Breast/Chest WDL  Shoulder Blades WDL  Spine WDL  (R) Arm/Elbow/Hand Redness, Blanching, Discoloration, and Edema  (L) Arm/Elbow/Hand Redness, Blanching, Bruising, and Edema  Abdomen Redness and Blanching  Groin Redness, Blanching, and Excoriation  Scrotum/Coccyx/Buttocks Redness, Non-Blanching, and Excoriation  (R) Leg Redness, Non-Blanching, Abrasion, and Edema  (L) Leg Redness, Blanching, Scar, and Edema  (R) Heel/Foot/Toe Redness, Blanching, and Discoloration DTIs toes  (L) Heel/Foot/Toe Redness, Blanching, and Discoloration DTIs toes          Devices In Places ECG, Blood Pressure Cuff, Pulse Ox, Condom Cath, and Oxy Mask      Interventions In Place Sacral Mepilex, Heel Float Boots, TAP System, Pillows, Q2 Turns, Low Air Loss Mattress, Barrier Cream, and ZFlo Pillow    Possible Skin Injury Yes    Pictures Uploaded Into Epic N/A  Wound Consult Placed Yes  RN Wound Prevention Protocol Ordered Yes

## 2024-03-30 NOTE — PROGRESS NOTES
Hospital Medicine Daily Progress Note    Date of Service  3/30/2024    Chief Complaint  Andrews Sanchez is a 63 y.o. male admitted 3/24/2024 after he found down    Hospital Course    63 y.o. male with past medical history of hypertension hypercholesterolemia who presented 3/24/2024 after he found down for an unknown amount of 9 and he was covered in feces.  Patient found to have significant elevated blood pressure and as per the chart review the blood pressure was 226/143 on upon arrival to [facility].  He was diagnosed with hypertensive crisis, hypoxic respiratory failure, acute encephalopathy and acute kidney injury.  On imaging studies patient found to have 1.2 mm left frontal hyperdensity.  He underwent MRI brain that showed diffuse confluent FLAIR.  He was placed on Precedex gtt. for ongoing agitation and also he was placed on nicardipine infusion for uncontrolled hypertension.     On March 26, 2024 intensivist Dr. Carrera requested to transfer care to hospitalist service.    Interval Problem Update    03/27/24    I evaluated and examined him at the bedside.  Blood pressure still running on the higher side.  I added hydralazine.  Due to patient renal functions I did not ACE or ARB's.  He also found to have mild bradycardia and I did not add beta-blocker.  He still requiring Cardizem gtt.  I am continuing and titrating as per target blood pressure of 120-150.  He is still requiring Precedex gtt. for ongoing agitation.  I am continuing and titrating as per RASS of -1 to +1.  I requested consultation from cardiology for PAULINE as recommended by neurology to evaluate for infective endocarditis.  I discussed plan of care with neurologist Dr. Zapata.  I attempted to call patient's daughter but it was the wrong number.  I ordered CTA head and neck as recommended by neurology.  I evaluated multiple times throughout the day.  Plan is to get feeding tube for nutrition as well as for medication.    03/28/24    I have  noted and examined him at the bedside.  He remains critically ill and not following commands but  He underwent feeding tube placement.  I reviewed CT head and neck that did not show any acute normalities per  I called patient's daughter Cheyenne and I discussed plan of care with the and answered all her questions.  He is still requiring significant amount of nicardipine gtt. I am continuing and titrating nicardipine gtt. as per target systolic blood pressure of 120 to 150 mmHg.  I am also continuing titrating Precedex gtt. as per RASS of -1 to +1.  I personally discussed plan of care with neurologist Dr. Zapata.    03/29/24    I evaluated and examined him at the bedside.  He remains confused.  He attempted to pull his feeding tube.  He is in 2 point soft restraints.  Now blood pressure has significantly improved.  He is still on Precedex.  I am continuing and titrating Precedex as per RASS of -1 to +1.  He is still on a Cardizem gtt. plan is to wean him off from nicardipine and try to manage blood pressure with oral antihypertensive as now we have feeding tube.  Current sodium level is 147.  I started him on free water flushes from feeding tube.  I ordered repeat BMP for this afternoon.  I increase the dose of hydralazine.    03/30/24    I evaluated and examined him at the bedside.  He is still remaining confused and not following commands.  Blood pressure still running on the higher side now he was placed on diltiazem gtt. this morning I discontinued diltiazem due to low ejection fraction and I placed him on Cleviprex  Now he is off from Precedex gtt.  Cardiology has been following him.  Later today he was agitated and looks like labored breathing after ABG that did not show any acute normalities.  I also ordered 1 dose of IV morphine due to concern for pain      I have discussed this patient's plan of care and discharge plan at IDT rounds today with Case Management, Nursing, Nursing leadership, and other  members of the IDT team.    Consultants/Specialty  critical care    Code Status  Full Code    Disposition  The patient is not medically cleared for discharge to home or a post-acute facility.      I have placed the appropriate orders for post-discharge needs.    Review of Systems  Review of Systems   Unable to perform ROS: Mental acuity        Physical Exam  Temp:  [36.2 °C (97.2 °F)-37.9 °C (100.3 °F)] 37.2 °C (99 °F)  Pulse:  [64-96] 75  Resp:  [15-50] 46  BP: (119-172)/(56-77) 156/72  SpO2:  [94 %-97 %] 95 %    Physical Exam  Vitals reviewed.   Constitutional:       General: He is in acute distress.      Appearance: He is ill-appearing.      Comments: Soft restraints   HENT:      Head: Normocephalic and atraumatic. No contusion.      Right Ear: External ear normal.      Left Ear: External ear normal.      Nose: Nose normal.      Comments: Feeding tube     Mouth/Throat:      Pharynx: No oropharyngeal exudate.   Eyes:      General:         Right eye: No discharge.         Left eye: No discharge.      Pupils: Pupils are equal, round, and reactive to light.   Cardiovascular:      Rate and Rhythm: Normal rate and regular rhythm.      Heart sounds: No murmur heard.     No friction rub. No gallop.   Pulmonary:      Effort: Pulmonary effort is normal.      Breath sounds: No wheezing or rhonchi.   Abdominal:      General: Bowel sounds are normal. There is no distension.      Palpations: Abdomen is soft.      Tenderness: There is no abdominal tenderness. There is no rebound.   Musculoskeletal:         General: No swelling or tenderness. Normal range of motion.      Cervical back: No rigidity. No muscular tenderness.   Skin:     General: Skin is warm and dry.      Coloration: Skin is not jaundiced.   Neurological:      General: No focal deficit present.      Mental Status: He is alert. He is disoriented.      Cranial Nerves: No cranial nerve deficit.      Sensory: No sensory deficit.      Comments: Not following commands        He remains in distress and no new changes in physical exam.  Fluids    Intake/Output Summary (Last 24 hours) at 3/30/2024 0811  Last data filed at 3/30/2024 0600  Gross per 24 hour   Intake 449.43 ml   Output 790 ml   Net -340.57 ml       Laboratory  Recent Labs     03/28/24  0330 03/29/24  0320 03/30/24  0136   WBC 8.1 11.1* 12.7*   RBC 4.48* 4.50* 4.40*   HEMOGLOBIN 13.4* 13.3* 13.1*   HEMATOCRIT 41.1* 41.9* 40.2*   MCV 91.7 93.1 91.4   MCH 29.9 29.6 29.8   MCHC 32.6 31.7* 32.6   RDW 52.4* 54.5* 54.6*   PLATELETCT 225 259 255   MPV 10.4 10.1 10.2     Recent Labs     03/29/24  0320 03/29/24  1352 03/29/24  2150 03/30/24  0136   SODIUM 147* 149* 150* 148*   POTASSIUM 3.6 3.7  --  3.5*   CHLORIDE 115* 118*  --  117*   CO2 17* 16*  --  18*   GLUCOSE 107* 106*  --  134*   BUN 24* 27*  --  28*   CREATININE 1.67* 1.79*  --  1.66*   CALCIUM 8.5 8.6  --  8.6               Recent Labs     03/28/24  0330   TRIGLYCERIDE 59   HDL 41   LDL 95       Imaging  DX-ABDOMEN FOR TUBE PLACEMENT   Final Result         Gastric drainage tube with tip projecting over the expected area of the first portion duodenum.      DX-ABDOMEN FOR TUBE PLACEMENT   Final Result      The esophagogastric tube placed in the interval is coiled in the back of the patient's throat.      I, Dr. Thanh Brian, discussed the results of this examination directly by phone with Karen FRANKLIN on 3/29/2024 at 1706 hours.      DX-ABDOMEN FOR TUBE PLACEMENT   Final Result      The esophagogastric tube is coiled back upon itself in the distal third of the esophagus. Repositioning is recommended.      DX-ABDOMEN FOR TUBE PLACEMENT   Final Result      1. The nasogastric tube is coiled back upon itself in the distal third of the esophagus; repositioning is recommended.   2. IDr. Thanh, discussed the results of this examination directly by phone with RIGOBERTO Martinez on 3/29/2024 at 1549 hours.      DX-ABDOMEN FOR TUBE PLACEMENT   Final Result         1.   Air-filled distended loops of bowel are seen with reactive mucosal pattern in the upper abdomen, appearance suggests ileus or enteritis. Recommend radiographic followup to resolution to exclude progression to obstruction.   2.  Dobbhoff tube tip overlying the expected location of the pylorus or first duodenal segment.   3.  Cardiomegaly      CT-CTA NECK WITH & W/O-POST PROCESSING   Final Result      1.  No acute neck arterial abnormality detected. No significant arterial stenosis.      CT-CTA HEAD WITH & W/O-POST PROCESS   Final Result      1.  No acute arterial abnormality is detected.   2.  Severe white matter, brainstem and cerebellar hypoattenuation, indeterminate.   3.  2 mm density left frontal lobe suspicious for a small microhemorrhage.   4.  13 x 8 mm indeterminate focus of hypoattenuation in the right cerebellum.      MR-BRAIN-W/O   Final Result      1.  Diffuse confluent FLAIR hyperintense signal abnormality throughout the supratentorial white matter including posterior limbs internal capsules, subinsular white matter, as well as thalamic gray matter. Findings would be atypical and extreme for    chronic microvascular ischemic change. Toxic or metabolic leukoencephalopathy or consequence of other diffuse white matter insult including sequela of narcotic or other substance abuse might be considered. The extremely diffuse involvement would also be    very atypical for posterior reversible encephalopathy syndrome (PRES).   2.  There is also diffuse FLAIR signal involving the brainstem, middle cerebellar peduncles, and cerebellar white matter, presumably the same etiology as the supratentorial process.   3.  Innumerable supratentorial and posterior fossa foci of microhemorrhage compatible with the given history of hypertension. One of these foci probably corresponds to the punctate focus of acute hemorrhagic density in the left frontal deep white matter    seen on the CT scan. This focus shows no significant  surrounding vasogenic edema or mass effect.   4.  11 mm ovoid focus of FLAIR hyperintensity in the right cerebellar deep white matter likely representing encephalomalacia related to old infarct or other remote insult.   5.  Subcentimeter foci x3 of bright diffusion signal involving the left frontal deep white matter, left parietal deep white matter, and left occipital peritrigonal white matter respectively, consistent with acute lacunar size infarcts.      PU-ITZIACD-4 VIEW   Final Result      1.  No evidence of bowel obstruction.   2.  Lumbar fusion hardware and metallic clips. No other metallic foreign body seen.   3.  Severe degenerative changes bilateral hip joints with likely AVN of the bilateral femoral heads.      EC-ECHOCARDIOGRAM COMPLETE W/O CONT   Final Result      CT-HEAD W/O   Final Result   Addendum (preliminary) 1 of 1   VOALTE message of critical findings sent to Dr. Huizar at 3/24/2024 11:37    AM. I also received confirmation of receipt of VOALTE message back from    the provider.      Final      1.  2 mm focus of hyperdensity in the left frontal white matter. This could be a small hemorrhage versus other etiology as above.   2.  Advanced confluent nonspecific white matter hypoattenuation which is markedly abnormal. Recommend follow-up with brain MRI study.      Based solely on CT findings, the brain injury guideline category is mBIG 1.      SDH < 4mm   IPH < 4mm   SAH < 3 sulci and < 1mm      The original BIG retrospective analysis found radiographic progression in 0% of BIG 1 patients and 2.6% BIG 2.      Efforts are underway to contact referring physician with results at time of dictation.      DX-CHEST-PORTABLE (1 VIEW)   Final Result         Hazy bibasilar opacities, left more than right, atelectasis or infection.           Assessment/Plan  * Hypertensive crisis- (present on admission)  Assessment & Plan  3/30/2024   History of HTN, not on home meds per chart review  UDS positive for meth,  possible etiology  I am continuing and titrating nicardipine gtt. plan is to slowly decrease blood pressure, target SBP around 120-150.  Plan is to manage blood pressure with p.o. medications and wean him off from nicardipine gtt.  He was started on Cardizem gtt.  I am continuing Cardizem gtt. as per target systolic blood pressure of less than 150 mmHg.  His EF is low and if his blood pressure improved plan is to discontinue Cardizem gtt. as is not an ideal medication to use in the setting of low EF.  Now he is on p.o. hydralazine and nitro.  Continue to monitor closely in IMCU.    Hypernatremia  Assessment & Plan  Patient found to have hypernatremia.  I started him on free water flushes.  Sodium level has been improving current sodium level is 148.  Continue to monitor.    Ileus (HCC)  Assessment & Plan  X-ray abdomen for tube placement showed air-filled distended loops of bowel are seen with reactive mucosal pattern in the upper abdomen, appearance suggest ileus or enteritis.    Multiple lacunar infarcts (HCC)  Assessment & Plan  3/30/2024   MRI showed acute lacunar size infarcts.  Patient will need Zio patch at the time of discharge.  Ordered HbA1c and lipid profile.  I started him on aspirin and atorvastatin.  I reviewed CT head and neck that did not show any acute abnormalities.  After discussing it with neurology I started him on DVT prophylaxis with Lovenox.  I ordered non-tPA stroke order set.  Continue monitor closely in [facility]    Debility  Assessment & Plan  PT/OT consult requested    Bacteremia due to Gram-negative bacteria  Assessment & Plan  1/2 BC + for GNR  Patient was admitted at [facility] on 9/2022 and 12/2022 for left lower extremity abscess that grew enterobacter, enterococcus, deteriorates   No overt murmurs and TTE was unrevealing  Patient has hardware in his back but no back pain on physical exam  Unclear source  Culture came back as a contaminant and I discontinued antibiotic to avoid adverse  "effects.      Prolonged Q-T interval on ECG  Assessment & Plan  3/30/2024   QTc 503  Continue to monitor colitis and replace as needed.  Avoid QT prolonging agents as able  Continue to monitor closely on telemetry.    Abnormal imaging of central nervous system  Assessment & Plan  CT head w/o contrast revealing 2mm focus hyperdensity in left frontal white matter; vascular neurology evaluated patient in ED and think this is an incidental finding and not likely intraparenchymal hemorrhage  MRI completed on 3/25, multiple findings,   Neurology evaluated him and made recommendations.  Continue to monitor closely on telemetry.  Continue to monitor blood pressure.  I reviewed finding of EEG.  Continue to monitor in IMCU.    Hypophosphatemia  Assessment & Plan  3/30/2024   Continue to monitor and replace as needed.    Hypokalemia  Assessment & Plan  3/30/2024   Continue to monitor and replace as needed.    FRANKLYN (acute kidney injury) (HCC)  Assessment & Plan  3/30/2024   Unclear baseline, possibly 1.0-1.1 however last stable Cr from 2022  UA relatively bland, protein loss noted  Limit nephrotoxins and renally dose as appropriate  Renal functions remain similar.  Renal function slightly improved  Hypokalemia I ordered potassium replacement.  Use potassium with caution due to abnormal renal functions.    Acute metabolic encephalopathy  Assessment & Plan  Unclear etiology, possibly multifactorial in setting of meth use, Wernicke's (history of alcohol use, unclear if current), PRES, acute hypoxic respiratory failure  CT head revealed 2mm focus hyperdensity (vascular neurology evaluated patient in ED and think finding is incidental rather than intraparenchymal hemorrhage)  I reviewed finding of MRI brain.    I discussed plan of care with neurologist.  Due to MRI finding neurologist recommended Zio patch at the time of discharge.  Started on empiric IV thiamine   Continue to monitor closely.  I reviewed EEG that showed \"Diffuse " "slowing of the background, suggestive of diffuse and/or multifocal cerebral dysfunction. This finding might be seen in a myriad of encephalopathies and in itself is a nonspecific finding.\"  He remains confused and encephalopathy due to diffuse brain injury.      Elevated troponin  Assessment & Plan  Possibly related to underlying demand ischemia with decreased renal clearance   Troponins trended  Continue monitor closely on telemetry      HFrEF (heart failure with reduced ejection fraction) ([facility])  Assessment & Plan  3/30/2024   No known history of heart failure, possibly meth induced  Echo (3/24): Per report, mild concentric LVH, EF estimated to be 25-30%   Likely initiation of GDMT once patient  is more stable.  Cardiology has been following him.    Acute hypoxic respiratory failure (HCC)  Assessment & Plan  Unclear if the patient is on oxygen at home, no known history of COPD  Bibasilar opacities L > R, empirically started on Unasyn in the ED but later discontinued as etiology preferred to be pulmonary edema, overall decreasing O2 requirements  Continue monitor closely in IMCU.  Currently is requiring 6 L of oxygen to maintain oxygen saturation.    Positive urine drug screen- (present on admission)  Assessment & Plan  UDS positive for amphetamines, this problem dates back many years with prior positive urine drug screens.    Counseling when appropriate.    Essential hypertension- (present on admission)  Assessment & Plan  Continue p.o. hydralazine and cardiology also started him on nitro.  Now patient has feeding tube so we can use enteral access for blood pressure medications.  I am holding ACE and ARB's due to renal functions and he has mild bradycardia I did not start him on beta-blocker.  I am continuing titrating nicardipine gtt as per target systolic blood pressure of 120-150.  Continue p.o. meds and uptitration so we can wean him off from nicardipine gtt.  Continue to monitor closely.      I discussed plan " of care during multidisciplinary rounds regarding patient's current medical condition and plan of care.    Patient is critically ill.   The patient continues to have: Uncontrolled hypertension  The vital organ system that is affected is the: Cardiovascular system  If untreated there is a high chance of deterioration into: Cardiovascular collapse  And eventually death.   The critical care that I am providing today is: He was placed on Cardizem gtt. that I discontinued this morning and I placed him on Cleviprex gtt. and I am titrating as per target systolic blood pressure.  He continued to have ongoing persistent hypotension despite Cleviprex.  Later this afternoon I also started him on clonidine.  I obtain ABGs due to concern for respiratory distress.  The critical that has been undertaken is medically complex.   There has been no overlap in critical care time.   Critical Care Time not including procedures: 39 minutes           I discussed with neurology regarding pharmacological DVT prophylaxis and Dr. Zapata recommending to hold until we rule out infective endocarditis.  VTE prophylaxis:    enoxaparin ppx      I have performed a physical exam and reviewed and updated ROS and Plan today (3/30/2024). In review of yesterday's note (3/29/2024), there are no changes except as documented above.

## 2024-03-30 NOTE — PROGRESS NOTES
4 Eyes Skin Assessment Completed by RIGOBERTO Villalba and RIGOBERTO Matamoros.    Head WDL  Ears Redness, left ear laceration  Nose WDL, dried blood from multiple NGT  Mouth WDL  Neck WDL  Breast/Chest WDL  Shoulder Blades WDL  Spine WDL  (R) Arm/Elbow/Hand Redness, Blanching, Discoloration, and Edema  (L) Arm/Elbow/Hand Redness, Blanching, Bruising, Discoloration, and Edema  Abdomen Redness and Blanching  Groin Redness and Excoriation  Scrotum/Coccyx/Buttocks Redness, Non-Blanching, and Excoriation  (R) Leg Redness, Non-Blanching, Abrasion, and Edema  (L) Leg Redness, Non-Blanching, and Edema  (R) Heel/Foot/Toe Redness, Blanching, and Discoloration, DTI toes  (L) Heel/Foot/Toe Redness, Blanching, and Discoloration, DTI toes          Devices In Places ECG, Blood Pressure Cuff, Pulse Ox, SCD's, Condom Cath, and Oxy Mask      Interventions In Place Gray Ear Foams, Heel Float Boots, TAP System, Pillows, Q2 Turns, and Low Air Loss Mattress    Possible Skin Injury Yes    Pictures Uploaded Into Epic Yes  Wound Consult Placed Yes  RN Wound Prevention Protocol Ordered Yes

## 2024-03-30 NOTE — CARE PLAN
The patient is Watcher - Medium risk of patient condition declining or worsening    Shift Goals  Clinical Goals: hemodynamic stability  Patient Goals: POLINA  Family Goals: POLINA    Progress made toward(s) clinical / shift goals:    Problem: Knowledge Deficit - Standard  Goal: Patient and family/care givers will demonstrate understanding of plan of care, disease process/condition, diagnostic tests and medications  Outcome: Not Progressing  Note: Pt educated regarding plan of care and medications. Pt lethargic, AMS       Problem: Safety - Medical Restraint  Goal: Remains free of injury from restraints (Restraint for Interference with Medical Device)  Outcome: Progressing  Flowsheets (Taken 3/29/2024 1940)  Addressed this shift: Remains free of injury from restraints (restraint for interference with medical device): Every 2 hours: Monitor safety, psychosocial status, comfort, nutrition and hydration       Patient is not progressing towards the following goals:      Problem: Knowledge Deficit - Standard  Goal: Patient and family/care givers will demonstrate understanding of plan of care, disease process/condition, diagnostic tests and medications  Outcome: Not Progressing  Note: Pt educated regarding plan of care and medications. Pt lethargic, AMS

## 2024-03-31 VITALS
RESPIRATION RATE: 13 BRPM | HEART RATE: 61 BPM | SYSTOLIC BLOOD PRESSURE: 121 MMHG | WEIGHT: 228.84 LBS | DIASTOLIC BLOOD PRESSURE: 66 MMHG | TEMPERATURE: 97.3 F | OXYGEN SATURATION: 97 % | HEIGHT: 70 IN | BODY MASS INDEX: 32.76 KG/M2

## 2024-03-31 LAB
ALBUMIN SERPL BCP-MCNC: 3.1 G/DL (ref 3.2–4.9)
ALBUMIN/GLOB SERPL: 1.1 G/DL
ALP SERPL-CCNC: 82 U/L (ref 30–99)
ALT SERPL-CCNC: 19 U/L (ref 2–50)
ANION GAP SERPL CALC-SCNC: 10 MMOL/L (ref 7–16)
ANION GAP SERPL CALC-SCNC: 11 MMOL/L (ref 7–16)
AST SERPL-CCNC: 21 U/L (ref 12–45)
BILIRUB SERPL-MCNC: 0.4 MG/DL (ref 0.1–1.5)
BUN SERPL-MCNC: 31 MG/DL (ref 8–22)
BUN SERPL-MCNC: 34 MG/DL (ref 8–22)
CALCIUM ALBUM COR SERPL-MCNC: 9.3 MG/DL (ref 8.5–10.5)
CALCIUM SERPL-MCNC: 8.6 MG/DL (ref 8.5–10.5)
CALCIUM SERPL-MCNC: 8.6 MG/DL (ref 8.5–10.5)
CHLORIDE SERPL-SCNC: 119 MMOL/L (ref 96–112)
CHLORIDE SERPL-SCNC: 119 MMOL/L (ref 96–112)
CO2 SERPL-SCNC: 23 MMOL/L (ref 20–33)
CO2 SERPL-SCNC: 23 MMOL/L (ref 20–33)
CREAT SERPL-MCNC: 1.46 MG/DL (ref 0.5–1.4)
CREAT SERPL-MCNC: 1.52 MG/DL (ref 0.5–1.4)
ERYTHROCYTE [DISTWIDTH] IN BLOOD BY AUTOMATED COUNT: 58 FL (ref 35.9–50)
GFR SERPLBLD CREATININE-BSD FMLA CKD-EPI: 50 ML/MIN/1.73 M 2
GFR SERPLBLD CREATININE-BSD FMLA CKD-EPI: 53 ML/MIN/1.73 M 2
GLOBULIN SER CALC-MCNC: 2.9 G/DL (ref 1.9–3.5)
GLUCOSE SERPL-MCNC: 123 MG/DL (ref 65–99)
GLUCOSE SERPL-MCNC: 128 MG/DL (ref 65–99)
HCT VFR BLD AUTO: 39.3 % (ref 42–52)
HGB BLD-MCNC: 12.4 G/DL (ref 14–18)
MCH RBC QN AUTO: 29.8 PG (ref 27–33)
MCHC RBC AUTO-ENTMCNC: 31.6 G/DL (ref 32.3–36.5)
MCV RBC AUTO: 94.5 FL (ref 81.4–97.8)
PHOSPHATE SERPL-MCNC: 4.1 MG/DL (ref 2.5–4.5)
PLATELET # BLD AUTO: 235 K/UL (ref 164–446)
PMV BLD AUTO: 10.8 FL (ref 9–12.9)
POTASSIUM SERPL-SCNC: 4 MMOL/L (ref 3.6–5.5)
POTASSIUM SERPL-SCNC: 4.2 MMOL/L (ref 3.6–5.5)
PROT SERPL-MCNC: 6 G/DL (ref 6–8.2)
RBC # BLD AUTO: 4.16 M/UL (ref 4.7–6.1)
SODIUM SERPL-SCNC: 152 MMOL/L (ref 135–145)
SODIUM SERPL-SCNC: 153 MMOL/L (ref 135–145)
VIT B12 SERPL-MCNC: 1265 PG/ML (ref 211–911)
WBC # BLD AUTO: 12 K/UL (ref 4.8–10.8)

## 2024-03-31 PROCEDURE — 85027 COMPLETE CBC AUTOMATED: CPT

## 2024-03-31 PROCEDURE — 770000 HCHG ROOM/CARE - INTERMEDIATE ICU *

## 2024-03-31 PROCEDURE — 700102 HCHG RX REV CODE 250 W/ 637 OVERRIDE(OP): Performed by: STUDENT IN AN ORGANIZED HEALTH CARE EDUCATION/TRAINING PROGRAM

## 2024-03-31 PROCEDURE — 82607 VITAMIN B-12: CPT

## 2024-03-31 PROCEDURE — 700102 HCHG RX REV CODE 250 W/ 637 OVERRIDE(OP): Performed by: INTERNAL MEDICINE

## 2024-03-31 PROCEDURE — 80048 BASIC METABOLIC PNL TOTAL CA: CPT

## 2024-03-31 PROCEDURE — A9270 NON-COVERED ITEM OR SERVICE: HCPCS | Performed by: INTERNAL MEDICINE

## 2024-03-31 PROCEDURE — A9270 NON-COVERED ITEM OR SERVICE: HCPCS | Performed by: STUDENT IN AN ORGANIZED HEALTH CARE EDUCATION/TRAINING PROGRAM

## 2024-03-31 PROCEDURE — 700111 HCHG RX REV CODE 636 W/ 250 OVERRIDE (IP): Mod: JZ | Performed by: INTERNAL MEDICINE

## 2024-03-31 PROCEDURE — 700111 HCHG RX REV CODE 636 W/ 250 OVERRIDE (IP): Performed by: INTERNAL MEDICINE

## 2024-03-31 PROCEDURE — 99233 SBSQ HOSP IP/OBS HIGH 50: CPT | Performed by: INTERNAL MEDICINE

## 2024-03-31 PROCEDURE — 80053 COMPREHEN METABOLIC PANEL: CPT

## 2024-03-31 PROCEDURE — 84100 ASSAY OF PHOSPHORUS: CPT

## 2024-03-31 PROCEDURE — 83835 ASSAY OF METANEPHRINES: CPT

## 2024-03-31 PROCEDURE — 99232 SBSQ HOSP IP/OBS MODERATE 35: CPT | Performed by: INTERNAL MEDICINE

## 2024-03-31 RX ORDER — MIDAZOLAM HYDROCHLORIDE 1 MG/ML
1 INJECTION INTRAMUSCULAR; INTRAVENOUS ONCE
Status: COMPLETED | OUTPATIENT
Start: 2024-03-31 | End: 2024-03-31

## 2024-03-31 RX ORDER — ENALAPRILAT 1.25 MG/ML
1.25 INJECTION INTRAVENOUS ONCE
Status: COMPLETED | OUTPATIENT
Start: 2024-03-31 | End: 2024-03-31

## 2024-03-31 RX ORDER — AMLODIPINE BESYLATE 5 MG/1
5 TABLET ORAL
Status: DISCONTINUED | OUTPATIENT
Start: 2024-03-31 | End: 2024-04-02

## 2024-03-31 RX ORDER — OXYCODONE HYDROCHLORIDE 5 MG/1
5 TABLET ORAL EVERY 8 HOURS
Status: COMPLETED | OUTPATIENT
Start: 2024-03-31 | End: 2024-04-01

## 2024-03-31 RX ADMIN — ENOXAPARIN SODIUM 40 MG: 100 INJECTION SUBCUTANEOUS at 17:38

## 2024-03-31 RX ADMIN — CLONIDINE HYDROCHLORIDE 0.2 MG: 0.1 TABLET ORAL at 21:05

## 2024-03-31 RX ADMIN — NYSTATIN: 100000 POWDER TOPICAL at 17:37

## 2024-03-31 RX ADMIN — ASPIRIN 81 MG 81 MG: 81 TABLET ORAL at 05:01

## 2024-03-31 RX ADMIN — HYDRALAZINE HYDROCHLORIDE 50 MG: 50 TABLET ORAL at 05:01

## 2024-03-31 RX ADMIN — CLONIDINE HYDROCHLORIDE 0.2 MG: 0.1 TABLET ORAL at 14:33

## 2024-03-31 RX ADMIN — ENALAPRILAT 1.25 MG: 1.25 INJECTION INTRAVENOUS at 11:22

## 2024-03-31 RX ADMIN — ISOSORBIDE DINITRATE 30 MG: 30 TABLET ORAL at 21:06

## 2024-03-31 RX ADMIN — ISOSORBIDE DINITRATE 30 MG: 30 TABLET ORAL at 14:33

## 2024-03-31 RX ADMIN — LABETALOL HYDROCHLORIDE 10 MG: 5 INJECTION INTRAVENOUS at 09:32

## 2024-03-31 RX ADMIN — HYDRALAZINE HYDROCHLORIDE 20 MG: 20 INJECTION, SOLUTION INTRAMUSCULAR; INTRAVENOUS at 02:35

## 2024-03-31 RX ADMIN — OXYCODONE 5 MG: 5 TABLET ORAL at 21:05

## 2024-03-31 RX ADMIN — OXYCODONE 5 MG: 5 TABLET ORAL at 14:32

## 2024-03-31 RX ADMIN — CLONIDINE HYDROCHLORIDE 0.2 MG: 0.1 TABLET ORAL at 05:01

## 2024-03-31 RX ADMIN — POLYETHYLENE GLYCOL 3350 1 PACKET: 17 POWDER, FOR SOLUTION ORAL at 10:37

## 2024-03-31 RX ADMIN — AMLODIPINE BESYLATE 5 MG: 10 TABLET ORAL at 09:27

## 2024-03-31 RX ADMIN — ISOSORBIDE DINITRATE 30 MG: 30 TABLET ORAL at 05:02

## 2024-03-31 RX ADMIN — MIDAZOLAM HYDROCHLORIDE 1 MG: 1 INJECTION, SOLUTION INTRAMUSCULAR; INTRAVENOUS at 12:22

## 2024-03-31 RX ADMIN — ATORVASTATIN CALCIUM 80 MG: 80 TABLET, FILM COATED ORAL at 17:37

## 2024-03-31 RX ADMIN — HYDRALAZINE HYDROCHLORIDE 50 MG: 50 TABLET ORAL at 14:33

## 2024-03-31 RX ADMIN — LABETALOL HYDROCHLORIDE 10 MG: 5 INJECTION INTRAVENOUS at 04:03

## 2024-03-31 RX ADMIN — NYSTATIN: 100000 POWDER TOPICAL at 05:02

## 2024-03-31 RX ADMIN — DOCUSATE SODIUM 50 MG AND SENNOSIDES 8.6 MG 2 TABLET: 8.6; 5 TABLET, FILM COATED ORAL at 17:37

## 2024-03-31 RX ADMIN — HYDRALAZINE HYDROCHLORIDE 50 MG: 50 TABLET ORAL at 21:05

## 2024-03-31 RX ADMIN — HYDRALAZINE HYDROCHLORIDE 20 MG: 20 INJECTION, SOLUTION INTRAMUSCULAR; INTRAVENOUS at 08:37

## 2024-03-31 RX ADMIN — CARVEDILOL 6.25 MG: 6.25 TABLET, FILM COATED ORAL at 06:15

## 2024-03-31 ASSESSMENT — PAIN DESCRIPTION - PAIN TYPE
TYPE: ACUTE PAIN
TYPE: ACUTE PAIN
TYPE: ACUTE PAIN;CHRONIC PAIN
TYPE: ACUTE PAIN

## 2024-03-31 ASSESSMENT — FIBROSIS 4 INDEX: FIB4 SCORE: 1.35

## 2024-03-31 NOTE — PROGRESS NOTES
Hospital Medicine Daily Progress Note    Date of Service  3/31/2024    Chief Complaint  Andrews Sanchez is a 63 y.o. male admitted 3/24/2024 after he found down    Hospital Course    63 y.o. male with past medical history of hypertension hypercholesterolemia who presented 3/24/2024 after he found down for an unknown amount of 9 and he was covered in feces.  Patient found to have significant elevated blood pressure and as per the chart review the blood pressure was 226/143 on upon arrival to [facility].  He was diagnosed with hypertensive crisis, hypoxic respiratory failure, acute encephalopathy and acute kidney injury.  On imaging studies patient found to have 1.2 mm left frontal hyperdensity.  He underwent MRI brain that showed diffuse confluent FLAIR.  He was placed on Precedex gtt. for ongoing agitation and also he was placed on nicardipine infusion for uncontrolled hypertension.     On March 26, 2024 intensivist Dr. Carrera requested to transfer care to hospitalist service.    Interval Problem Update    03/27/24    I evaluated and examined him at the bedside.  Blood pressure still running on the higher side.  I added hydralazine.  Due to patient renal functions I did not ACE or ARB's.  He also found to have mild bradycardia and I did not add beta-blocker.  He still requiring Cardizem gtt.  I am continuing and titrating as per target blood pressure of 120-150.  He is still requiring Precedex gtt. for ongoing agitation.  I am continuing and titrating as per RASS of -1 to +1.  I requested consultation from cardiology for PAULINE as recommended by neurology to evaluate for infective endocarditis.  I discussed plan of care with neurologist Dr. Zapata.  I attempted to call patient's daughter but it was the wrong number.  I ordered CTA head and neck as recommended by neurology.  I evaluated multiple times throughout the day.  Plan is to get feeding tube for nutrition as well as for medication.    03/28/24    I have  noted and examined him at the bedside.  He remains critically ill and not following commands but  He underwent feeding tube placement.  I reviewed CT head and neck that did not show any acute normalities per  I called patient's daughter Cheyenne and I discussed plan of care with the and answered all her questions.  He is still requiring significant amount of nicardipine gtt. I am continuing and titrating nicardipine gtt. as per target systolic blood pressure of 120 to 150 mmHg.  I am also continuing titrating Precedex gtt. as per RASS of -1 to +1.  I personally discussed plan of care with neurologist Dr. Zapata.    03/29/24    I evaluated and examined him at the bedside.  He remains confused.  He attempted to pull his feeding tube.  He is in 2 point soft restraints.  Now blood pressure has significantly improved.  He is still on Precedex.  I am continuing and titrating Precedex as per RASS of -1 to +1.  He is still on a Cardizem gtt. plan is to wean him off from nicardipine and try to manage blood pressure with oral antihypertensive as now we have feeding tube.  Current sodium level is 147.  I started him on free water flushes from feeding tube.  I ordered repeat BMP for this afternoon.  I increase the dose of hydralazine.    03/30/24    I evaluated and examined him at the bedside.  He is still remaining confused and not following commands.  Blood pressure still running on the higher side now he was placed on diltiazem gtt. this morning I discontinued diltiazem due to low ejection fraction and I placed him on Cleviprex  Now he is off from Precedex gtt.  Cardiology has been following him.  Later today he was agitated and looks like labored breathing after ABG that did not show any acute normalities.  I also ordered 1 dose of IV morphine due to concern for pain    03/31/24    I evaluated and examined him at the bedside.  He remains off-and-on agitated.  I evaluated him multiple times throughout the day.  I  ordered IV Versed and that helped and his blood pressure.  I also scheduled him for pain medication and he does look like he is in pain.  Overall poor prognosis as he is not responding to the treatment and he remains significantly confused due to his significant brain injury and low EF.  Blood pressure has been fluctuating.  Current white blood cell count is 12.0, hemoglobin of 12.4 and platelet count of 235.  Sodium has been increasing current sodium level is 152, renal function slightly improved current BUN is 34 and creatinine of 1.52.  I increased free water flushes and plan is to recheck BMP later today.  I added amlodipine 5 mg today for better blood pressure control.  Patient is on several blood pressure medications and blood pressure still running on the higher side.        I have discussed this patient's plan of care and discharge plan at IDT rounds today with Case Management, Nursing, Nursing leadership, and other members of the IDT team.    Consultants/Specialty  critical care    Code Status  Full Code    Disposition  The patient is not medically cleared for discharge to home or a post-acute facility.      I have placed the appropriate orders for post-discharge needs.    Review of Systems  Review of Systems   Unable to perform ROS: Mental acuity        Physical Exam  Temp:  [36.5 °C (97.7 °F)-37.5 °C (99.5 °F)] 36.9 °C (98.4 °F)  Pulse:  [60-81] 65  Resp:  [12-49] 12  BP: (125-180)/(62-86) 142/76  SpO2:  [94 %-99 %] 98 %    Physical Exam  Vitals reviewed.   Constitutional:       General: He is in acute distress.      Appearance: He is ill-appearing.      Comments: Soft restraints   HENT:      Head: Normocephalic and atraumatic. No contusion.      Right Ear: External ear normal.      Left Ear: External ear normal.      Nose: Nose normal.      Comments: Feeding tube     Mouth/Throat:      Pharynx: No oropharyngeal exudate.   Eyes:      General:         Right eye: No discharge.         Left eye: No  discharge.      Pupils: Pupils are equal, round, and reactive to light.   Cardiovascular:      Rate and Rhythm: Normal rate and regular rhythm.      Heart sounds: No murmur heard.     No friction rub. No gallop.   Pulmonary:      Effort: Pulmonary effort is normal.      Breath sounds: No wheezing or rhonchi.   Abdominal:      General: Bowel sounds are normal. There is no distension.      Palpations: Abdomen is soft.      Tenderness: There is no abdominal tenderness. There is no rebound.   Musculoskeletal:         General: No swelling or tenderness. Normal range of motion.      Cervical back: No rigidity. No muscular tenderness.   Skin:     General: Skin is warm and dry.      Coloration: Skin is not jaundiced.   Neurological:      General: No focal deficit present.      Mental Status: He is alert. He is disoriented.      Cranial Nerves: No cranial nerve deficit.      Sensory: No sensory deficit.      Comments: Not following commands       He remains in distress and no new changes in physical exam.  Fluids    Intake/Output Summary (Last 24 hours) at 3/31/2024 0846  Last data filed at 3/31/2024 0540  Gross per 24 hour   Intake 703.01 ml   Output 1000 ml   Net -296.99 ml       Laboratory  Recent Labs     03/29/24  0320 03/30/24  0136 03/31/24  0520   WBC 11.1* 12.7* 12.0*   RBC 4.50* 4.40* 4.16*   HEMOGLOBIN 13.3* 13.1* 12.4*   HEMATOCRIT 41.9* 40.2* 39.3*   MCV 93.1 91.4 94.5   MCH 29.6 29.8 29.8   MCHC 31.7* 32.6 31.6*   RDW 54.5* 54.6* 58.0*   PLATELETCT 259 255 235   MPV 10.1 10.2 10.8     Recent Labs     03/30/24  0136 03/30/24  1335 03/31/24  0520   SODIUM 148* 148* 152*   POTASSIUM 3.5* 3.7 4.2   CHLORIDE 117* 115* 119*   CO2 18* 20 23   GLUCOSE 134* 136* 123*   BUN 28* 30* 34*   CREATININE 1.66* 1.64* 1.52*   CALCIUM 8.6 8.9 8.6                       Imaging  DX-ABDOMEN FOR TUBE PLACEMENT   Final Result         Gastric drainage tube with tip projecting over the expected area of the first portion duodenum.       DX-ABDOMEN FOR TUBE PLACEMENT   Final Result      The esophagogastric tube placed in the interval is coiled in the back of the patient's throat.      I, Dr. Thanh Brian, discussed the results of this examination directly by phone with Karen FRANKLIN on 3/29/2024 at 1706 hours.      DX-ABDOMEN FOR TUBE PLACEMENT   Final Result      The esophagogastric tube is coiled back upon itself in the distal third of the esophagus. Repositioning is recommended.      DX-ABDOMEN FOR TUBE PLACEMENT   Final Result      1. The nasogastric tube is coiled back upon itself in the distal third of the esophagus; repositioning is recommended.   2. I, Dr. Thanh Brian, discussed the results of this examination directly by phone with RIGOBERTO Martinez on 3/29/2024 at 1549 hours.      DX-ABDOMEN FOR TUBE PLACEMENT   Final Result         1.  Air-filled distended loops of bowel are seen with reactive mucosal pattern in the upper abdomen, appearance suggests ileus or enteritis. Recommend radiographic followup to resolution to exclude progression to obstruction.   2.  Dobbhoff tube tip overlying the expected location of the pylorus or first duodenal segment.   3.  Cardiomegaly      CT-CTA NECK WITH & W/O-POST PROCESSING   Final Result      1.  No acute neck arterial abnormality detected. No significant arterial stenosis.      CT-CTA HEAD WITH & W/O-POST PROCESS   Final Result      1.  No acute arterial abnormality is detected.   2.  Severe white matter, brainstem and cerebellar hypoattenuation, indeterminate.   3.  2 mm density left frontal lobe suspicious for a small microhemorrhage.   4.  13 x 8 mm indeterminate focus of hypoattenuation in the right cerebellum.      MR-BRAIN-W/O   Final Result      1.  Diffuse confluent FLAIR hyperintense signal abnormality throughout the supratentorial white matter including posterior limbs internal capsules, subinsular white matter, as well as thalamic gray matter. Findings would be atypical and extreme  for    chronic microvascular ischemic change. Toxic or metabolic leukoencephalopathy or consequence of other diffuse white matter insult including sequela of narcotic or other substance abuse might be considered. The extremely diffuse involvement would also be    very atypical for posterior reversible encephalopathy syndrome (PRES).   2.  There is also diffuse FLAIR signal involving the brainstem, middle cerebellar peduncles, and cerebellar white matter, presumably the same etiology as the supratentorial process.   3.  Innumerable supratentorial and posterior fossa foci of microhemorrhage compatible with the given history of hypertension. One of these foci probably corresponds to the punctate focus of acute hemorrhagic density in the left frontal deep white matter    seen on the CT scan. This focus shows no significant surrounding vasogenic edema or mass effect.   4.  11 mm ovoid focus of FLAIR hyperintensity in the right cerebellar deep white matter likely representing encephalomalacia related to old infarct or other remote insult.   5.  Subcentimeter foci x3 of bright diffusion signal involving the left frontal deep white matter, left parietal deep white matter, and left occipital peritrigonal white matter respectively, consistent with acute lacunar size infarcts.      [id number] VIEW   Final Result      1.  No evidence of bowel obstruction.   2.  Lumbar fusion hardware and metallic clips. No other metallic foreign body seen.   3.  Severe degenerative changes bilateral hip joints with likely AVN of the bilateral femoral heads.      EC-ECHOCARDIOGRAM COMPLETE W/O CONT   Final Result      CT-HEAD W/O   Final Result   Addendum (preliminary) 1 of 1   VOALTE message of critical findings sent to Dr. Huizar at 3/24/2024 11:37    AM. I also received confirmation of receipt of VOALTE message back from    the provider.      Final      1.  2 mm focus of hyperdensity in the left frontal white matter. This could be a small  hemorrhage versus other etiology as above.   2.  Advanced confluent nonspecific white matter hypoattenuation which is markedly abnormal. Recommend follow-up with brain MRI study.      Based solely on CT findings, the brain injury guideline category is mBIG 1.      SDH < 4mm   IPH < 4mm   SAH < 3 sulci and < 1mm      The original BIG retrospective analysis found radiographic progression in 0% of BIG 1 patients and 2.6% BIG 2.      Efforts are underway to contact referring physician with results at time of dictation.      DX-CHEST-PORTABLE (1 VIEW)   Final Result         Hazy bibasilar opacities, left more than right, atelectasis or infection.      US-RENAL ARTERY DUPLEX COMP    (Results Pending)        Assessment/Plan  * Hypertensive crisis- (present on admission)  Assessment & Plan  3/31/2024   History of HTN, not on home meds per chart review  UDS positive for meth, possible etiology  He was started on Cardizem gtt.  I am continuing Cardizem gtt. as per target systolic blood pressure of less than 150 mmHg.  His EF is low and if his blood pressure improved plan is to discontinue Cardizem gtt. as is not an ideal medication to use in the setting of low EF.  Blood pressure is still not under control.  I discontinued all drip for blood pressure control and I am increasing more oral medication and as needed medication to achieve better blood pressure control.  Now he is on clonidine and I also added amlodipine.  Continue hydralazine 50 mg every 8 hourly.  Continue to monitor closely in IMCU.    Hypernatremia  Assessment & Plan  Patient found to have hypernatremia.  I increase frequency and amount of free water flushes now I ordered every 4 hourly 200 cc of free water flushes.  Current sodium level is 152.  I ordered repeat BMP at 1 PM.  Continue to monitor.    Ileus (HCC)  Assessment & Plan  X-ray abdomen for tube placement showed air-filled distended loops of bowel are seen with reactive mucosal pattern in the upper  abdomen, appearance suggest ileus or enteritis.  He has not had a bowel movement I ordered x-ray abdomen this afternoon.    Multiple lacunar infarcts (HCC)  Assessment & Plan  3/31/2024   MRI showed acute lacunar size infarcts.  Patient will need Zio patch at the time of discharge.  Ordered HbA1c and lipid profile.  I started him on aspirin and atorvastatin.  I reviewed CT head and neck that did not show any acute abnormalities.  After discussing it with neurology I started him on DVT prophylaxis with Lovenox.  I ordered non-tPA stroke order set.  Continue monitor closely in IMCU  He remains agitated.  Overall prognosis is guarded as he has significant brain injury along with that he has severely compromised EF.    Debility  Assessment & Plan  PT/OT consult requested    Bacteremia due to Gram-negative bacteria  Assessment & Plan  1/2 BC + for GNR  Patient was admitted at [facility] on 9/2022 and 12/2022 for left lower extremity abscess that grew enterobacter, enterococcus, deteriorates   No overt murmurs and TTE was unrevealing  Patient has hardware in his back but no back pain on physical exam  Unclear source  Culture came back as a contaminant and I discontinued antibiotic to avoid adverse effects.      Prolonged Q-T interval on ECG  Assessment & Plan  3/31/2024   QTc 503  Continue to monitor colitis and replace as needed.  Avoid QT prolonging agents as able  Continue to monitor closely on telemetry.    Abnormal imaging of central nervous system  Assessment & Plan  CT head w/o contrast revealing 2mm focus hyperdensity in left frontal white matter; vascular neurology evaluated patient in ED and think this is an incidental finding and not likely intraparenchymal hemorrhage  MRI completed on 3/25, multiple findings,   Neurology evaluated him and made recommendations.  Continue to monitor closely on telemetry.  Continue to monitor blood pressure.  I reviewed finding of EEG.  Continue to monitor in IMCU.  Continue  "monitor closely in IMCU.    Hypophosphatemia  Assessment & Plan  3/31/2024   Continue to monitor and replace as needed.    Hypokalemia  Assessment & Plan  3/31/2024   Continue to monitor and replace as needed.    FRANKLYN (acute kidney injury) ([facility])  Assessment & Plan  3/31/2024   Unclear baseline, possibly 1.0-1.1 however last stable Cr from 2022  UA relatively bland, protein loss noted  Limit nephrotoxins and renally dose as appropriate  Renal function slightly improved today.  I will repeat BMP at 1 PM.  Continue to monitor closely.    Acute metabolic encephalopathy  Assessment & Plan  Unclear etiology, possibly multifactorial in setting of meth use, Wernicke's (history of alcohol use, unclear if current), PRES, acute hypoxic respiratory failure  CT head revealed 2mm focus hyperdensity (vascular neurology evaluated patient in ED and think finding is incidental rather than intraparenchymal hemorrhage)  I reviewed finding of MRI brain.    I discussed plan of care with neurologist.  Due to MRI finding neurologist recommended Zio patch at the time of discharge.  Started on empiric IV thiamine   Continue to monitor closely.  I reviewed EEG that showed \"Diffuse slowing of the background, suggestive of diffuse and/or multifocal cerebral dysfunction. This finding might be seen in a myriad of encephalopathies and in itself is a nonspecific finding.\"  Ongoing encephalopathy multifactorial due to hyponatremia and significant brain injury on imaging.    Elevated troponin  Assessment & Plan  Possibly related to underlying demand ischemia with decreased renal clearance   Troponins trended  Continue monitor closely on telemetry      HFrEF (heart failure with reduced ejection fraction) ([facility])  Assessment & Plan  3/31/2024   No known history of heart failure, possibly meth induced  Echo (3/24): Per report, mild concentric LVH, EF estimated to be 25-30%   Likely initiation of GDMT once patient  is more stable.  Cardiology has been " "following him.    Acute hypoxic respiratory failure (HCC)  Assessment & Plan  Unclear if the patient is on oxygen at home, no known history of COPD  Bibasilar opacities L > R, empirically started on Unasyn in the ED but later discontinued as etiology preferred to be pulmonary edema, overall decreasing O2 requirements  Continue monitor closely in IMCU.  Currently is requiring 6 L of oxygen to maintain oxygen saturation.  His oxygen saturation is significantly better is around 98%.  Requested bedside RN to titrate down oxygen as tolerated.    Positive urine drug screen- (present on admission)  Assessment & Plan  UDS positive for amphetamines, this problem dates back many years with prior positive urine drug screens.    Counseling when appropriate.    Essential hypertension- (present on admission)  Assessment & Plan  Blood pressure is poorly controlled please see \"hypertensive crisis\" for details.      I discussed plan of care during multidisciplinary rounds regarding patient's current medical condition and plan of care.      I evaluated him multiple times throughout the day.  He has been having ongoing agitation.        VTE prophylaxis: VTE Selection    I have performed a physical exam and reviewed and updated ROS and Plan today (3/31/2024). In review of yesterday's note (3/30/2024), there are no changes except as documented above.        "

## 2024-03-31 NOTE — PROGRESS NOTES
Pt observed with audible wheezing and increased work of breath. Dr Johnson notified, no new orders at this time. RT rounded on pt

## 2024-03-31 NOTE — PROGRESS NOTES
4 Eyes Skin Assessment Completed by RIGOBERTO dominguez and RIGOBERTO martinez.    Head WDL  Ears Redness  Nose WDL  Mouth WDL  Neck WDL  Breast/Chest WDL  Shoulder Blades WDL  Spine WDL  (R) Arm/Elbow/Hand Redness, Blanching, Bruising, and Edema  (L) Arm/Elbow/Hand Redness, Blanching, Bruising, and Edema  Abdomen WDL  Groin Redness  Scrotum/Coccyx/Buttocks Redness, Non-Blanching, and Discoloration dti right buttocks  (R) Leg Non-Blanching  (L) Leg Non-Blanching and Bruising  (R) Heel/Foot/Toe Blanching dti toes  (L) Heel/Foot/Toe Blanching and Non-Blanching dti toes          Devices In Places Tele Box, Blood Pressure Cuff, Pulse Ox, SCD's, and OG/NG      Interventions In Place Heel Mepilex, Sacral Mepilex, Heel Float Boots, Q2 Turns, and Low Air Loss Mattress    Possible Skin Injury Yes    Pictures Uploaded Into Epic N/A  Wound Consult Placed Yes  RN Wound Prevention Protocol Ordered Yes

## 2024-03-31 NOTE — CARE PLAN
The patient is Unstable - High likelihood or risk of patient condition declining or worsening    Shift Goals  Clinical Goals: maintain SBP<160  Patient Goals: POLINA  Family Goals: not present    Progress made toward(s) clinical / shift goals:  ***    Patient is not progressing towards the following goals:

## 2024-03-31 NOTE — CARE PLAN
The patient is Watcher - Medium risk of patient condition declining or worsening    Shift Goals  Clinical Goals: maintain SBP < 150  Patient Goals: POLINA  Family Goals: not present    Progress made toward(s) clinical / shift goals:  Pt's BP is now 171/39      Problem: Pain - Standard  Goal: Alleviation of pain or a reduction in pain to the patient’s comfort goal  Outcome: Progressing  Note: Pt assessed for pain regularly and medicated PRN per MAR.      Problem: Safety - Medical Restraint  Goal: Remains free of injury from restraints (Restraint for Interference with Medical Device)  Flowsheets (Taken 3/30/2024 1246)  Addressed this shift: Remains free of injury from restraints (restraint for interference with medical device):   Determine that other, less restrictive measures have been tried or would not be effective before applying the restraint   Evaluate the patient's condition at the time of restraint application   Every 2 hours: Monitor safety, psychosocial status, comfort, nutrition and hydration   Inform patient/family regarding the reason for restraint

## 2024-03-31 NOTE — PROGRESS NOTES
Cardiology Follow Up Progress Note    Date of Service  3/31/2024    Attending Physician  CAMILLE Reyes*    Chief Complaint   Altered level of consciousness    ILEANA Sanchez is a 64 y.o. male admitted 3/24/2024 with hypertension, dyslipidemia, muscle disorder presents with altered level consciousness after being found down with a disheveled appearance.    Interim Events  Patient is largely noninteractive when asked questions and is not tracking with his eyes as well as he has in the last 24 hours he is being followed closely by the neurology service and the hospital medicine team.  No acute events per nursing staff.    Review of Systems  Review of Systems    Vital signs in last 24 hours  Temp:  [36.5 °C (97.7 °F)-37.4 °C (99.3 °F)] 37 °C (98.6 °F)  Pulse:  [55-80] 55  Resp:  [12-38] 38  BP: (125-180)/(63-92) 137/67  SpO2:  [94 %-99 %] 98 %    Physical Exam  Physical Exam  Constitutional:       Appearance: He is ill-appearing.   HENT:      Head: Normocephalic and atraumatic.      Mouth/Throat:      Mouth: Mucous membranes are moist.      Pharynx: Oropharynx is clear.   Eyes:      Extraocular Movements: Extraocular movements intact.      Conjunctiva/sclera: Conjunctivae normal.   Cardiovascular:      Rate and Rhythm: Normal rate and regular rhythm.      Pulses: Normal pulses.      Heart sounds: Normal heart sounds. No murmur heard.     No friction rub. No gallop.   Pulmonary:      Effort: Pulmonary effort is normal.      Breath sounds: Normal breath sounds.   Abdominal:      General: Bowel sounds are normal.      Palpations: Abdomen is soft.   Musculoskeletal:         General: Normal range of motion.      Cervical back: Normal range of motion and neck supple.      Right lower leg: No edema.      Left lower leg: No edema.   Skin:     General: Skin is warm and dry.   Neurological:      General: No focal deficit present.      Mental Status: He is oriented to person, place, and time. Mental status is  "at baseline.         Lab Review  Lab Results   Component Value Date/Time    WBC 12.0 (H) 03/31/2024 05:20 AM    RBC 4.16 (L) 03/31/2024 05:20 AM    HEMOGLOBIN 12.4 (L) 03/31/2024 05:20 AM    HEMATOCRIT 39.3 (L) 03/31/2024 05:20 AM    MCV 94.5 03/31/2024 05:20 AM    MCH 29.8 03/31/2024 05:20 AM    MCHC 31.6 (L) 03/31/2024 05:20 AM    MPV 10.8 03/31/2024 05:20 AM      Lab Results   Component Value Date/Time    SODIUM 153 (H) 03/31/2024 01:32 PM    POTASSIUM 4.0 03/31/2024 01:32 PM    CHLORIDE 119 (H) 03/31/2024 01:32 PM    CO2 23 03/31/2024 01:32 PM    GLUCOSE 128 (H) 03/31/2024 01:32 PM    BUN 31 (H) 03/31/2024 01:32 PM    CREATININE 1.46 (H) 03/31/2024 01:32 PM    BUNCREATRAT 20.0 09/07/2022 08:25 AM    BUNCREATRAT 20 07/12/2016 07:20 AM      Lab Results   Component Value Date/Time    ASTSGOT 21 03/31/2024 05:20 AM    ALTSGPT 19 03/31/2024 05:20 AM     Lab Results   Component Value Date/Time    CHOLSTRLTOT 148 03/28/2024 03:30 AM    LDL 95 03/28/2024 03:30 AM    HDL 41 03/28/2024 03:30 AM    TRIGLYCERIDE 59 03/28/2024 03:30 AM    TROPONINT 63 (H) 03/25/2024 04:48 PM       No results for input(s): \"NTPROBNP\" in the last 72 hours.        Assessment/Plan  1.  Altered level of consciousness with unclear etiology suspected multiple cerebral microinfarcts  2.  Hypertensive emergency  3.  Newly diagnosed heart failure with reduced ejection fraction  4.  Positive methamphetamine  5.  Longstanding hypertension with multiple episodes of hypertensive urgency/emergency  6.  Acute kidney injury    Clinically, he is doing very poorly and appears to be in critical condition with waning response to overall vocal command and largely remain nonverbal and very minimal sedation.  I am concerned about his overall mental status and overall recovery given that he has not seen significant improvement.  He is being followed closely by the neurology service as well as the critical care service. He does have newly diagnosed heart failure with " reduced ejection fraction which I suspect is multifactorial stemming from his hypertensive emergency coupled to positive methamphetamine use.  At this time he is still hypertensive and there is some mild use of permissive hypertension at this time.  We will steadily add in appropriate heart failure medications in which Isordil was added to his regimen that already has hydralazine in place which are currently being given.  Regarding his overall neurologic status is difficult to determine if he will be able to tolerate and comprehend his newly diagnosed heart failure with reduced ejection fraction.  I have reservations about his overall prognosis given that is very poor and has not seen significant improvement over the last 3 to 4 days despite being on aggressive medical therapy and subsequent afterload reduction.  Based on his overall response to Isordil therapy it would be reasonable to start adding beta-blocker therapy however he is being allowed to have some permissive hypertension.  I have a very low clinical suspicion for an endocarditis source given that he had likely a contaminant in one of his blood cultures.       At this time we will be very conservative adding goal-directed medical therapy to minimize hypotensive episodes that may exacerbate cerebral perfusion.  It is advised that he abstain from further methamphetamine exposure given the strong correlation with toxic cardiomyopathy and likely contributed to his underlying hypertensive emergency    Prognosis is very guarded at this time.  Cardiology service will continue to follow    Critical care time: 35 minutes was utilized in direct face-to-face patient care, document preparation, document review, care coordination with his multi disciplinary team.  Discussion with the nursing staff      Thank you for allowing me to participate in the care of this patient.  At this time the cardiology service will sign off.  If you have further questions please feel  free to contact us we will be happy to reengage but I feel it would be paramount to the patient's care that he be neurologically more engaged given that he does appear to continue to have ongoing encephalopathy prior to continuing to focus in on goal-directed medical therapy for his heart failure with reduced ejection fraction    Please contact me with any questions.    Jesus Nazario D.O.   Cardiologist, [facility]  [phone]

## 2024-03-31 NOTE — PROGRESS NOTES
4 Eyes Skin Assessment Completed by RIGOBERTO Rangel and RIGOBERTO Rivers.    Head WDL  Ears Redness, stitches right ear  Nose WDL  Mouth WDL  Neck WDL  Breast/Chest WDL  Shoulder Blades WDL  Spine WDL  (R) Arm/Elbow/Hand Redness, Blanching, Discoloration, and Edema  (L) Arm/Elbow/Hand Redness, Blanching, Discoloration, and Edema  Abdomen Bruising  Groin Redness  Scrotum/Coccyx/Buttocks Redness and Non-Blanching  (R) Leg Non-Blanching, discoloration  (L) Leg Redness, Non-Blanching, and Scar, discoloration  (R) Heel/Foot/Toe Redness, Non-Blanching, and Discoloration, DTI toes  (L) Heel/Foot/Toe Redness, Non-Blanching, and Discoloration, DTI toe          Devices In Places Tele Box, Blood Pressure Cuff, Pulse Ox, and SCD's      Interventions In Place TAP System, Pillows, Q2 Turns, and Low Air Loss Mattress    Possible Skin Injury Yes    Pictures Uploaded Into Epic Yes  Wound Consult Placed Yes  RN Wound Prevention Protocol Ordered Yes

## 2024-03-31 NOTE — CARE PLAN
The patient is Watcher - Medium risk of patient condition declining or worsening    Shift Goals  Clinical Goals: monitor bp  Patient Goals: genesis  Family Goals: genesis    Progress made toward(s) clinical / shift goals:  none    Patient is not progressing towards the following goals:no improvement in pts ms.    Problem: Knowledge Deficit - Standard  Goal: Patient and family/care givers will demonstrate understanding of plan of care, disease process/condition, diagnostic tests and medications  Outcome: Progressing     Problem: Safety - Medical Restraint  Goal: Remains free of injury from restraints (Restraint for Interference with Medical Device)  Outcome: Progressing  Goal: Free from restraint(s) (Restraint for Interference with Medical Device)  Outcome: Progressing     Problem: Pain - Standard  Goal: Alleviation of pain or a reduction in pain to the patient’s comfort goal  Outcome: Progressing

## 2024-04-01 LAB
ALBUMIN SERPL BCP-MCNC: 3 G/DL (ref 3.2–4.9)
ALBUMIN/GLOB SERPL: 1 G/DL
ALP SERPL-CCNC: 78 U/L (ref 30–99)
ALT SERPL-CCNC: 14 U/L (ref 2–50)
ANION GAP SERPL CALC-SCNC: 10 MMOL/L (ref 7–16)
ANION GAP SERPL CALC-SCNC: 8 MMOL/L (ref 7–16)
AST SERPL-CCNC: 22 U/L (ref 12–45)
BACTERIA BLD CULT: NORMAL
BACTERIA BLD CULT: NORMAL
BILIRUB SERPL-MCNC: 0.4 MG/DL (ref 0.1–1.5)
BUN SERPL-MCNC: 35 MG/DL (ref 8–22)
BUN SERPL-MCNC: 35 MG/DL (ref 8–22)
CALCIUM ALBUM COR SERPL-MCNC: 9.4 MG/DL (ref 8.5–10.5)
CALCIUM SERPL-MCNC: 8.6 MG/DL (ref 8.5–10.5)
CALCIUM SERPL-MCNC: 8.6 MG/DL (ref 8.5–10.5)
CHLORIDE SERPL-SCNC: 119 MMOL/L (ref 96–112)
CHLORIDE SERPL-SCNC: 119 MMOL/L (ref 96–112)
CO2 SERPL-SCNC: 23 MMOL/L (ref 20–33)
CO2 SERPL-SCNC: 25 MMOL/L (ref 20–33)
CREAT SERPL-MCNC: 1.21 MG/DL (ref 0.5–1.4)
CREAT SERPL-MCNC: 1.32 MG/DL (ref 0.5–1.4)
CRP SERPL HS-MCNC: 4.61 MG/DL (ref 0–0.75)
ERYTHROCYTE [DISTWIDTH] IN BLOOD BY AUTOMATED COUNT: 57.7 FL (ref 35.9–50)
GFR SERPLBLD CREATININE-BSD FMLA CKD-EPI: 59 ML/MIN/1.73 M 2
GFR SERPLBLD CREATININE-BSD FMLA CKD-EPI: 66 ML/MIN/1.73 M 2
GLOBULIN SER CALC-MCNC: 2.9 G/DL (ref 1.9–3.5)
GLUCOSE SERPL-MCNC: 102 MG/DL (ref 65–99)
GLUCOSE SERPL-MCNC: 125 MG/DL (ref 65–99)
HCT VFR BLD AUTO: 41.1 % (ref 42–52)
HGB BLD-MCNC: 13.1 G/DL (ref 14–18)
MCH RBC QN AUTO: 30.1 PG (ref 27–33)
MCHC RBC AUTO-ENTMCNC: 31.9 G/DL (ref 32.3–36.5)
MCV RBC AUTO: 94.5 FL (ref 81.4–97.8)
PHOSPHATE SERPL-MCNC: 3 MG/DL (ref 2.5–4.5)
PLATELET # BLD AUTO: 244 K/UL (ref 164–446)
PMV BLD AUTO: 10.9 FL (ref 9–12.9)
POTASSIUM SERPL-SCNC: 4.2 MMOL/L (ref 3.6–5.5)
POTASSIUM SERPL-SCNC: 4.2 MMOL/L (ref 3.6–5.5)
PREALB SERPL-MCNC: 9.1 MG/DL (ref 18–38)
PROT SERPL-MCNC: 5.9 G/DL (ref 6–8.2)
RBC # BLD AUTO: 4.35 M/UL (ref 4.7–6.1)
SIGNIFICANT IND 70042: NORMAL
SIGNIFICANT IND 70042: NORMAL
SITE SITE: NORMAL
SITE SITE: NORMAL
SODIUM SERPL-SCNC: 152 MMOL/L (ref 135–145)
SODIUM SERPL-SCNC: 152 MMOL/L (ref 135–145)
SOURCE SOURCE: NORMAL
SOURCE SOURCE: NORMAL
WBC # BLD AUTO: 10.6 K/UL (ref 4.8–10.8)

## 2024-04-01 PROCEDURE — 770020 HCHG ROOM/CARE - TELE (206)

## 2024-04-01 PROCEDURE — 99233 SBSQ HOSP IP/OBS HIGH 50: CPT | Mod: 25 | Performed by: INTERNAL MEDICINE

## 2024-04-01 PROCEDURE — 80053 COMPREHEN METABOLIC PANEL: CPT

## 2024-04-01 PROCEDURE — 84134 ASSAY OF PREALBUMIN: CPT

## 2024-04-01 PROCEDURE — 700102 HCHG RX REV CODE 250 W/ 637 OVERRIDE(OP): Performed by: INTERNAL MEDICINE

## 2024-04-01 PROCEDURE — 84100 ASSAY OF PHOSPHORUS: CPT

## 2024-04-01 PROCEDURE — 80048 BASIC METABOLIC PNL TOTAL CA: CPT

## 2024-04-01 PROCEDURE — A9270 NON-COVERED ITEM OR SERVICE: HCPCS | Performed by: STUDENT IN AN ORGANIZED HEALTH CARE EDUCATION/TRAINING PROGRAM

## 2024-04-01 PROCEDURE — 700102 HCHG RX REV CODE 250 W/ 637 OVERRIDE(OP): Performed by: STUDENT IN AN ORGANIZED HEALTH CARE EDUCATION/TRAINING PROGRAM

## 2024-04-01 PROCEDURE — 86140 C-REACTIVE PROTEIN: CPT

## 2024-04-01 PROCEDURE — 700111 HCHG RX REV CODE 636 W/ 250 OVERRIDE (IP): Mod: JZ | Performed by: INTERNAL MEDICINE

## 2024-04-01 PROCEDURE — A9270 NON-COVERED ITEM OR SERVICE: HCPCS | Performed by: INTERNAL MEDICINE

## 2024-04-01 PROCEDURE — 85027 COMPLETE CBC AUTOMATED: CPT

## 2024-04-01 PROCEDURE — 99497 ADVNCD CARE PLAN 30 MIN: CPT | Performed by: INTERNAL MEDICINE

## 2024-04-01 RX ORDER — OXYCODONE HYDROCHLORIDE 5 MG/1
5 TABLET ORAL EVERY 8 HOURS
Status: DISCONTINUED | OUTPATIENT
Start: 2024-04-01 | End: 2024-04-01

## 2024-04-01 RX ORDER — OXYCODONE HYDROCHLORIDE 5 MG/1
5 TABLET ORAL EVERY 8 HOURS
Status: DISCONTINUED | OUTPATIENT
Start: 2024-04-01 | End: 2024-04-03

## 2024-04-01 RX ADMIN — HYDRALAZINE HYDROCHLORIDE 50 MG: 50 TABLET ORAL at 05:02

## 2024-04-01 RX ADMIN — HYDRALAZINE HYDROCHLORIDE 50 MG: 50 TABLET ORAL at 21:08

## 2024-04-01 RX ADMIN — CLONIDINE HYDROCHLORIDE 0.2 MG: 0.1 TABLET ORAL at 14:07

## 2024-04-01 RX ADMIN — CLONIDINE HYDROCHLORIDE 0.2 MG: 0.1 TABLET ORAL at 05:02

## 2024-04-01 RX ADMIN — ISOSORBIDE DINITRATE 30 MG: 30 TABLET ORAL at 14:08

## 2024-04-01 RX ADMIN — ASPIRIN 81 MG 81 MG: 81 TABLET ORAL at 05:02

## 2024-04-01 RX ADMIN — ATORVASTATIN CALCIUM 80 MG: 80 TABLET, FILM COATED ORAL at 17:59

## 2024-04-01 RX ADMIN — OXYCODONE 5 MG: 5 TABLET ORAL at 14:07

## 2024-04-01 RX ADMIN — DOCUSATE SODIUM 50 MG AND SENNOSIDES 8.6 MG 2 TABLET: 8.6; 5 TABLET, FILM COATED ORAL at 18:00

## 2024-04-01 RX ADMIN — OXYCODONE 5 MG: 5 TABLET ORAL at 08:03

## 2024-04-01 RX ADMIN — CARVEDILOL 6.25 MG: 6.25 TABLET, FILM COATED ORAL at 08:00

## 2024-04-01 RX ADMIN — AMLODIPINE BESYLATE 5 MG: 10 TABLET ORAL at 05:02

## 2024-04-01 RX ADMIN — HYDRALAZINE HYDROCHLORIDE 50 MG: 50 TABLET ORAL at 14:07

## 2024-04-01 RX ADMIN — NYSTATIN: 100000 POWDER TOPICAL at 18:08

## 2024-04-01 RX ADMIN — CLONIDINE HYDROCHLORIDE 0.2 MG: 0.1 TABLET ORAL at 21:08

## 2024-04-01 RX ADMIN — ENOXAPARIN SODIUM 40 MG: 100 INJECTION SUBCUTANEOUS at 18:00

## 2024-04-01 RX ADMIN — OXYCODONE 5 MG: 5 TABLET ORAL at 21:08

## 2024-04-01 RX ADMIN — OXYCODONE 5 MG: 5 TABLET ORAL at 05:02

## 2024-04-01 RX ADMIN — CARVEDILOL 6.25 MG: 6.25 TABLET, FILM COATED ORAL at 17:59

## 2024-04-01 RX ADMIN — NYSTATIN: 100000 POWDER TOPICAL at 05:02

## 2024-04-01 RX ADMIN — ISOSORBIDE DINITRATE 30 MG: 30 TABLET ORAL at 05:03

## 2024-04-01 ASSESSMENT — PAIN DESCRIPTION - PAIN TYPE
TYPE: ACUTE PAIN

## 2024-04-01 NOTE — PROGRESS NOTES
4 Eyes Skin Assessment Completed by RIGOBERTO dominguez and RIGOBERTO martinez.    Head WDL  Ears Redness  Nose WDL  Mouth WDL  Neck WDL  Breast/Chest WDL  Shoulder Blades WDL  Spine WDL  (R) Arm/Elbow/Hand Redness, Blanching, Discoloration, and Edema  (L) Arm/Elbow/Hand Redness, Blanching, Discoloration, and Edema  Abdomen Bruising  Groin Redness  Scrotum/Coccyx/Buttocks Redness and Non-Blanching  (R) Leg Non-Blanching  (L) Leg Redness and Non-Blanching  (R) Heel/Foot/Toe Redness, Non-Blanching, and Discoloration  (L) Heel/Foot/Toe Redness, Non-Blanching, and Discoloration          Devices In Places Tele Box, Blood Pressure Cuff, Pulse Ox, SCD's, and Oxy Mask      Interventions In Place TAP System, Pillows, Q2 Turns, and Low Air Loss Mattress    Possible Skin Injury Yes    Pictures Uploaded Into Epic Yes  Wound Consult Placed Yes  RN Wound Prevention Protocol Ordered Yes

## 2024-04-01 NOTE — DISCHARGE PLANNING
Case Management Discharge Planning    Admission Date: 3/24/2024  GMLOS: 3  ALOS: 8    6-Clicks ADL Score: 6  6-Clicks Mobility Score: 6  PT and/or OT Eval ordered: Yes  Post-acute Referrals Ordered: NA  Post-acute Choice Obtained: NA  Has referral(s) been sent to post-acute provider:  NA    Anticipated Discharge Dispo: Discharge Disposition: D/T to SNF with Medicare cert in anticipation of skilled care (03)    DME Needed: No    Action(s) Taken:     Pt discussed during IDT rounds. Pt is currently confused and on bilat wrist restraints, 3L oxymask, off precedex drip, and on tube feeds. Palliative consult placed by MD and chai to transfer to telemetry floor.     [facility]-not currently a candidate for IRF due to his current functional status and lack of dc support.   Pending PT/OT/SLP    Escalations Completed: None    Medically Clear: No    Next Steps: CM to assist with discharge needs, possible placement, awaiting MC    Barriers to Discharge: Medical clearance    Is the patient up for discharge tomorrow: No

## 2024-04-01 NOTE — CARE PLAN
The patient is Watcher - Medium risk of patient condition declining or worsening    Shift Goals  Clinical Goals: SBP < 160  Patient Goals: genesis  Family Goals: genesis    Progress made toward(s) clinical / shift goals:        Problem: Safety - Medical Restraint  Goal: Remains free of injury from restraints (Restraint for Interference with Medical Device)  Outcome: Progressing  Flowsheets (Taken 4/1/2024 1211)  Addressed this shift: Remains free of injury from restraints (restraint for interference with medical device):   Determine that other, less restrictive measures have been tried or would not be effective before applying the restraint   Evaluate the patient's condition at the time of restraint application   Inform patient/family regarding the reason for restraint   Every 2 hours: Monitor safety, psychosocial status, comfort, nutrition and hydration     Problem: Pain - Standard  Goal: Alleviation of pain or a reduction in pain to the patient’s comfort goal  Outcome: Progressing  Note: Pt assessed for pain regularly and medicated PRN per MAR.      Problem: Skin Integrity  Goal: Skin integrity is maintained or improved  Outcome: Progressing  Note: Pt turned and positioned every two hours. Incontinence care provided and barrier paste applied as needed.

## 2024-04-01 NOTE — PROGRESS NOTES
4 Eyes Skin Assessment Completed by RIGOBERTO Rivers and RIGOBERTO Thapa.    Head WDL  Ears Redness stitches to Right ear  Nose WDL NG in place left nare   Mouth WDL  Neck WDL  Breast/Chest WDL  Shoulder Blades WDL  Spine WDL  (R) Arm/Elbow/Hand Redness, Blanching, Bruising, and Edema  (L) Arm/Elbow/Hand Redness, Blanching, Bruising, and Edema  Abdomen WDL  Groin Redness  Scrotum/Coccyx/Buttocks Non-Blanching DTI on sacrum redness, sacrum blanching  (R) Leg Redness and Non-Blanching  (L) Leg Redness and Non-Blanching  (R) Heel/Foot/Toe Blanching DTI toes  (L) Heel/Foot/Toe Non-Blanching DTI toes          Devices In Places ECG, Tele Box, Blood Pressure Cuff, and SCD's      Interventions In Place NC Cheek Stickers, Heel Float Boots, Pillows, Q2 Turns, and Low Air Loss Mattress    Possible Skin Injury Yes    Pictures Uploaded Into Epic N/A  Wound Consult Placed Yes  RN Wound Prevention Protocol Ordered Yes

## 2024-04-01 NOTE — DIETARY
Nutrition Services Brief Update:    Problem: Nutritional:  Goal: Nutrition support tolerated and meeting greater than 85% of estimated needs  Outcome: Met    TF running at goal of 50 ml/hr.     RD following.

## 2024-04-01 NOTE — THERAPY
Physical Therapy Contact Note    Patient Name: Andrews Sanchez  Age:  64 y.o., Sex:  male  Medical Record #: [id number]  Today's Date: 4/1/2024 04/01/24 1038   Interdisciplinary Plan of Care Collaboration   IDT Collaboration with  Nursing   Collaboration Comments Per RN, continue to hold therapy. Will continue to monitor EMR and eval when appropriate.

## 2024-04-01 NOTE — THERAPY
Speech Language Therapy Contact Note    Patient Name: Andrews Sanchez  Age:  64 y.o., Sex:  male  Medical Record #: [id number]  Today's Date: 4/1/2024 04/01/24 0801   Treatment Variance   Reason For Missed Therapy Medical - Patient Is Not Medically Stable   Initial Contact Note    Initial Contact Note  Order Received and Verified, Speech Therapy Evaluation in Progress with Full Report to Follow.   Interdisciplinary Plan of Care Collaboration   IDT Collaboration with  Nursing   Collaboration Comments Per RN, pt not appropriate for CSE at this time. SLP to follow as pt is more appropriate.

## 2024-04-01 NOTE — CARE PLAN
The patient is Watcher - Medium risk of patient condition declining or worsening    Shift Goals  Clinical Goals: monitor bp sbp <160  Patient Goals: genesis  Family Goals: genesis    Progress made toward(s) clinical / shift goals:  none    Patient is not progressing towards the following goals:pt's mental status not improving    Problem: Knowledge Deficit - Standard  Goal: Patient and family/care givers will demonstrate understanding of plan of care, disease process/condition, diagnostic tests and medications  Outcome: Progressing     Problem: Safety - Medical Restraint  Goal: Remains free of injury from restraints (Restraint for Interference with Medical Device)  Outcome: Progressing  Goal: Free from restraint(s) (Restraint for Interference with Medical Device)  Outcome: Progressing     Problem: Pain - Standard  Goal: Alleviation of pain or a reduction in pain to the patient’s comfort goal  Outcome: Progressing

## 2024-04-02 PROBLEM — Z71.89 ADVANCE CARE PLANNING: Status: ACTIVE | Noted: 2024-01-01

## 2024-04-02 LAB
ALBUMIN SERPL BCP-MCNC: 3.1 G/DL (ref 3.2–4.9)
ALBUMIN/GLOB SERPL: 0.9 G/DL
ALP SERPL-CCNC: 86 U/L (ref 30–99)
ALT SERPL-CCNC: 19 U/L (ref 2–50)
ANION GAP SERPL CALC-SCNC: 12 MMOL/L (ref 7–16)
AST SERPL-CCNC: 25 U/L (ref 12–45)
BACTERIA BLD CULT: NORMAL
BACTERIA BLD CULT: NORMAL
BILIRUB SERPL-MCNC: 0.4 MG/DL (ref 0.1–1.5)
BUN SERPL-MCNC: 35 MG/DL (ref 8–22)
CALCIUM ALBUM COR SERPL-MCNC: 9.7 MG/DL (ref 8.5–10.5)
CALCIUM SERPL-MCNC: 9 MG/DL (ref 8.5–10.5)
CHLORIDE SERPL-SCNC: 117 MMOL/L (ref 96–112)
CO2 SERPL-SCNC: 23 MMOL/L (ref 20–33)
CREAT SERPL-MCNC: 1.25 MG/DL (ref 0.5–1.4)
ERYTHROCYTE [DISTWIDTH] IN BLOOD BY AUTOMATED COUNT: 57.3 FL (ref 35.9–50)
GFR SERPLBLD CREATININE-BSD FMLA CKD-EPI: 64 ML/MIN/1.73 M 2
GLOBULIN SER CALC-MCNC: 3.3 G/DL (ref 1.9–3.5)
GLUCOSE SERPL-MCNC: 93 MG/DL (ref 65–99)
HCT VFR BLD AUTO: 42.7 % (ref 42–52)
HGB BLD-MCNC: 13.5 G/DL (ref 14–18)
MAGNESIUM SERPL-MCNC: 2.4 MG/DL (ref 1.5–2.5)
MCH RBC QN AUTO: 29.7 PG (ref 27–33)
MCHC RBC AUTO-ENTMCNC: 31.6 G/DL (ref 32.3–36.5)
MCV RBC AUTO: 93.8 FL (ref 81.4–97.8)
PHOSPHATE SERPL-MCNC: 2.4 MG/DL (ref 2.5–4.5)
PLATELET # BLD AUTO: 250 K/UL (ref 164–446)
PMV BLD AUTO: 11.6 FL (ref 9–12.9)
POTASSIUM SERPL-SCNC: 4.2 MMOL/L (ref 3.6–5.5)
PROT SERPL-MCNC: 6.4 G/DL (ref 6–8.2)
RBC # BLD AUTO: 4.55 M/UL (ref 4.7–6.1)
SIGNIFICANT IND 70042: NORMAL
SIGNIFICANT IND 70042: NORMAL
SITE SITE: NORMAL
SITE SITE: NORMAL
SODIUM SERPL-SCNC: 152 MMOL/L (ref 135–145)
SOURCE SOURCE: NORMAL
SOURCE SOURCE: NORMAL
WBC # BLD AUTO: 10.4 K/UL (ref 4.8–10.8)

## 2024-04-02 PROCEDURE — 97167 OT EVAL HIGH COMPLEX 60 MIN: CPT

## 2024-04-02 PROCEDURE — A9270 NON-COVERED ITEM OR SERVICE: HCPCS | Performed by: INTERNAL MEDICINE

## 2024-04-02 PROCEDURE — 92610 EVALUATE SWALLOWING FUNCTION: CPT

## 2024-04-02 PROCEDURE — 84100 ASSAY OF PHOSPHORUS: CPT

## 2024-04-02 PROCEDURE — 80053 COMPREHEN METABOLIC PANEL: CPT

## 2024-04-02 PROCEDURE — 97163 PT EVAL HIGH COMPLEX 45 MIN: CPT

## 2024-04-02 PROCEDURE — 99221 1ST HOSP IP/OBS SF/LOW 40: CPT | Performed by: NURSE PRACTITIONER

## 2024-04-02 PROCEDURE — 700111 HCHG RX REV CODE 636 W/ 250 OVERRIDE (IP): Performed by: INTERNAL MEDICINE

## 2024-04-02 PROCEDURE — 85027 COMPLETE CBC AUTOMATED: CPT

## 2024-04-02 PROCEDURE — 770020 HCHG ROOM/CARE - TELE (206)

## 2024-04-02 PROCEDURE — 83735 ASSAY OF MAGNESIUM: CPT

## 2024-04-02 PROCEDURE — 700111 HCHG RX REV CODE 636 W/ 250 OVERRIDE (IP): Mod: JZ | Performed by: INTERNAL MEDICINE

## 2024-04-02 PROCEDURE — 36415 COLL VENOUS BLD VENIPUNCTURE: CPT

## 2024-04-02 PROCEDURE — 700102 HCHG RX REV CODE 250 W/ 637 OVERRIDE(OP): Performed by: STUDENT IN AN ORGANIZED HEALTH CARE EDUCATION/TRAINING PROGRAM

## 2024-04-02 PROCEDURE — A9270 NON-COVERED ITEM OR SERVICE: HCPCS | Performed by: STUDENT IN AN ORGANIZED HEALTH CARE EDUCATION/TRAINING PROGRAM

## 2024-04-02 PROCEDURE — 700105 HCHG RX REV CODE 258: Performed by: INTERNAL MEDICINE

## 2024-04-02 PROCEDURE — 99233 SBSQ HOSP IP/OBS HIGH 50: CPT | Performed by: INTERNAL MEDICINE

## 2024-04-02 PROCEDURE — 700102 HCHG RX REV CODE 250 W/ 637 OVERRIDE(OP): Performed by: INTERNAL MEDICINE

## 2024-04-02 RX ORDER — AMLODIPINE BESYLATE 10 MG/1
10 TABLET ORAL
Status: DISCONTINUED | OUTPATIENT
Start: 2024-04-03 | End: 2024-04-03

## 2024-04-02 RX ORDER — DEXTROSE MONOHYDRATE 50 MG/ML
INJECTION, SOLUTION INTRAVENOUS CONTINUOUS
Status: DISCONTINUED | OUTPATIENT
Start: 2024-04-02 | End: 2024-04-03

## 2024-04-02 RX ADMIN — CLONIDINE HYDROCHLORIDE 0.2 MG: 0.1 TABLET ORAL at 22:02

## 2024-04-02 RX ADMIN — HYDRALAZINE HYDROCHLORIDE 50 MG: 50 TABLET ORAL at 06:08

## 2024-04-02 RX ADMIN — CARVEDILOL 6.25 MG: 6.25 TABLET, FILM COATED ORAL at 08:04

## 2024-04-02 RX ADMIN — CLONIDINE HYDROCHLORIDE 0.2 MG: 0.1 TABLET ORAL at 06:09

## 2024-04-02 RX ADMIN — NYSTATIN: 100000 POWDER TOPICAL at 06:09

## 2024-04-02 RX ADMIN — HYDRALAZINE HYDROCHLORIDE 20 MG: 20 INJECTION, SOLUTION INTRAMUSCULAR; INTRAVENOUS at 04:41

## 2024-04-02 RX ADMIN — ENOXAPARIN SODIUM 40 MG: 100 INJECTION SUBCUTANEOUS at 17:00

## 2024-04-02 RX ADMIN — HYDRALAZINE HYDROCHLORIDE 20 MG: 20 INJECTION, SOLUTION INTRAMUSCULAR; INTRAVENOUS at 20:20

## 2024-04-02 RX ADMIN — AMLODIPINE BESYLATE 5 MG: 10 TABLET ORAL at 06:08

## 2024-04-02 RX ADMIN — NYSTATIN: 100000 POWDER TOPICAL at 17:00

## 2024-04-02 RX ADMIN — ISOSORBIDE DINITRATE 30 MG: 30 TABLET ORAL at 06:09

## 2024-04-02 RX ADMIN — OXYCODONE 5 MG: 5 TABLET ORAL at 06:08

## 2024-04-02 RX ADMIN — OXYCODONE 5 MG: 5 TABLET ORAL at 22:02

## 2024-04-02 RX ADMIN — CARVEDILOL 6.25 MG: 6.25 TABLET, FILM COATED ORAL at 17:00

## 2024-04-02 RX ADMIN — ATORVASTATIN CALCIUM 80 MG: 80 TABLET, FILM COATED ORAL at 18:00

## 2024-04-02 RX ADMIN — OXYCODONE 5 MG: 5 TABLET ORAL at 14:10

## 2024-04-02 RX ADMIN — CLONIDINE HYDROCHLORIDE 0.2 MG: 0.1 TABLET ORAL at 14:10

## 2024-04-02 RX ADMIN — HYDRALAZINE HYDROCHLORIDE 20 MG: 20 INJECTION, SOLUTION INTRAMUSCULAR; INTRAVENOUS at 00:01

## 2024-04-02 RX ADMIN — HYDRALAZINE HYDROCHLORIDE 50 MG: 50 TABLET ORAL at 14:10

## 2024-04-02 RX ADMIN — DEXTROSE MONOHYDRATE: 50 INJECTION, SOLUTION INTRAVENOUS at 01:08

## 2024-04-02 RX ADMIN — ISOSORBIDE DINITRATE 30 MG: 30 TABLET ORAL at 22:02

## 2024-04-02 RX ADMIN — HYDRALAZINE HYDROCHLORIDE 50 MG: 50 TABLET ORAL at 22:02

## 2024-04-02 RX ADMIN — ISOSORBIDE DINITRATE 30 MG: 30 TABLET ORAL at 14:10

## 2024-04-02 RX ADMIN — ASPIRIN 81 MG 81 MG: 81 TABLET ORAL at 06:09

## 2024-04-02 ASSESSMENT — COGNITIVE AND FUNCTIONAL STATUS - GENERAL
TOILETING: TOTAL
DAILY ACTIVITIY SCORE: 6
STANDING UP FROM CHAIR USING ARMS: TOTAL
EATING MEALS: TOTAL
MOVING TO AND FROM BED TO CHAIR: TOTAL
SUGGESTED CMS G CODE MODIFIER DAILY ACTIVITY: CN
MOVING FROM LYING ON BACK TO SITTING ON SIDE OF FLAT BED: TOTAL
CLIMB 3 TO 5 STEPS WITH RAILING: TOTAL
DRESSING REGULAR UPPER BODY CLOTHING: TOTAL
WALKING IN HOSPITAL ROOM: TOTAL
SUGGESTED CMS G CODE MODIFIER MOBILITY: CN
PERSONAL GROOMING: TOTAL
HELP NEEDED FOR BATHING: TOTAL
MOBILITY SCORE: 6
TURNING FROM BACK TO SIDE WHILE IN FLAT BAD: TOTAL
DRESSING REGULAR LOWER BODY CLOTHING: TOTAL

## 2024-04-02 ASSESSMENT — FIBROSIS 4 INDEX
FIB4 SCORE: 1.514143843545707655
FIB4 SCORE: 1.514143843545707655

## 2024-04-02 ASSESSMENT — PAIN DESCRIPTION - PAIN TYPE: TYPE: ACUTE PAIN

## 2024-04-02 ASSESSMENT — ACTIVITIES OF DAILY LIVING (ADL): TOILETING: INDEPENDENT

## 2024-04-02 ASSESSMENT — GAIT ASSESSMENTS: GAIT LEVEL OF ASSIST: UNABLE TO PARTICIPATE

## 2024-04-02 NOTE — ASSESSMENT & PLAN NOTE
On April 1, 2024 I called patient's daughter Cheyenne and updated her regarding Mr. Sanchez current medical condition and plan of care.  I discussed with him about advance care planning and CODE STATUS.  I explained that the difference between full code, DNR and comfort care measures.  She expressed that she will discuss it with patient's brother as well who lives in [place].  Palliative care consult requested and currently is pending.  I discussed plan of care with nursing staff and charge RN.    Time spent 16 minutes

## 2024-04-02 NOTE — PROGRESS NOTES
4 Eyes Skin Assessment Completed by RIGOBERTO Vuong and RIGOBERTO Haley.    Head dry and flaky  Ears Redness, sutures R ear  Nose WDL  Mouth dry, thick white substance on tongue   Neck WDL  Breast/Chest WDL  Shoulder Blades WDL  Spine WDL  (R) Arm/Elbow/Hand Redness, Blanching, Bruising, and Edema  (L) Arm/Elbow/Hand Redness, Blanching, Bruising, and Edema  Abdomen Redness and Bruising  Groin Redness  Scrotum/Coccyx/Buttocks Redness, Blanching, and Non-Blanching, skin tear underside  (R) Leg Redness and Non-Blanching  (L) Leg Redness and Non-Blanching  (R) Heel/Foot/Toe Blanching, DTI toes  (L) Heel/Foot/Toe Blanching, DTI toes        Devices In Places Tele Box, BP cuff, pulse ox, SCDs, oxy mask, NG tube (taped)      Interventions In Place InterDry, Heel Mepilex, Sacral Mepilex, Heel Float Boots, TAP System, Pillows, Elbow Mepilex, Q2 Turns, and Barrier Cream    Possible Skin Injury Yes    Pictures Uploaded Into Epic Yes  Wound Consult Placed Yes  RN Wound Prevention Protocol Ordered Yes

## 2024-04-02 NOTE — THERAPY
Physical Therapy   Initial Evaluation     Patient Name: Andrews Sanchez  Age:  64 y.o., Sex:  male  Medical Record #: [id number]  Today's Date: 4/2/2024     Precautions  Precautions: Fall Risk;Swallow Precautions;Nasogastric Tube  Comments: B wrist restraints    Assessment  Patient is 64 y.o. male admitted after being found down covered in feces, HTN emergency, hypoxic respiratory failure, acute encephalopathy and FRANKLYN, found L frontal hyperdensity and diffuse confluent FLAIR. PMHx of hypertension hypercholesterolemia, obesity.     Pt requiring total A for bed mobility and at EOB, unable to communicate, no visual tracking, unable to follow commands, however, was not aggressive and pulling at lines and tolerated sitting up for a few minutes. Recommend placement. Will continue to follow to monitor pt's appropriateness for acute IP PT and address said deficits.     Plan    Physical Therapy Initial Treatment Plan   Treatment Plan : Bed Mobility, Equipment, Gait Training, Neuro Re-Education / Balance, Manual Therapy, Orthotics Training , Self Care / Home Evaluation, Stair Training, Therapeutic Activities, Therapeutic Exercise  Treatment Frequency: 2 Times per Week  Duration: Until Therapy Goals Met    DC Equipment Recommendations: Unable to determine at this time  Discharge Recommendations: Recommend post-acute placement for additional physical therapy services prior to discharge home       Subjective    Pt unable to communicate throughout session     Objective     04/02/24 1301   Vitals   Pulse Oximetry 97 %   O2 (LPM) 3   O2 Delivery Device Oxymask   Pain 0 - 10 Group   Therapist Pain Assessment   (unable to assess, no signs of pain)   Prior Living Situation   Comments Unable to obtain from pt due to cognition. per chart, pt lives alone in [facility] in [place], owns a SPC, does not work is on disability   Prior Level of Functional Mobility   Comments unable to obtain 2/2 impaired cognition. WIll need to clarify.  Assume some level of independence if pt living alone in [facility]   Cognition    Cognition / Consciousness X   Speech/ Communication Unable to Communicate   Orientation Level Not Oriented to Name;Not Oriented to Time;Not Oriented to Place;Not Oriented to Reason   Level of Consciousness Responds to pain   Ability To Follow Commands Unable to Follow 1 Step Commands   Safety Awareness Impaired   New Learning Impaired   Attention Impaired   Sequencing Impaired   Initiation Impaired   Strength Upper Body   Comments noted pt spontaneously moving BUE on his own, however, unable to assess. Unable to follow commands   Passive ROM Lower Body   Passive ROM Lower Body WDL   Active ROM Lower Body    Comments able to kick LLE at EOB (1/4 times upon request), unable on RLE   Strength Lower Body   Comments LLE known at least 3/5 L knee ext. Otherwise unable to assess due to command following   Lower Body Muscle Tone   Comments noted some increased tone into L hip and knee flexion   Coordination Upper Body   Comments impaired BUE   Coordination Lower Body    Comments impaired BLE   Vision   Vision Comments no tracking noted, unable to fixate on object, no blink to threat, occasional delayed blink to clap but not consistent   Balance Assessment   Sitting Balance (Static) Trace +   Sitting Balance (Dynamic) Dependent   Weight Shift Sitting Absent   Comments EOB only with total A at trunk, no noted righting reaction   Bed Mobility    Supine to Sit Total Assist   Sit to Supine Total Assist   Scooting Total Assist   Rolling Total Assist to Rt.;Total Assist to Lt.   Gait Analysis   Gait Level Of Assist Unable to Participate   Functional Mobility   Sit to Stand Unable to Participate   Bed, Chair, Wheelchair Transfer Unable to Participate   Mobility EOB only 2/2 poor EOB balance   6 Clicks Assessment - How much HELP from from another person do you currently need... (If the patient hasn't done an activity recently, how much help from another  person do you think he/she would need if he/she tried?)   Turning from your back to your side while in a flat bed without using bedrails? 1   Moving from lying on your back to sitting on the side of a flat bed without using bedrails? 1   Moving to and from a bed to a chair (including a wheelchair)? 1   Standing up from a chair using your arms (e.g., wheelchair, or bedside chair)? 1   Walking in hospital room? 1   Climbing 3-5 steps with a railing? 1   6 clicks Mobility Score 6   Activity Tolerance   Sitting Edge of Bed 5 min   Short Term Goals    Short Term Goal # 1 Pt will roll L/R with Melissa in 6 visits to prepare for EOB mobility   Short Term Goal # 2 Pt will perform supine <> sit with ModA in 6 visits to improve functional independence   Short Term Goal # 3 Pt will sit EOB with Poor - balance and BUE support for 2 min in 6 visits to prepare for OOB mobility   Short Term Goal # 4 Pt will peform stand with MaxA in 6 visits to initiate OOB mobility   Short Term Goal # 5 Pt will perform squat pivot transfer with MaxA in 6 visits to get in/out of chair   Education Group   Education Provided Role of Physical Therapist   Role of Physical Therapist Patient Response Patient;Explanation;No Learning Evidence   Physical Therapy Initial Treatment Plan    Treatment Plan  Bed Mobility;Equipment;Gait Training;Neuro Re-Education / Balance;Manual Therapy;Orthotics Training ;Self Care / Home Evaluation;Stair Training;Therapeutic Activities;Therapeutic Exercise   Treatment Frequency 2 Times per Week   Duration Until Therapy Goals Met   Problem List    Problems Impaired Bed Mobility;Impaired Transfers;Impaired Ambulation;Functional ROM Deficit;Functional Strength Deficit;Impaired Balance;Impaired Coordination;Impaired Vision;Decreased Activity Tolerance;Safety Awareness Deficits / Cognition;Motor Planning / Sequencing   Anticipated Discharge Equipment and Recommendations   DC Equipment Recommendations Unable to determine at this time    Discharge Recommendations Recommend post-acute placement for additional physical therapy services prior to discharge home   Interdisciplinary Plan of Care Collaboration   IDT Collaboration with  Nursing;Occupational Therapist   Patient Position at End of Therapy In Bed;Bed Alarm On;Call Light within Reach;Wrist Restraints Applied   Collaboration Comments RN updated   Session Information   Date / Session Number  4/2- 1 (1/2, 4/8)  (3/27, 3/28, 3/29, 4/1- attempt/hold)

## 2024-04-02 NOTE — PROGRESS NOTES
0041: provider notified that pt had an emesis episode of tube feeding. Spoke to provider on phone and will hold tube feeds over night and monitor pts O2 levels on 3L oxy mask.     Suctioned pt mouth with rapid nurses since pt sounded like he had left over emesis in the back of his throat.

## 2024-04-02 NOTE — THERAPY
"Speech Language Pathology   Clinical Swallow Evaluation     Patient Name: Andrews Sanchez  AGE:  64 y.o., SEX:  male  Medical Record #: [id number]  Date of Service: 4/2/2024      History of Present Illness    65 y/o admitted on 3/24 for ALOC, head injury, and HTN. Hx of amphetamine use. Not seen by SLP before at [facility].    MRI of brain 3/25: \"Diffuse confluent FLAIR hyperintense signal abnormality throughout the supratentorial white matter including posterior limbs internal capsules, subinsular white matter, as well as thalamic gray matter. Findings would be atypical and extreme for   chronic microvascular ischemic change. Toxic or metabolic leukoencephalopathy or consequence of other diffuse white matter insult including sequela of narcotic or other substance abuse might be considered. The extremely diffuse involvement would also be very atypical for posterior reversible encephalopathy syndrome (PRES).  2.  There is also diffuse FLAIR signal involving the brainstem, middle cerebellar peduncles, and cerebellar white matter, presumably the same etiology as the supratentorial process.  3.  Innumerable supratentorial and posterior fossa foci of microhemorrhage compatible with the given history of hypertension. One of these foci probably corresponds to the punctate focus of acute hemorrhagic density in the left frontal deep white matter   seen on the CT scan. This focus shows no significant surrounding vasogenic edema or mass effect.  4.  11 mm ovoid focus of FLAIR hyperintensity in the right cerebellar deep white matter likely representing encephalomalacia related to old infarct or other remote insult.  5.  Subcentimeter foci x3 of bright diffusion signal involving the left frontal deep white matter, left parietal deep white matter, and left occipital peritrigonal white matter respectively, consistent with acute lacunar size infarcts.\"      General Information:  Vitals  O2 (LPM): 3  O2 Delivery Device: Oxymask  Level " of Consciousness: Drowsy  Patient Behaviors: Drowsy, Confused  Orientation: Disoriented x 4  Follows Directives: No      Prior Living Situation & Level of Function:  Comments: pt unable to provide history regarding PLOF  Communication: unclear PLOF  Swallowing: unclear PLOF       Oral Mechanism Evaluation:  Dentition: Pt did not follow commands to assess   Facial Symmetry: Pt did not follow commands to assess  Labial Observations: Open mouth posture   Lingual Observations: Pt did not follow commands to assess  Motor Speech: non-verbal            Laryngeal Function:  Secretion Management: Excess secretions (inside oxymask)  Voice Quality: Pt did not follow commands to assess (pt nonverbal)         Subjective  Pt's eyes were open for entirety of the session, however, unable to follow directives and did not vocalize at any point during session.        Assessment  Current Method of Nutrition: NGT  Positioning: Hernandez's (60-90 degrees)  Bolus Administration: SLP  O2 (LPM): 3 O2 Delivery Device: Oxymask  Factor(s) Affecting Performance: Impaired endurance, Impaired mental status, Impaired command following            Swallowing Trials:  Swallowing Trials  Ice: Impaired  Thin Liquid (TN0): Impaired      Comments: Pt required encouragement for PO intake. He opened mouth for presentation of PO with single ice chip. Prolonged oral holding and no swallow initiation requiring removal from mouth from [place]. Attempted to feed pt teaspoon of thin liquids, however, pt unable to remove bolus from spoon. Given poor participation, PO trials were discontinued.       Clinical Impressions  Pt is currently at high risk for aspiration given AMS. Recommend continuation of NPO with NG. SLP to follow for dysphagia therapy as mentation improves.       Recommendations  Diet Consistency: NPO with NG  Instrumentation: Instrumental swallow study pending clinical progress  Medication: Non Oral  Oral Care: Q2h         SLP Treatment Plan  Treatment  Plan: Dysphagia Treatment  SLP Frequency: 3x Per Week  Estimated Duration: Until Therapy Goals Met      Anticipated Discharge Needs  Discharge Recommendations: Recommend post-acute placement for additional speech therapy services prior to discharge home   Therapy Recommendations Upon DC: Dysphagia Training, Community Re-Integration, Patient / Family / Caregiver Education        Patient / Family Goals  Patient / Family Goal #1: did not state  Short Term Goals  Short Term Goal # 1: Pt will consume prefeeding trials with no overt s/sx of aspiration      Pati Greer, SLP

## 2024-04-02 NOTE — PROGRESS NOTES
Hospital Medicine Daily Progress Note    Date of Service  4/2/2024    Chief Complaint  Andrews Sanchez is a 63 y.o. male admitted 3/24/2024 after he found down    Hospital Course    63 y.o. male with past medical history of hypertension hypercholesterolemia who presented 3/24/2024 after he found down for an unknown amount of 9 and he was covered in feces.  Patient found to have significant elevated blood pressure and as per the chart review the blood pressure was 226/143 on upon arrival to [facility].  He was diagnosed with hypertensive crisis, hypoxic respiratory failure, acute encephalopathy and acute kidney injury.  On imaging studies patient found to have 1.2 mm left frontal hyperdensity.  He underwent MRI brain that showed diffuse confluent FLAIR.  He was placed on Precedex gtt. for ongoing agitation and also he was placed on nicardipine infusion for uncontrolled hypertension.     On March 26, 2024 intensivist Dr. Carrera requested to transfer care to hospitalist service.    Interval Problem Update    03/27/24    I evaluated and examined him at the bedside.  Blood pressure still running on the higher side.  I added hydralazine.  Due to patient renal functions I did not ACE or ARB's.  He also found to have mild bradycardia and I did not add beta-blocker.  He still requiring Cardizem gtt.  I am continuing and titrating as per target blood pressure of 120-150.  He is still requiring Precedex gtt. for ongoing agitation.  I am continuing and titrating as per RASS of -1 to +1.  I requested consultation from cardiology for PAULINE as recommended by neurology to evaluate for infective endocarditis.  I discussed plan of care with neurologist Dr. Zapata.  I attempted to call patient's daughter but it was the wrong number.  I ordered CTA head and neck as recommended by neurology.  I evaluated multiple times throughout the day.  Plan is to get feeding tube for nutrition as well as for medication.    03/28/24    I have  noted and examined him at the bedside.  He remains critically ill and not following commands but  He underwent feeding tube placement.  I reviewed CT head and neck that did not show any acute normalities per  I called patient's daughter Cheyenne and I discussed plan of care with the and answered all her questions.  He is still requiring significant amount of nicardipine gtt. I am continuing and titrating nicardipine gtt. as per target systolic blood pressure of 120 to 150 mmHg.  I am also continuing titrating Precedex gtt. as per RASS of -1 to +1.  I personally discussed plan of care with neurologist Dr. Zapata.    03/29/24    I evaluated and examined him at the bedside.  He remains confused.  He attempted to pull his feeding tube.  He is in 2 point soft restraints.  Now blood pressure has significantly improved.  He is still on Precedex.  I am continuing and titrating Precedex as per RASS of -1 to +1.  He is still on a Cardizem gtt. plan is to wean him off from nicardipine and try to manage blood pressure with oral antihypertensive as now we have feeding tube.  Current sodium level is 147.  I started him on free water flushes from feeding tube.  I ordered repeat BMP for this afternoon.  I increase the dose of hydralazine.    03/30/24    I evaluated and examined him at the bedside.  He is still remaining confused and not following commands.  Blood pressure still running on the higher side now he was placed on diltiazem gtt. this morning I discontinued diltiazem due to low ejection fraction and I placed him on Cleviprex  Now he is off from Precedex gtt.  Cardiology has been following him.  Later today he was agitated and looks like labored breathing after ABG that did not show any acute normalities.  I also ordered 1 dose of IV morphine due to concern for pain    03/31/24    I evaluated and examined him at the bedside.  He remains off-and-on agitated.  I evaluated him multiple times throughout the day.  I  ordered IV Versed and that helped and his blood pressure.  I also scheduled him for pain medication and he does look like he is in pain.  Overall poor prognosis as he is not responding to the treatment and he remains significantly confused due to his significant brain injury and low EF.  Blood pressure has been fluctuating.  Current white blood cell count is 12.0, hemoglobin of 12.4 and platelet count of 235.  Sodium has been increasing current sodium level is 152, renal function slightly improved current BUN is 34 and creatinine of 1.52.  I increased free water flushes and plan is to recheck BMP later today.  I added amlodipine 5 mg today for better blood pressure control.  Patient is on several blood pressure medications and blood pressure still running on the higher side.    04/02/24    I evaluated and examined him at the bedside.  He remains in distress.  Sodium level has been trending up.  I started him on D5W.  Blood pressure now slightly improved on multiple blood pressure medications   I am continuing his scheduled pain medications.  I called patient's daughter Cheyenne and updated her regarding Mr. Sanchez current medical condition and plan of care.  I discussed with him about advance care planning and CODE STATUS.  I explained that the difference between full code, DNR and comfort care measures.  She expressed that she will discuss it with patient's brother as well who lives in [place].  Now plan is to transfer him out from [facility].  4/2: Patient seen and examined resting in bed, confused,ws on tube but was held cause he was vomiting.  His HTN not well controlled so increased his Amlodipine   His prognosis is poor so palliative has been consulted and will discus with family   Appreciate rec.     I have discussed this patient's plan of care and discharge plan at IDT rounds today with Case Management, Nursing, Nursing leadership, and other members of the IDT team.    Consultants/Specialty  critical care    Code  Status  DNAR/DNI    Disposition  The patient is not medically cleared for discharge to home or a post-acute facility.      I have placed the appropriate orders for post-discharge needs.    Review of Systems  Review of Systems   Unable to perform ROS: Mental acuity        Physical Exam  Temp:  [36.3 °C (97.3 °F)-37.2 °C (99 °F)] 36.7 °C (98.1 °F)  Pulse:  [53-72] 67  Resp:  [9-20] 18  BP: (127-182)/(59-97) 171/91  SpO2:  [86 %-99 %] 97 %    Physical Exam  Vitals reviewed.   Constitutional:       General: He is in acute distress.      Appearance: He is ill-appearing.      Comments: Soft restraints   HENT:      Head: Normocephalic and atraumatic. No contusion.      Right Ear: External ear normal.      Left Ear: External ear normal.      Nose: Nose normal.      Comments: Feeding tube     Mouth/Throat:      Pharynx: No oropharyngeal exudate.   Eyes:      General:         Right eye: No discharge.         Left eye: No discharge.      Pupils: Pupils are equal, round, and reactive to light.   Cardiovascular:      Rate and Rhythm: Normal rate and regular rhythm.      Heart sounds: No murmur heard.     No friction rub. No gallop.   Pulmonary:      Effort: Pulmonary effort is normal.      Breath sounds: No wheezing or rhonchi.   Abdominal:      General: Bowel sounds are normal. There is no distension.      Palpations: Abdomen is soft.      Tenderness: There is no abdominal tenderness. There is no rebound.   Musculoskeletal:         General: No swelling or tenderness. Normal range of motion.      Cervical back: No rigidity. No muscular tenderness.   Skin:     General: Skin is warm and dry.      Coloration: Skin is not jaundiced.   Neurological:      General: No focal deficit present.      Mental Status: He is alert. He is disoriented.      Cranial Nerves: No cranial nerve deficit.      Sensory: No sensory deficit.      Comments: Not following commands       I performed physical exam on April 1, 2024 it remains similar without  any acute changes  Fluids    Intake/Output Summary (Last 24 hours) at 4/2/2024 1435  Last data filed at 4/2/2024 0748  Gross per 24 hour   Intake 1157.92 ml   Output 1150 ml   Net 7.92 ml       Laboratory  Recent Labs     03/31/24  0520 04/01/24  0440 04/02/24  0142   WBC 12.0* 10.6 10.4   RBC 4.16* 4.35* 4.55*   HEMOGLOBIN 12.4* 13.1* 13.5*   HEMATOCRIT 39.3* 41.1* 42.7   MCV 94.5 94.5 93.8   MCH 29.8 30.1 29.7   MCHC 31.6* 31.9* 31.6*   RDW 58.0* 57.7* 57.3*   PLATELETCT 235 244 250   MPV 10.8 10.9 11.6     Recent Labs     04/01/24  0440 04/01/24  1247 04/02/24  0142   SODIUM 152* 152* 152*   POTASSIUM 4.2 4.2 4.2   CHLORIDE 119* 119* 117*   CO2 23 25 23   GLUCOSE 102* 125* 93   BUN 35* 35* 35*   CREATININE 1.32 1.21 1.25   CALCIUM 8.6 8.6 9.0                       Imaging  US-RENAL ARTERY DUPLEX COMP   Final Result      [id number] VIEW   Final Result      No evidence of bowel obstruction or constipation.      DX-ABDOMEN FOR TUBE PLACEMENT   Final Result         Gastric drainage tube with tip projecting over the expected area of the first portion duodenum.      DX-ABDOMEN FOR TUBE PLACEMENT   Final Result      The esophagogastric tube placed in the interval is coiled in the back of the patient's throat.      IDr. Thanh, discussed the results of this examination directly by phone with Karen FRANKLIN on 3/29/2024 at 1706 hours.      DX-ABDOMEN FOR TUBE PLACEMENT   Final Result      The esophagogastric tube is coiled back upon itself in the distal third of the esophagus. Repositioning is recommended.      DX-ABDOMEN FOR TUBE PLACEMENT   Final Result      1. The nasogastric tube is coiled back upon itself in the distal third of the esophagus; repositioning is recommended.   2. Dr. Thanh DOS SANTOS, discussed the results of this examination directly by phone with RIGOBERTO Martinez on 3/29/2024 at 1549 hours.      DX-ABDOMEN FOR TUBE PLACEMENT   Final Result         1.  Air-filled distended loops of bowel are  seen with reactive mucosal pattern in the upper abdomen, appearance suggests ileus or enteritis. Recommend radiographic followup to resolution to exclude progression to obstruction.   2.  Dobbhoff tube tip overlying the expected location of the pylorus or first duodenal segment.   3.  Cardiomegaly      CT-CTA NECK WITH & W/O-POST PROCESSING   Final Result      1.  No acute neck arterial abnormality detected. No significant arterial stenosis.      CT-CTA HEAD WITH & W/O-POST PROCESS   Final Result      1.  No acute arterial abnormality is detected.   2.  Severe white matter, brainstem and cerebellar hypoattenuation, indeterminate.   3.  2 mm density left frontal lobe suspicious for a small microhemorrhage.   4.  13 x 8 mm indeterminate focus of hypoattenuation in the right cerebellum.      MR-BRAIN-W/O   Final Result      1.  Diffuse confluent FLAIR hyperintense signal abnormality throughout the supratentorial white matter including posterior limbs internal capsules, subinsular white matter, as well as thalamic gray matter. Findings would be atypical and extreme for    chronic microvascular ischemic change. Toxic or metabolic leukoencephalopathy or consequence of other diffuse white matter insult including sequela of narcotic or other substance abuse might be considered. The extremely diffuse involvement would also be    very atypical for posterior reversible encephalopathy syndrome (PRES).   2.  There is also diffuse FLAIR signal involving the brainstem, middle cerebellar peduncles, and cerebellar white matter, presumably the same etiology as the supratentorial process.   3.  Innumerable supratentorial and posterior fossa foci of microhemorrhage compatible with the given history of hypertension. One of these foci probably corresponds to the punctate focus of acute hemorrhagic density in the left frontal deep white matter    seen on the CT scan. This focus shows no significant surrounding vasogenic edema or mass  effect.   4.  11 mm ovoid focus of FLAIR hyperintensity in the right cerebellar deep white matter likely representing encephalomalacia related to old infarct or other remote insult.   5.  Subcentimeter foci x3 of bright diffusion signal involving the left frontal deep white matter, left parietal deep white matter, and left occipital peritrigonal white matter respectively, consistent with acute lacunar size infarcts.      [id number] VIEW   Final Result      1.  No evidence of bowel obstruction.   2.  Lumbar fusion hardware and metallic clips. No other metallic foreign body seen.   3.  Severe degenerative changes bilateral hip joints with likely AVN of the bilateral femoral heads.      EC-ECHOCARDIOGRAM COMPLETE W/O CONT   Final Result      CT-HEAD W/O   Final Result   Addendum (preliminary) 1 of 1   VOALTE message of critical findings sent to Dr. Huizar at 3/24/2024 11:37    AM. I also received confirmation of receipt of VOALTE message back from    the provider.      Final      1.  2 mm focus of hyperdensity in the left frontal white matter. This could be a small hemorrhage versus other etiology as above.   2.  Advanced confluent nonspecific white matter hypoattenuation which is markedly abnormal. Recommend follow-up with brain MRI study.      Based solely on CT findings, the brain injury guideline category is mBIG 1.      SDH < 4mm   IPH < 4mm   SAH < 3 sulci and < 1mm      The original BIG retrospective analysis found radiographic progression in 0% of BIG 1 patients and 2.6% BIG 2.      Efforts are underway to contact referring physician with results at time of dictation.      DX-CHEST-PORTABLE (1 VIEW)   Final Result         Hazy bibasilar opacities, left more than right, atelectasis or infection.           Assessment/Plan  * Hypertensive crisis- (present on admission)  Assessment & Plan  4/1/2024   History of HTN, not on home meds per chart review  UDS positive for meth, possible etiology  He was started on  Cardizem gtt.  I am continuing Cardizem gtt. as per target systolic blood pressure of less than 150 mmHg.  His EF is low and if his blood pressure improved plan is to discontinue Cardizem gtt. as is not an ideal medication to use in the setting of low EF.  Blood pressure is still not under control.  I discontinued all drip for blood pressure control and I am increasing more oral medication and as needed medication to achieve better blood pressure control.  Now he is on clonidine and I also added amlodipine.  Continue hydralazine 50 mg every 8 hourly.  Now plan is to transfer him out from [facility].    His HTn still not well controlled so will increase amlodipine dose     Advance care planning  Assessment & Plan  On April 1, 2024 I called patient's daughter Cheyenne and updated her regarding Mr. Sanchez current medical condition and plan of care.  I discussed with him about advance care planning and CODE STATUS.  I explained that the difference between full code, DNR and comfort care measures.  She expressed that she will discuss it with patient's brother as well who lives in [place].  Palliative care consult requested and currently is pending.  I discussed plan of care with nursing staff and charge RN.    Time spent 16 minutes    Hypernatremia  Assessment & Plan  Patient found to have hypernatremia.  I increase frequency and amount of free water flushes now I ordered every 4 hourly 200 cc of free water flushes.  Sodium level remains elevated.  I ordered repeat BMP to ensure improvement in his renal functions.  I started him on D5W  Plan is to recheck sodium level.    Ileus (HCC)  Assessment & Plan  X-ray abdomen for tube placement showed air-filled distended loops of bowel are seen with reactive mucosal pattern in the upper abdomen, appearance suggest ileus or enteritis.  X-ray abdomen did not show acute abnormal findings.    Multiple lacunar infarcts (HCC)  Assessment & Plan  4/1/2024   MRI showed acute lacunar size  infarcts.  Patient will need Zio patch at the time of discharge.  Ordered HbA1c and lipid profile.  I started him on aspirin and atorvastatin.  I reviewed CT head and neck that did not show any acute abnormalities.  After discussing it with neurology I started him on DVT prophylaxis with Lovenox.  I ordered non-tPA stroke order set.  He remains agitated.  I scheduled pain medications.  Overall prognosis is guarded as he has significant brain injury along with that he has severely compromised EF.  Now plan is to transfer him out from [facility] to telemetry floor.    Debility  Assessment & Plan  PT/OT consult requested    Bacteremia due to Gram-negative bacteria  Assessment & Plan  1/2 BC + for GNR  Patient was admitted at [place] on 9/2022 and 12/2022 for left lower extremity abscess that grew enterobacter, enterococcus, deteriorates   No overt murmurs and TTE was unrevealing  Patient has hardware in his back but no back pain on physical exam  Unclear source  Culture came back as a contaminant and I discontinued antibiotic to avoid adverse effects.      Prolonged Q-T interval on ECG  Assessment & Plan  4/1/2024   QTc 503  Continue to monitor colitis and replace as needed.  Avoid QT prolonging agents as able  Continue to monitor closely on telemetry.    Abnormal imaging of central nervous system  Assessment & Plan  CT head w/o contrast revealing 2mm focus hyperdensity in left frontal white matter; vascular neurology evaluated patient in ED and think this is an incidental finding and not likely intraparenchymal hemorrhage  MRI completed on 3/25, multiple findings,   Neurology evaluated him and made recommendations.  Continue to monitor closely on telemetry.  Continue to monitor blood pressure.  I reviewed finding of EEG.  Now plan is to transfer him out to telemetry floor.    Hypophosphatemia  Assessment & Plan  4/1/2024   Continue to monitor and replace as needed.    Hypokalemia  Assessment & Plan  04/01/24     Continue  "to monitor and replace as needed.    FRANKLYN (acute kidney injury) ([facility])  Assessment & Plan  4/1/2024   Unclear baseline, possibly 1.0-1.1 however last stable Cr from 2022  UA relatively bland, protein loss noted  Limit nephrotoxins and renally dose as appropriate  Renal function slightly improved today.  I will repeat BMP at 1 PM.  Renal function has been improving.  If it will continue to improve we can potentially start him on ACE inhibitor's.  Continue to monitor closely.    Acute metabolic encephalopathy  Assessment & Plan  Unclear etiology, possibly multifactorial in setting of meth use, Wernicke's (history of alcohol use, unclear if current), PRES, acute hypoxic respiratory failure  CT head revealed 2mm focus hyperdensity (vascular neurology evaluated patient in ED and think finding is incidental rather than intraparenchymal hemorrhage)  I reviewed finding of MRI brain.    I discussed plan of care with neurologist.  Due to MRI finding neurologist recommended Zio patch at the time of discharge.  Started on empiric IV thiamine   Continue to monitor closely.  I reviewed EEG that showed \"Diffuse slowing of the background, suggestive of diffuse and/or multifocal cerebral dysfunction. This finding might be seen in a myriad of encephalopathies and in itself is a nonspecific finding.\"  Ongoing encephalopathy multifactorial due to hyponatremia and significant brain injury on imaging.  He is not following commands and remains in soft restraints.  I called patient's daughter and updated her.    Elevated troponin  Assessment & Plan  Possibly related to underlying demand ischemia with decreased renal clearance   Troponins trended  Continue monitor closely on telemetry      HFrEF (heart failure with reduced ejection fraction) ([facility])  Assessment & Plan  4/1/2024   No known history of heart failure, possibly meth induced  Echo (3/24): Per report, mild concentric LVH, EF estimated to be 25-30%   Likely initiation of GDMT once " "patient  is more stable.  Cardiology recommended recommendations.    Acute hypoxic respiratory failure (HCC)  Assessment & Plan  Unclear if the patient is on oxygen at home, no known history of COPD  Bibasilar opacities L > R, empirically started on Unasyn in the ED but later discontinued as etiology preferred to be pulmonary edema, overall decreasing O2 requirements  Continue monitor closely in IMCU.  Currently is requiring 3 L of oxygen to maintain oxygen saturation.  His oxygen saturation is significantly better is around 98%.  Requested bedside RN to titrate down oxygen as tolerated.  Now plan is to transfer him to telemetry floor    Positive urine drug screen- (present on admission)  Assessment & Plan  UDS positive for amphetamines, this problem dates back many years with prior positive urine drug screens.    Counseling when appropriate.    Essential hypertension- (present on admission)  Assessment & Plan  Blood pressure is poorly controlled please see \"hypertensive crisis\" for details.              VTE prophylaxis: scd     Greater than 51 minutes spent prepping to see patient (e.g. review of tests) obtaining and/or reviewing separately obtained history. Performing a medically appropriate examination and/ evaluation.  Counseling and educating the patient/family/caregiver.  Ordering medications, tests, or procedures.  Referring and communicating with other health care professionals.  Documenting clinical information in EPIC.  Independently interpreting results and communicating results to patient/family/caregiver.  Care coordination.      I have performed a physical exam and reviewed and updated ROS and Plan today (4/2/2024). In review of yesterday's note (4/1/2024), there are no changes except as documented above.        "

## 2024-04-02 NOTE — CARE PLAN
The patient is Watcher - Medium risk of patient condition declining or worsening    Shift Goals  Clinical Goals: GOC discussion with family, comfort  Patient Goals: POLINA  Family Goals: n/a    Progress made toward(s) clinical / shift goals:      Problem: Safety - Medical Restraint  Goal: Remains free of injury from restraints (Restraint for Interference with Medical Device)  Outcome: Progressing     Problem: Skin Integrity  Goal: Skin integrity is maintained or improved  Outcome: Progressing       Patient is not progressing towards the following goals:      Problem: Neuro Status  Goal: Neuro status will remain stable or improve  Outcome: Not Progressing    Patient continues to be AAOx0, and only responds to painful stimuli.

## 2024-04-02 NOTE — PROGRESS NOTES
4 Eyes Skin Assessment Completed by RIGOBERTO dominguez and RIGOBERTO rendon.    Head WDL  Ears Redness sutures right ear  Nose WDL  Mouth WDL  Neck WDL  Breast/Chest WDL  Shoulder Blades WDL  Spine WDL  (R) Arm/Elbow/Hand Redness, Blanching, Bruising, and Edema  (L) Arm/Elbow/Hand Redness, Blanching, Bruising, and Edema  Abdomen WDL  Groin Redness  Scrotum/Coccyx/Buttocks Redness, Blanching, and Non-Blanching sacrum redness and blanching  (R) Leg Redness and Non-Blanching  (L) Leg Redness and Non-Blanching  (R) Heel/Foot/Toe Blanching dti toes  (L) Heel/Foot/Toe Non-Blanching dti toes          Devices In Places ECG, Blood Pressure Cuff, Pulse Ox, and Oxy Mask      Interventions In Place NC Cheek Stickers, Heel Float Boots, Pillows, Q2 Turns, and Low Air Loss Mattress    Possible Skin Injury Yes    Pictures Uploaded Into Epic N/A  Wound Consult Placed Yes  RN Wound Prevention Protocol Ordered Yes

## 2024-04-02 NOTE — CARE PLAN
The patient is Watcher - Medium risk of patient condition declining or worsening    Shift Goals  Clinical Goals: VSS, tube feeding, comfort  Patient Goals: genesis  Family Goals: n/a    Progress made toward(s) clinical / shift goals:    Problem: Safety - Medical Restraint  Goal: Remains free of injury from restraints (Restraint for Interference with Medical Device)  Outcome: Progressing  Goal: Free from restraint(s) (Restraint for Interference with Medical Device)  Outcome: Progressing     Problem: Pain - Standard  Goal: Alleviation of pain or a reduction in pain to the patient’s comfort goal  Outcome: Progressing     Problem: Skin Integrity  Goal: Skin integrity is maintained or improved  Outcome: Progressing     Problem: Fall Risk  Goal: Patient will remain free from falls  Outcome: Progressing       Patient is not progressing towards the following goals:

## 2024-04-02 NOTE — CARE PLAN
The patient is Watcher - Medium risk of patient condition declining or worsening    Shift Goals  Clinical Goals: SBP < 160  Patient Goals: genesis  Family Goals: genesis    Progress made toward(s) clinical / shift goals:  pt bp remains within goal limits      Patient is not progressing towards the following goals:no improvement in neuro status    Problem: Knowledge Deficit - Standard  Goal: Patient and family/care givers will demonstrate understanding of plan of care, disease process/condition, diagnostic tests and medications  Outcome: Progressing     Problem: Safety - Medical Restraint  Goal: Remains free of injury from restraints (Restraint for Interference with Medical Device)  Outcome: Progressing  Goal: Free from restraint(s) (Restraint for Interference with Medical Device)  Outcome: Progressing     Problem: Pain - Standard  Goal: Alleviation of pain or a reduction in pain to the patient’s comfort goal  Outcome: Progressing     Problem: Skin Integrity  Goal: Skin integrity is maintained or improved  Outcome: Progressing

## 2024-04-02 NOTE — THERAPY
Occupational Therapy   Initial Evaluation     Patient Name: Andrews Sanchez  Age:  64 y.o., Sex:  male  Medical Record #: [id number]  Today's Date: 4/2/2024     Precautions  Precautions: (P) Fall Risk, Swallow Precautions, Nasogastric Tube  Comments: (P) B wrist restraints    Assessment  Patient is 64 y.o. male who presents to acute after being found down covered in feces, found to have HTN emergency, hypoxic respiratory failure, acute encephalopathy, and FRANKLYN. Today pt required total A for bed mobility and ADL participation. Pt demo'd impaired balance, functional mobility, activity tolerance, and cognition. Will continue to follow at lower frequency until pt becomes more participatory.    Plan    Occupational Therapy Initial Treatment Plan   Treatment Interventions: (P) Self Care / Activities of Daily Living, Neuro Re-Education / Balance, Therapeutic Exercises, Therapeutic Activity, Adaptive Equipment  Treatment Frequency: (P) 1 Time per Week  Duration: (P) Until Therapy Goals Met    DC Equipment Recommendations: (P) Unable to determine at this time  Discharge Recommendations: (P) Recommend post-acute placement for additional occupational therapy services prior to discharge home          Objective       04/02/24 1331   Charge Group   OT Evaluation OT Evaluation High   Initial Contact Note    Initial Contact Note Order Received and Verified, Occupational Therapy Evaluation in Progress with Full Report to Follow.   Prior Living Situation   Prior Services None   Housing / Facility Motor Home   Lives with - Patient's Self Care Capacity Alone and Able to Care For Self   Comments information pulled from EMR as pt is confused, pt appears to have limited/no social support   Prior Level of ADL Function   Self Feeding Independent   Grooming / Hygiene Independent   Bathing Independent   Dressing Independent   Toileting Independent   Precautions   Precautions Fall Risk;Swallow Precautions;Nasogastric Tube   Comments B wrist  restraints   Vitals   O2 (LPM) 3   O2 Delivery Device Oxymask   Cognition    Cognition / Consciousness X   Speech/ Communication Unable to Communicate   Level of Consciousness Responds to pain   Ability To Follow Commands Unable to Follow 1 Step Commands   Safety Awareness Impaired   New Learning Impaired   Attention Impaired   Sequencing Impaired   Initiation Impaired   Comments pt followed 1, 1 step command, unable to visually track during session, did respond to clap   Active ROM Upper Body   Comments pt spontaneously pulling at lines w/ B UE's unable to formally assess   Balance Assessment   Sitting Balance (Static) Dependent   Sitting Balance (Dynamic) Dependent   Weight Shift Sitting Absent   Comments w/ B UE support   Bed Mobility    Supine to Sit Total Assist   Sit to Supine Total Assist   Scooting Total Assist   Rolling Total Assist to Rt.;Total Assist to Lt.   ADL Assessment   Eating   (NG)   Grooming Total Assist;Seated  (w/ HHA washed face)   Lower Body Dressing Total Assist   Toileting Total Assist   How much help from another person does the patient currently need...   Putting on and taking off regular lower body clothing? 1   Bathing (including washing, rinsing, and drying)? 1   Toileting, which includes using a toilet, bedpan, or urinal? 1   Putting on and taking off regular upper body clothing? 1   Taking care of personal grooming such as brushing teeth? 1   Eating meals? 1   6 Clicks Daily Activity Score 6   Functional Mobility   Sit to Stand Unable to Participate   Bed, Chair, Wheelchair Transfer Unable to Participate   Mobility EOB   Activity Tolerance   Sitting Edge of Bed 5 min   Comments limited by cognition and weakness   Patient / Family Goals   Patient / Family Goal #1 none stated   Short Term Goals   Short Term Goal # 1 Pt will sit EOB w/ mod A in preparation for seated ADLs   Short Term Goal # 2 Pt will demo seated grooming w/ min A   Short Term Goal # 3 Pt will demo ADL txf w/ mod A    Education Group   Role of Occupational Therapist Patient Response Patient;No Learning Evidence;Explanation   Occupational Therapy Initial Treatment Plan    Treatment Interventions Self Care / Activities of Daily Living;Neuro Re-Education / Balance;Therapeutic Exercises;Therapeutic Activity;Adaptive Equipment   Treatment Frequency 1 Time per Week   Duration Until Therapy Goals Met   Problem List   Problem List Decreased Active Daily Living Skills;Decreased Homemaking Skills;Decreased Functional Mobility;Decreased Activity Tolerance;Impaired Postural Control / Balance;Safety Awareness Deficits / Cognition   Anticipated Discharge Equipment and Recommendations   DC Equipment Recommendations Unable to determine at this time   Discharge Recommendations Recommend post-acute placement for additional occupational therapy services prior to discharge home   Interdisciplinary Plan of Care Collaboration   IDT Collaboration with  Nursing;Physical Therapist   Patient Position at End of Therapy In Bed;Bed Alarm On;Wrist Restraints Applied;Tray Table within Reach;Call Light within Reach;Phone within Reach   Collaboration Comments report given   Session Information   Date / Session Number  4/2, 1 (1/1, 4/8)

## 2024-04-02 NOTE — PROGRESS NOTES
Handoff report received from night shift nurse. Pt care assumed. Pt is currently resting in bed. POC discussed with Pt and Pt verbalizes no questions at this time. Pt is AAOx0, on 3L oxy mask, on Tele monitoring, and VSS. Call light and belongings within reach, bed in lowest and locked position, and Pt educated on use of call light.

## 2024-04-02 NOTE — PROGRESS NOTES
Hospital Medicine Daily Progress Note    Date of Service  4/1/2024    Chief Complaint  Andrews Sanchez is a 63 y.o. male admitted 3/24/2024 after he found down    Hospital Course    63 y.o. male with past medical history of hypertension hypercholesterolemia who presented 3/24/2024 after he found down for an unknown amount of 9 and he was covered in feces.  Patient found to have significant elevated blood pressure and as per the chart review the blood pressure was 226/143 on upon arrival to [facility].  He was diagnosed with hypertensive crisis, hypoxic respiratory failure, acute encephalopathy and acute kidney injury.  On imaging studies patient found to have 1.2 mm left frontal hyperdensity.  He underwent MRI brain that showed diffuse confluent FLAIR.  He was placed on Precedex gtt. for ongoing agitation and also he was placed on nicardipine infusion for uncontrolled hypertension.     On March 26, 2024 intensivist Dr. Carrera requested to transfer care to hospitalist service.    Interval Problem Update    03/27/24    I evaluated and examined him at the bedside.  Blood pressure still running on the higher side.  I added hydralazine.  Due to patient renal functions I did not ACE or ARB's.  He also found to have mild bradycardia and I did not add beta-blocker.  He still requiring Cardizem gtt.  I am continuing and titrating as per target blood pressure of 120-150.  He is still requiring Precedex gtt. for ongoing agitation.  I am continuing and titrating as per RASS of -1 to +1.  I requested consultation from cardiology for PAULINE as recommended by neurology to evaluate for infective endocarditis.  I discussed plan of care with neurologist Dr. Zapata.  I attempted to call patient's daughter but it was the wrong number.  I ordered CTA head and neck as recommended by neurology.  I evaluated multiple times throughout the day.  Plan is to get feeding tube for nutrition as well as for medication.    03/28/24    I have  noted and examined him at the bedside.  He remains critically ill and not following commands but  He underwent feeding tube placement.  I reviewed CT head and neck that did not show any acute normalities per  I called patient's daughter Cheyenne and I discussed plan of care with the and answered all her questions.  He is still requiring significant amount of nicardipine gtt. I am continuing and titrating nicardipine gtt. as per target systolic blood pressure of 120 to 150 mmHg.  I am also continuing titrating Precedex gtt. as per RASS of -1 to +1.  I personally discussed plan of care with neurologist Dr. Zapata.    03/29/24    I evaluated and examined him at the bedside.  He remains confused.  He attempted to pull his feeding tube.  He is in 2 point soft restraints.  Now blood pressure has significantly improved.  He is still on Precedex.  I am continuing and titrating Precedex as per RASS of -1 to +1.  He is still on a Cardizem gtt. plan is to wean him off from nicardipine and try to manage blood pressure with oral antihypertensive as now we have feeding tube.  Current sodium level is 147.  I started him on free water flushes from feeding tube.  I ordered repeat BMP for this afternoon.  I increase the dose of hydralazine.    03/30/24    I evaluated and examined him at the bedside.  He is still remaining confused and not following commands.  Blood pressure still running on the higher side now he was placed on diltiazem gtt. this morning I discontinued diltiazem due to low ejection fraction and I placed him on Cleviprex  Now he is off from Precedex gtt.  Cardiology has been following him.  Later today he was agitated and looks like labored breathing after ABG that did not show any acute normalities.  I also ordered 1 dose of IV morphine due to concern for pain    03/31/24    I evaluated and examined him at the bedside.  He remains off-and-on agitated.  I evaluated him multiple times throughout the day.  I  ordered IV Versed and that helped and his blood pressure.  I also scheduled him for pain medication and he does look like he is in pain.  Overall poor prognosis as he is not responding to the treatment and he remains significantly confused due to his significant brain injury and low EF.  Blood pressure has been fluctuating.  Current white blood cell count is 12.0, hemoglobin of 12.4 and platelet count of 235.  Sodium has been increasing current sodium level is 152, renal function slightly improved current BUN is 34 and creatinine of 1.52.  I increased free water flushes and plan is to recheck BMP later today.  I added amlodipine 5 mg today for better blood pressure control.  Patient is on several blood pressure medications and blood pressure still running on the higher side.    04/02/24    I evaluated and examined him at the bedside.  He remains in distress.  Sodium level has been trending up.  I started him on D5W.  Blood pressure now slightly improved on multiple blood pressure medications   I am continuing his scheduled pain medications.  I called patient's daughter Cheyenne and updated her regarding Mr. Sanchez current medical condition and plan of care.  I discussed with him about advance care planning and CODE STATUS.  I explained that the difference between full code, DNR and comfort care measures.  She expressed that she will discuss it with patient's brother as well who lives in [place].  Now plan is to transfer him out from [facility].        I have discussed this patient's plan of care and discharge plan at IDT rounds today with Case Management, Nursing, Nursing leadership, and other members of the IDT team.    Consultants/Specialty  critical care    Code Status  Full Code    Disposition  Medically Cleared  I have placed the appropriate orders for post-discharge needs.    Review of Systems  Review of Systems   Unable to perform ROS: Mental acuity        Physical Exam  Temp:  [36.5 °C (97.7 °F)-37.2 °C (99 °F)]  37.2 °C (99 °F)  Pulse:  [53-71] 58  Resp:  [9-28] 20  BP: (127-156)/(61-76) 154/76  SpO2:  [83 %-99 %] 93 %    Physical Exam  Vitals reviewed.   Constitutional:       General: He is in acute distress.      Appearance: He is ill-appearing.      Comments: Soft restraints   HENT:      Head: Normocephalic and atraumatic. No contusion.      Right Ear: External ear normal.      Left Ear: External ear normal.      Nose: Nose normal.      Comments: Feeding tube     Mouth/Throat:      Pharynx: No oropharyngeal exudate.   Eyes:      General:         Right eye: No discharge.         Left eye: No discharge.      Pupils: Pupils are equal, round, and reactive to light.   Cardiovascular:      Rate and Rhythm: Normal rate and regular rhythm.      Heart sounds: No murmur heard.     No friction rub. No gallop.   Pulmonary:      Effort: Pulmonary effort is normal.      Breath sounds: No wheezing or rhonchi.   Abdominal:      General: Bowel sounds are normal. There is no distension.      Palpations: Abdomen is soft.      Tenderness: There is no abdominal tenderness. There is no rebound.   Musculoskeletal:         General: No swelling or tenderness. Normal range of motion.      Cervical back: No rigidity. No muscular tenderness.   Skin:     General: Skin is warm and dry.      Coloration: Skin is not jaundiced.   Neurological:      General: No focal deficit present.      Mental Status: He is alert. He is disoriented.      Cranial Nerves: No cranial nerve deficit.      Sensory: No sensory deficit.      Comments: Not following commands       I performed physical exam on April 1, 2024 it remains similar without any acute changes  Fluids    Intake/Output Summary (Last 24 hours) at 4/1/2024 2316  Last data filed at 4/1/2024 2109  Gross per 24 hour   Intake 1500 ml   Output 1500 ml   Net 0 ml       Laboratory  Recent Labs     03/30/24  0136 03/31/24  0520 04/01/24  0440   WBC 12.7* 12.0* 10.6   RBC 4.40* 4.16* 4.35*   HEMOGLOBIN 13.1* 12.4*  13.1*   HEMATOCRIT 40.2* 39.3* 41.1*   MCV 91.4 94.5 94.5   MCH 29.8 29.8 30.1   MCHC 32.6 31.6* 31.9*   RDW 54.6* 58.0* 57.7*   PLATELETCT 255 235 244   MPV 10.2 10.8 10.9     Recent Labs     03/31/24  1332 04/01/24  0440 04/01/24  1247   SODIUM 153* 152* 152*   POTASSIUM 4.0 4.2 4.2   CHLORIDE 119* 119* 119*   CO2 23 23 25   GLUCOSE 128* 102* 125*   BUN 31* 35* 35*   CREATININE 1.46* 1.32 1.21   CALCIUM 8.6 8.6 8.6                       Imaging  US-RENAL ARTERY DUPLEX COMP   Final Result      [id number] VIEW   Final Result      No evidence of bowel obstruction or constipation.      DX-ABDOMEN FOR TUBE PLACEMENT   Final Result         Gastric drainage tube with tip projecting over the expected area of the first portion duodenum.      DX-ABDOMEN FOR TUBE PLACEMENT   Final Result      The esophagogastric tube placed in the interval is coiled in the back of the patient's throat.      I, Dr. Thanh Brian, discussed the results of this examination directly by phone with Karen FRANKLIN on 3/29/2024 at 1706 hours.      DX-ABDOMEN FOR TUBE PLACEMENT   Final Result      The esophagogastric tube is coiled back upon itself in the distal third of the esophagus. Repositioning is recommended.      DX-ABDOMEN FOR TUBE PLACEMENT   Final Result      1. The nasogastric tube is coiled back upon itself in the distal third of the esophagus; repositioning is recommended.   2. IDr. Thanh, discussed the results of this examination directly by phone with RIGOBERTO Martinez on 3/29/2024 at 1549 hours.      DX-ABDOMEN FOR TUBE PLACEMENT   Final Result         1.  Air-filled distended loops of bowel are seen with reactive mucosal pattern in the upper abdomen, appearance suggests ileus or enteritis. Recommend radiographic followup to resolution to exclude progression to obstruction.   2.  Dobbhoff tube tip overlying the expected location of the pylorus or first duodenal segment.   3.  Cardiomegaly      CT-CTA NECK WITH & W/O-POST  PROCESSING   Final Result      1.  No acute neck arterial abnormality detected. No significant arterial stenosis.      CT-CTA HEAD WITH & W/O-POST PROCESS   Final Result      1.  No acute arterial abnormality is detected.   2.  Severe white matter, brainstem and cerebellar hypoattenuation, indeterminate.   3.  2 mm density left frontal lobe suspicious for a small microhemorrhage.   4.  13 x 8 mm indeterminate focus of hypoattenuation in the right cerebellum.      MR-BRAIN-W/O   Final Result      1.  Diffuse confluent FLAIR hyperintense signal abnormality throughout the supratentorial white matter including posterior limbs internal capsules, subinsular white matter, as well as thalamic gray matter. Findings would be atypical and extreme for    chronic microvascular ischemic change. Toxic or metabolic leukoencephalopathy or consequence of other diffuse white matter insult including sequela of narcotic or other substance abuse might be considered. The extremely diffuse involvement would also be    very atypical for posterior reversible encephalopathy syndrome (PRES).   2.  There is also diffuse FLAIR signal involving the brainstem, middle cerebellar peduncles, and cerebellar white matter, presumably the same etiology as the supratentorial process.   3.  Innumerable supratentorial and posterior fossa foci of microhemorrhage compatible with the given history of hypertension. One of these foci probably corresponds to the punctate focus of acute hemorrhagic density in the left frontal deep white matter    seen on the CT scan. This focus shows no significant surrounding vasogenic edema or mass effect.   4.  11 mm ovoid focus of FLAIR hyperintensity in the right cerebellar deep white matter likely representing encephalomalacia related to old infarct or other remote insult.   5.  Subcentimeter foci x3 of bright diffusion signal involving the left frontal deep white matter, left parietal deep white matter, and left occipital  peritrigonal white matter respectively, consistent with acute lacunar size infarcts.      [id number] VIEW   Final Result      1.  No evidence of bowel obstruction.   2.  Lumbar fusion hardware and metallic clips. No other metallic foreign body seen.   3.  Severe degenerative changes bilateral hip joints with likely AVN of the bilateral femoral heads.      EC-ECHOCARDIOGRAM COMPLETE W/O CONT   Final Result      CT-HEAD W/O   Final Result   Addendum (preliminary) 1 of 1   VOALTE message of critical findings sent to Dr. Huizar at 3/24/2024 11:37    AM. I also received confirmation of receipt of VOALTE message back from    the provider.      Final      1.  2 mm focus of hyperdensity in the left frontal white matter. This could be a small hemorrhage versus other etiology as above.   2.  Advanced confluent nonspecific white matter hypoattenuation which is markedly abnormal. Recommend follow-up with brain MRI study.      Based solely on CT findings, the brain injury guideline category is mBIG 1.      SDH < 4mm   IPH < 4mm   SAH < 3 sulci and < 1mm      The original BIG retrospective analysis found radiographic progression in 0% of BIG 1 patients and 2.6% BIG 2.      Efforts are underway to contact referring physician with results at time of dictation.      DX-CHEST-PORTABLE (1 VIEW)   Final Result         Hazy bibasilar opacities, left more than right, atelectasis or infection.           Assessment/Plan  * Hypertensive crisis- (present on admission)  Assessment & Plan  4/1/2024   History of HTN, not on home meds per chart review  UDS positive for meth, possible etiology  He was started on Cardizem gtt.  I am continuing Cardizem gtt. as per target systolic blood pressure of less than 150 mmHg.  His EF is low and if his blood pressure improved plan is to discontinue Cardizem gtt. as is not an ideal medication to use in the setting of low EF.  Blood pressure is still not under control.  I discontinued all drip for blood  pressure control and I am increasing more oral medication and as needed medication to achieve better blood pressure control.  Now he is on clonidine and I also added amlodipine.  Continue hydralazine 50 mg every 8 hourly.  Now plan is to transfer him out from [facility].  Blood pressure better controlled with scheduled pain medications.    Advance care planning  Assessment & Plan  On April 1, 2024 I called patient's daughter Cheyenne and updated her regarding Mr. Sanchez current medical condition and plan of care.  I discussed with him about advance care planning and CODE STATUS.  I explained that the difference between full code, DNR and comfort care measures.  She expressed that she will discuss it with patient's brother as well who lives in [place].  Palliative care consult requested and currently is pending.  I discussed plan of care with nursing staff and charge RN.    Time spent 16 minutes    Hypernatremia  Assessment & Plan  Patient found to have hypernatremia.  I increase frequency and amount of free water flushes now I ordered every 4 hourly 200 cc of free water flushes.  Sodium level remains elevated.  I ordered repeat BMP to ensure improvement in his renal functions.  I started him on D5W  Plan is to recheck sodium level.    Ileus (HCC)  Assessment & Plan  X-ray abdomen for tube placement showed air-filled distended loops of bowel are seen with reactive mucosal pattern in the upper abdomen, appearance suggest ileus or enteritis.  X-ray abdomen did not show acute abnormal findings.    Multiple lacunar infarcts (HCC)  Assessment & Plan  4/1/2024   MRI showed acute lacunar size infarcts.  Patient will need Zio patch at the time of discharge.  Ordered HbA1c and lipid profile.  I started him on aspirin and atorvastatin.  I reviewed CT head and neck that did not show any acute abnormalities.  After discussing it with neurology I started him on DVT prophylaxis with Lovenox.  I ordered non-tPA stroke order set.  He  remains agitated.  I scheduled pain medications.  Overall prognosis is guarded as he has significant brain injury along with that he has severely compromised EF.  Now plan is to transfer him out from [facility] to telemetry floor.    Debility  Assessment & Plan  PT/OT consult requested    Bacteremia due to Gram-negative bacteria  Assessment & Plan  1/2 BC + for GNR  Patient was admitted at [facility] on 9/2022 and 12/2022 for left lower extremity abscess that grew enterobacter, enterococcus, deteriorates   No overt murmurs and TTE was unrevealing  Patient has hardware in his back but no back pain on physical exam  Unclear source  Culture came back as a contaminant and I discontinued antibiotic to avoid adverse effects.      Prolonged Q-T interval on ECG  Assessment & Plan  4/1/2024   QTc 503  Continue to monitor colitis and replace as needed.  Avoid QT prolonging agents as able  Continue to monitor closely on telemetry.    Abnormal imaging of central nervous system  Assessment & Plan  CT head w/o contrast revealing 2mm focus hyperdensity in left frontal white matter; vascular neurology evaluated patient in ED and think this is an incidental finding and not likely intraparenchymal hemorrhage  MRI completed on 3/25, multiple findings,   Neurology evaluated him and made recommendations.  Continue to monitor closely on telemetry.  Continue to monitor blood pressure.  I reviewed finding of EEG.  Now plan is to transfer him out to telemetry floor.    Hypophosphatemia  Assessment & Plan  4/1/2024   Continue to monitor and replace as needed.    Hypokalemia  Assessment & Plan  04/01/24     Continue to monitor and replace as needed.    FRANKLYN (acute kidney injury) (HCC)  Assessment & Plan  4/1/2024   Unclear baseline, possibly 1.0-1.1 however last stable Cr from 2022  UA relatively bland, protein loss noted  Limit nephrotoxins and renally dose as appropriate  Renal function slightly improved today.  I will repeat BMP at 1 PM.  Renal  "function has been improving.  If it will continue to improve we can potentially start him on ACE inhibitor's.  Continue to monitor closely.    Acute metabolic encephalopathy  Assessment & Plan  Unclear etiology, possibly multifactorial in setting of meth use, Wernicke's (history of alcohol use, unclear if current), PRES, acute hypoxic respiratory failure  CT head revealed 2mm focus hyperdensity (vascular neurology evaluated patient in ED and think finding is incidental rather than intraparenchymal hemorrhage)  I reviewed finding of MRI brain.    I discussed plan of care with neurologist.  Due to MRI finding neurologist recommended Zio patch at the time of discharge.  Started on empiric IV thiamine   Continue to monitor closely.  I reviewed EEG that showed \"Diffuse slowing of the background, suggestive of diffuse and/or multifocal cerebral dysfunction. This finding might be seen in a myriad of encephalopathies and in itself is a nonspecific finding.\"  Ongoing encephalopathy multifactorial due to hyponatremia and significant brain injury on imaging.  He is not following commands and remains in soft restraints.  I called patient's daughter and updated her.    Elevated troponin  Assessment & Plan  Possibly related to underlying demand ischemia with decreased renal clearance   Troponins trended  Continue monitor closely on telemetry      HFrEF (heart failure with reduced ejection fraction) (HCC)  Assessment & Plan  4/1/2024   No known history of heart failure, possibly meth induced  Echo (3/24): Per report, mild concentric LVH, EF estimated to be 25-30%   Likely initiation of GDMT once patient  is more stable.  Cardiology recommended recommendations.    Acute hypoxic respiratory failure (HCC)  Assessment & Plan  Unclear if the patient is on oxygen at home, no known history of COPD  Bibasilar opacities L > R, empirically started on Unasyn in the ED but later discontinued as etiology preferred to be pulmonary edema, " "overall decreasing O2 requirements  Continue monitor closely in IMCU.  Currently is requiring 3 L of oxygen to maintain oxygen saturation.  His oxygen saturation is significantly better is around 98%.  Requested bedside RN to titrate down oxygen as tolerated.  Now plan is to transfer him to telemetry floor    Positive urine drug screen- (present on admission)  Assessment & Plan  UDS positive for amphetamines, this problem dates back many years with prior positive urine drug screens.    Counseling when appropriate.    Essential hypertension- (present on admission)  Assessment & Plan  Blood pressure is poorly controlled please see \"hypertensive crisis\" for details.      I discussed plan of care during multidisciplinary rounds regarding patient's current medical condition and plan of care.        VTE prophylaxis:    enoxaparin ppx      I have performed a physical exam and reviewed and updated ROS and Plan today (4/1/2024). In review of yesterday's note (3/31/2024), there are no changes except as documented above.        "

## 2024-04-02 NOTE — CONSULTS
SUMMARY discussed patient's status with daughterCheyenne who agrees to transition patient's CODE STATUS to DNR/DNI.  PC to continue to follow.      MRN: [id number]  Date of palliative consult: 3/30/2024  Reason for consult: ACP/GOC  Referring provider: Elizabeth  Location of consult: Tele 714-02  Additional consulting services: Cardiology, hospital medicine, medicine and rehab, neurology    HPI:   Andrews Sanchez is a 64 y.o. male with past medical history of hypertension and previous admissions for hypertensive crisis and wound care who presented on 3/24/2024 send EMS transport brought in from home with a head injury and unknown downtime (patient was found down on the ground by a neighbor), and hypertension (BPA prior to arrival 260/150 given 5 mg of metoprolol IV by EMS per ER physician orders).  Patient was also noted to be covered in his feces, malodorous, disheveled, with bilateral scleral injection.  Chest x-ray revealed concern for infection versus atelectasis patient was treated empirically with Unasyn and azithromycin.  CT head notable for 2 mm focus hyperdensity, vascular neurology consulted in emergency department and believe this is an incidental finding and not an intraparenchymal hemorrhage.    Patient  increasingly became agitated with hypertensive crisis, hypoxic respiratory failure, and acute encephalopathy as well as acute kidney injury.  He was admitted to care unit and placed on a Precedex drip for ongoing agitation as well as nicardipine infusion for uncontrolled hypertension.    [place] [facility] [place]1 was transferred to telemetry.    Significant/pertinent past medical history: Upper with a 3.2 total pack years, denies alcohol or drug use cell disorder. Hyperlipidemia, hypertension.    Pain History: Unable to assess  Onset:   Location:   Duration:   Characteristics:   Aggravating factors:   Alleviating factors:   Radiation:   Treatments:    Severity:     Additional symptoms: Unable to assess dyspnea, nausea, vomiting, constipation, fatigue, sleep, mood, appetite    Interval History: Remains intermittently agitated has IV Versed as needed.  Overall poor prognosis with significant confusion.  LVEF per most recent echocardiogram on 3/24/2024 indicates severe reduced left systolic function at 25 to 30%.    Medication Allergy/Sensitivities:  Allergies   Allergen Reactions    Bee Venom Swelling       ROS:    Review of Systems   Unable to perform ROS: Mental status change       PE:   Recent vital signs  BMI: Body mass index is 32.83 kg/m².    Temp (24hrs), Av.8 °C (98.2 °F), Min:36.3 °C (97.3 °F), Max:37.2 °C (99 °F)  Temperature: 36.7 °C (98.1 °F), Monitored Temp: 36.8 °C (98.2 °F)  Pulse  Av.7  Min: 37  Max: 96   Blood Pressure : (!) 147/87       Physical Exam  Vitals and nursing note reviewed.   Constitutional:       General: He is not in acute distress.     Appearance: He is obese. He is ill-appearing.   Skin:     General: Skin is cool and dry.      Capillary Refill: Capillary refill takes 2 to 3 seconds.      Coloration: Skin is pale.   Neurological:      Mental Status: He is disoriented and confused.   Psychiatric:         Mood and Affect: Mood is anxious.         Speech: He is noncommunicative.         Behavior: Behavior is uncooperative, agitated and aggressive.         Cognition and Memory: Cognition is impaired. Memory is impaired.         Judgment: Judgment is impulsive and inappropriate.       Recent Labs     24  0440 24  1247 24  0142   SODIUM 152* 152* 152*   POTASSIUM 4.2 4.2 4.2   CHLORIDE 119* 119* 117*   CO2 23 25 23   GLUCOSE 102* 125* 93   BUN 35* 35* 35*   CREATININE 1.32 1.21 1.25   CALCIUM 8.6 8.6 9.0     Recent Labs     24  0520 24  0440 24  0142   WBC 12.0* 10.6 10.4   RBC 4.16* 4.35* 4.55*   HEMOGLOBIN 12.4* 13.1* 13.5*   HEMATOCRIT 39.3* 41.1* 42.7   MCV 94.5 94.5 93.8   MCH 29.8 30.1  29.7   [facility] 31.6* 31.9* 31.6*   RDW 58.0* 57.7* 57.3*   PLATELETCT 235 244 250   MPV 10.8 10.9 11.6       ASSESSMENT/PLAN WITH SHARED DECISION MAKING:   Review  Pertinent imaging reviewed.    PHYSICAL ASPECTS OF CARE  Palliative Performance Scale: 30% currently, unknown previous PPS    # Hypertensive crisis  # Hypernatremia  # Ileus  # Multiple lacunar infarcts  # Functional debility  # Bacteremia  #Acute kidney injury  #Acute metabolic encephalopathy  #HFrEF  #Acute hypoxic respiratory failure  #Positive urine drug screen-methamphetamines  #Essential hypertension  #Delirium/frailty/geriatric syndrome    SOCIAL ASPECTS OF CARE  All information obtained from daughter Cheyenne.  Patient resides alone and was found down covered in his own feces for unknown number of days.  Daughter states that he is a former  and may be had a [employer] business.  He was born in [place] and grew up in [place].  He had 2 daughters 1 of whom is passed away.  Cheyenne states that she has been estranged from her dad given his behaviors.  She states up until years ago his life was fairly normal and then he had a back surgery.  She states after that time he had difficulties with pain and often times overmedicated or self medicated.  Patient also has a brother who lives in [place] whose name is Ruchi Sanchez at [phone].  Unknown level of ADL dependence prior to hospital stay.    SPIRITUAL ASPECTS OF CARE   Daughter states that patient is a [employer] and believes firmly in Jerrell.    GOALS OF CARE/SERIOUS ILLNESS CONVERSATION  Patient seen at bedside with nursing staff.  He is agitated, does not follow any commands, does not make eye contact, is noncommunicative other than groaning with any movement.  Assisted with repositioning patient.  He pushes against staff when turning onto his right side.  He seems to be weaker on his right side.    Telephone call placed to Cheyenne Cain, patient's daughter.  Background information  obtained from Cheyenne.  Cheyenne relays story her uncle told her regarding recent visit with patient prior to hospital stay.  She reports that patient told his brother Buddy that he was tired of this and ready to meet Jerrell.  Explored understanding of patient's current condition to which she has good understanding.  Updated her on current status of tube feeding and post possibility patient would need permanent tube.  Discussed and introduced philosophy of hospice and the patient would qualify for hospice given strokes and current level of debility.  As such, discussed CODE STATUS.  Given current state, functional status, altered mentation, recommend patient be DNR/DNI.  Cheyenne agrees with this and agrees to transition patient to DNR/DNI.  Cheyenne provides provider with uncle's name and phone number.  Additionally, she gives permission for provider to contact on-call to discuss patient's condition.  Cheyenne understands that it will be up to her moving forward to make decisions regarding her dad's care as she is legally recognized surrogate decision maker.    Code Status: DNR/DNI    ACP Documents: Will need POLST prior to discharge    18 minutes spent discussing advance care planning, this time excludes any other billed services.    I spent a total of 48 minutes reviewing medical records, direct face-to-face time with the patient and/or family, documentation and coordination of care. This is separate from the time spent on advance care planning, which is documented above.    Lindsay Au, MSN, APRN, ACNPC-AG.  Palliative Care Nurse Practitioner  [phone]

## 2024-04-03 LAB
ANION GAP SERPL CALC-SCNC: 10 MMOL/L (ref 7–16)
BUN SERPL-MCNC: 25 MG/DL (ref 8–22)
CALCIUM SERPL-MCNC: 8.6 MG/DL (ref 8.5–10.5)
CHLORIDE SERPL-SCNC: 111 MMOL/L (ref 96–112)
CO2 SERPL-SCNC: 24 MMOL/L (ref 20–33)
CREAT SERPL-MCNC: 1.13 MG/DL (ref 0.5–1.4)
ERYTHROCYTE [DISTWIDTH] IN BLOOD BY AUTOMATED COUNT: 52.9 FL (ref 35.9–50)
GFR SERPLBLD CREATININE-BSD FMLA CKD-EPI: 72 ML/MIN/1.73 M 2
GLUCOSE SERPL-MCNC: 104 MG/DL (ref 65–99)
HCT VFR BLD AUTO: 37.5 % (ref 42–52)
HGB BLD-MCNC: 12.3 G/DL (ref 14–18)
MCH RBC QN AUTO: 30.1 PG (ref 27–33)
MCHC RBC AUTO-ENTMCNC: 32.8 G/DL (ref 32.3–36.5)
MCV RBC AUTO: 91.9 FL (ref 81.4–97.8)
METANEPHS SERPL-SCNC: 0.33 NMOL/L (ref 0–0.49)
NORMETANEPHRINE SERPL-SCNC: 1.03 NMOL/L (ref 0–0.89)
PLATELET # BLD AUTO: 231 K/UL (ref 164–446)
PMV BLD AUTO: 11.3 FL (ref 9–12.9)
POTASSIUM SERPL-SCNC: 3.9 MMOL/L (ref 3.6–5.5)
RBC # BLD AUTO: 4.08 M/UL (ref 4.7–6.1)
SODIUM SERPL-SCNC: 145 MMOL/L (ref 135–145)
WBC # BLD AUTO: 9.1 K/UL (ref 4.8–10.8)

## 2024-04-03 PROCEDURE — 36415 COLL VENOUS BLD VENIPUNCTURE: CPT

## 2024-04-03 PROCEDURE — 700105 HCHG RX REV CODE 258: Performed by: INTERNAL MEDICINE

## 2024-04-03 PROCEDURE — 700102 HCHG RX REV CODE 250 W/ 637 OVERRIDE(OP): Performed by: INTERNAL MEDICINE

## 2024-04-03 PROCEDURE — 770001 HCHG ROOM/CARE - MED/SURG/GYN PRIV*

## 2024-04-03 PROCEDURE — A9270 NON-COVERED ITEM OR SERVICE: HCPCS | Performed by: STUDENT IN AN ORGANIZED HEALTH CARE EDUCATION/TRAINING PROGRAM

## 2024-04-03 PROCEDURE — A9270 NON-COVERED ITEM OR SERVICE: HCPCS | Performed by: INTERNAL MEDICINE

## 2024-04-03 PROCEDURE — 85027 COMPLETE CBC AUTOMATED: CPT

## 2024-04-03 PROCEDURE — 80048 BASIC METABOLIC PNL TOTAL CA: CPT

## 2024-04-03 PROCEDURE — 700111 HCHG RX REV CODE 636 W/ 250 OVERRIDE (IP): Mod: JZ,JG | Performed by: INTERNAL MEDICINE

## 2024-04-03 PROCEDURE — 99233 SBSQ HOSP IP/OBS HIGH 50: CPT | Performed by: INTERNAL MEDICINE

## 2024-04-03 PROCEDURE — 700102 HCHG RX REV CODE 250 W/ 637 OVERRIDE(OP): Performed by: STUDENT IN AN ORGANIZED HEALTH CARE EDUCATION/TRAINING PROGRAM

## 2024-04-03 PROCEDURE — 700111 HCHG RX REV CODE 636 W/ 250 OVERRIDE (IP): Performed by: INTERNAL MEDICINE

## 2024-04-03 PROCEDURE — 99232 SBSQ HOSP IP/OBS MODERATE 35: CPT | Performed by: NURSE PRACTITIONER

## 2024-04-03 RX ORDER — LORAZEPAM 2 MG/ML
1 CONCENTRATE ORAL
Status: DISCONTINUED | OUTPATIENT
Start: 2024-04-03 | End: 2024-04-05 | Stop reason: HOSPADM

## 2024-04-03 RX ORDER — GLYCOPYRROLATE 1 MG/1
1 TABLET ORAL 3 TIMES DAILY PRN
Status: DISCONTINUED | OUTPATIENT
Start: 2024-04-03 | End: 2024-04-05 | Stop reason: HOSPADM

## 2024-04-03 RX ORDER — ATROPINE SULFATE 10 MG/ML
2 SOLUTION/ DROPS OPHTHALMIC EVERY 4 HOURS PRN
Status: DISCONTINUED | OUTPATIENT
Start: 2024-04-03 | End: 2024-04-05 | Stop reason: HOSPADM

## 2024-04-03 RX ORDER — LORAZEPAM 2 MG/ML
1 INJECTION INTRAMUSCULAR
Status: DISCONTINUED | OUTPATIENT
Start: 2024-04-03 | End: 2024-04-05 | Stop reason: HOSPADM

## 2024-04-03 RX ORDER — GLYCOPYRROLATE 0.2 MG/ML
0.2 INJECTION INTRAMUSCULAR; INTRAVENOUS 3 TIMES DAILY PRN
Status: DISCONTINUED | OUTPATIENT
Start: 2024-04-03 | End: 2024-04-05 | Stop reason: HOSPADM

## 2024-04-03 RX ORDER — DOCUSATE SODIUM 100 MG/1
100 CAPSULE, LIQUID FILLED ORAL EVERY 12 HOURS
Status: DISCONTINUED | OUTPATIENT
Start: 2024-04-03 | End: 2024-04-05 | Stop reason: HOSPADM

## 2024-04-03 RX ADMIN — DEXTROSE MONOHYDRATE: 50 INJECTION, SOLUTION INTRAVENOUS at 01:16

## 2024-04-03 RX ADMIN — AMLODIPINE BESYLATE 10 MG: 10 TABLET ORAL at 06:00

## 2024-04-03 RX ADMIN — MINERAL OIL, AND WHITE PETROLATUM 1 APPLICATION: 425; 573 OINTMENT OPHTHALMIC at 14:16

## 2024-04-03 RX ADMIN — LORAZEPAM 1 MG: 2 INJECTION, SOLUTION INTRAMUSCULAR; INTRAVENOUS at 16:58

## 2024-04-03 RX ADMIN — OXYCODONE 5 MG: 5 TABLET ORAL at 05:59

## 2024-04-03 RX ADMIN — MORPHINE SULFATE 10 MG: 10 INJECTION, SOLUTION INTRAMUSCULAR; INTRAVENOUS at 16:58

## 2024-04-03 RX ADMIN — NYSTATIN: 100000 POWDER TOPICAL at 06:00

## 2024-04-03 RX ADMIN — ISOSORBIDE DINITRATE 30 MG: 30 TABLET ORAL at 05:59

## 2024-04-03 RX ADMIN — HYDRALAZINE HYDROCHLORIDE 50 MG: 50 TABLET ORAL at 05:59

## 2024-04-03 RX ADMIN — HYDRALAZINE HYDROCHLORIDE 20 MG: 20 INJECTION, SOLUTION INTRAMUSCULAR; INTRAVENOUS at 01:38

## 2024-04-03 RX ADMIN — ASPIRIN 81 MG 81 MG: 81 TABLET ORAL at 05:59

## 2024-04-03 RX ADMIN — MORPHINE SULFATE 10 MG: 10 INJECTION, SOLUTION INTRAMUSCULAR; INTRAVENOUS at 11:08

## 2024-04-03 RX ADMIN — LORAZEPAM 1 MG: 2 INJECTION, SOLUTION INTRAMUSCULAR; INTRAVENOUS at 11:08

## 2024-04-03 RX ADMIN — CARVEDILOL 6.25 MG: 6.25 TABLET, FILM COATED ORAL at 08:21

## 2024-04-03 RX ADMIN — CLONIDINE HYDROCHLORIDE 0.2 MG: 0.1 TABLET ORAL at 05:59

## 2024-04-03 ASSESSMENT — FIBROSIS 4 INDEX: FIB4 SCORE: 1.64

## 2024-04-03 NOTE — CARE PLAN
The patient is Unstable - High likelihood or risk of patient condition declining or worsening   Weaned off 5L of oxygen to room air, comfort care    Shift Goals  Clinical Goals: comfort & monitor vital signs  Patient Goals: POLINA  Family Goals: n/a    Progress made toward(s) clinical / shift goals:    Problem: Knowledge Deficit - Comfort Care  Goal: Patient and family/care givers will demonstrate understanding of dying process and grieving  Outcome: Progressing     Problem: Psychosocial - Comfort Care  Goal: Privacy will be maintained for patient and family  Outcome: Progressing     Problem: Respiratory - Comfort Care  Goal: Patient's respiratory function will be supported  Outcome: Progressing  Patient's oxygenation remains stable on room air after weaning off 5L       Patient is not progressing towards the following goals:

## 2024-04-03 NOTE — DISCHARGE PLANNING
SW received a voalte message from palliative consultant regarding pt's DCP. Per message, pt's daughter would like to move forward with comfort care with hospice. Hospice referral has been placed. Status changed to comfort care.

## 2024-04-03 NOTE — PROGRESS NOTES
SUMMARY:     MRN: [id number]  Date of palliative consult: 3/30/2024  Reason for consult: ACP/GOC  Referring provider: Elizabeth  Location of consult: Tele 530-18  Additional consulting services: Cardiology, hospital medicine, medicine and rehab, neurology    HPI:   Andrews Sanchez is a 64 y.o. male with past medical history of hypertension and previous admissions for hypertensive crisis and wound care who presented on 3/24/2024 send EMS transport brought in from home with a head injury and unknown downtime (patient was found down on the ground by a neighbor), and hypertension (BPA prior to arrival 260/150 given 5 mg of metoprolol IV by EMS per ER physician orders).  Patient was also noted to be covered in his feces, malodorous, disheveled, with bilateral scleral injection.  Chest x-ray revealed concern for infection versus atelectasis patient was treated empirically with Unasyn and azithromycin.  CT head notable for 2 mm focus hyperdensity, vascular neurology consulted in emergency department and believe this is an incidental finding and not an intraparenchymal hemorrhage.    Patient  increasingly became agitated with hypertensive crisis, hypoxic respiratory failure, and acute encephalopathy as well as acute kidney injury.  He was admitted to care unit and placed on a Precedex drip for ongoing agitation as well as nicardipine infusion for uncontrolled hypertension.    Patient was subsequently transferred out of ICU on 3/27/2024 to [facility] [place] and on 4/1 was transferred to telemetry.    Significant/pertinent past medical history: Upper with a 3.2 total pack years, denies alcohol or drug use cell disorder. Hyperlipidemia, hypertension.    Pain History: Unable to assess  Onset:   Location:   Duration:   Characteristics:   Aggravating factors:   Alleviating factors:   Radiation:   Treatments:   Severity:     Additional symptoms: Unable to assess dyspnea, nausea, vomiting, constipation, fatigue, sleep, mood, appetite    Interval  History: Remains intermittently agitated has IV Versed as needed.  Overall poor prognosis with significant confusion.  LVEF per most recent echocardiogram on 3/24/2024 indicates severe reduced left systolic function at 25 to 30%.    4/3: continues to be agitated, groaning and grimacing with stimulation. On 3l oxymask.     Medication Allergy/Sensitivities:  Allergies   Allergen Reactions    Bee Venom Swelling       ROS:    Review of Systems   Unable to perform ROS: Mental status change       PE:   Recent vital signs  BMI: Body mass index is 32.42 kg/m².    Temp (24hrs), Av.7 °C (98 °F), Min:36.3 °C (97.3 °F), Max:37 °C (98.6 °F)  Temperature: 36.8 °C (98.2 °F)  Pulse  Av.5  Min: 37  Max: 96   Blood Pressure : (!) 171/91       Physical Exam  Vitals and nursing note reviewed.   Constitutional:       General: He is not in acute distress.     Appearance: He is obese. He is ill-appearing.   Skin:     General: Skin is cool and dry.      Capillary Refill: Capillary refill takes 2 to 3 seconds.      Coloration: Skin is pale.   Neurological:      Mental Status: He is disoriented and confused.   Psychiatric:         Mood and Affect: Mood is anxious.         Speech: He is noncommunicative.         Behavior: Behavior is uncooperative, agitated and aggressive.         Cognition and Memory: Cognition is impaired. Memory is impaired.         Judgment: Judgment is impulsive and inappropriate.       Recent Labs     24  1247 24  0142 24  0146   SODIUM 152* 152* 145   POTASSIUM 4.2 4.2 3.9   CHLORIDE 119* 117* 111   CO2    GLUCOSE 125* 93 104*   BUN 35* 35* 25*   CREATININE 1.21 1.25 1.13   CALCIUM 8.6 9.0 8.6     Recent Labs     24  0440 24  0142 24  0146   WBC 10.6 10.4 9.1   RBC 4.35* 4.55* 4.08*   HEMOGLOBIN 13.1* 13.5* 12.3*   HEMATOCRIT 41.1* 42.7 37.5*   MCV 94.5 93.8 91.9   MCH 30.1 29.7 30.1   MCHC 31.9* 31.6* 32.8   RDW 57.7* 57.3* 52.9*   PLATELETCT 244 250 231   MPV 10.9  11.6 11.3       ASSESSMENT/PLAN WITH SHARED DECISION MAKING:   Review  Pertinent imaging reviewed.    PHYSICAL ASPECTS OF CARE  Palliative Performance Scale: 30% currently, unknown previous PPS    # Hypertensive crisis  # Hypernatremia  # Ileus  # Multiple lacunar infarcts  # Functional debility  # Bacteremia  #Acute kidney injury  #Acute metabolic encephalopathy  #HFrEF  #Acute hypoxic respiratory failure  #Positive urine drug screen-methamphetamines  #Essential hypertension  #Delirium/frailty/geriatric syndrome    SOCIAL ASPECTS OF CARE  All information obtained from daughter Cheyenne.  Patient resides alone and was found down covered in his own feces for unknown number of days.  Daughter states that he is a former  and may be had a [employer] business.  He was born in [place] and grew up in [place].  He had 2 daughters 1 of whom is passed away.  Cheyenne states that she has been estranged from her dad given his behaviors.  She states up until years ago his life was fairly normal and then he had a back surgery.  She states after that time he had difficulties with pain and often times overmedicated or self medicated.  Patient also has a brother who lives in [place] whose name is Ruchi Sanchez at [phone].  Unknown level of ADL dependence prior to hospital stay.    SPIRITUAL ASPECTS OF CARE   Daughter states that patient is a [employer] and believes firmly in Jerrell.    GOALS OF CARE/SERIOUS ILLNESS CONVERSATION  Patient seen at bedside with nursing staff.  He is agitated, does not follow any commands, does not make eye contact, is noncommunicative other than groaning with any movement.  Assisted with repositioning patient.  He pushes against staff when turning onto his right side.  He seems to be weaker on his right side.    Telephone call placed to Cheyenne Barlow, patient's daughter.  Background information obtained from Josiah Quinn relays story her uncle told her regarding recent visit with  patient prior to hospital stay.  She reports that patient told his brother Buddy that he was tired of this and ready to meet Jerrell.  Explored understanding of patient's current condition to which she has good understanding.  Updated her on current status of tube feeding and post possibility patient would need permanent tube.  Discussed and introduced philosophy of hospice and the patient would qualify for hospice given strokes and current level of debility.  As such, discussed CODE STATUS.  Given current state, functional status, altered mentation, recommend patient be DNR/DNI.  Cheyenne agrees with this and agrees to transition patient to DNR/DNI.  Cheyenne provides provider with uncle's name and phone number.  Additionally, she gives permission for provider to contact on-call to discuss patient's condition.  Cheyenne understands that it will be up to her moving forward to make decisions regarding her dad's care as she is legally recognized surrogate decision maker.    4/3/2024: TC placed to patient's brother, Jarrell Sanchez yesterday w/o answer; left message. TC placed today to Cheyenne, patient's daughter. She has agreed to transition patient to comfort care with hospice DC plan. Discussed possibility of patient dying in the hospital. She verbalizes understanding of this. She is agreeable to plan. Communicated same to team.      Code Status: DNR/DNI    ACP Documents: Will need POLST prior to discharge     minutes spent discussing advance care planning, this time excludes any other billed services.    I spent a total of 38 minutes reviewing medical records, direct face-to-face time with the patient and/or family, documentation and coordination of care. This is separate from the time spent on advance care planning, which is documented above.    Lindsay Au, MSN, APRN, ACNPC-AG.  Palliative Care Nurse Practitioner  [phone]

## 2024-04-03 NOTE — PROGRESS NOTES
Report received from offgoing RN    Pt A&Ox0, resting in bed. Bed exit on. Oxygen in place.     Bed on lowest setting.

## 2024-04-03 NOTE — PROGRESS NOTES
Hospital Medicine Daily Progress Note    Date of Service  4/3/2024    Chief Complaint  Andrews Sanchez is a 63 y.o. male admitted 3/24/2024 after he found down    Hospital Course    63 y.o. male with past medical history of hypertension hypercholesterolemia who presented 3/24/2024 after he found down for an unknown amount of 9 and he was covered in feces.  Patient found to have significant elevated blood pressure and as per the chart review the blood pressure was 226/143 on upon arrival to [facility].  He was diagnosed with hypertensive crisis, hypoxic respiratory failure, acute encephalopathy and acute kidney injury.  On imaging studies patient found to have 1.2 mm left frontal hyperdensity.  He underwent MRI brain that showed diffuse confluent FLAIR.  He was placed on Precedex gtt. for ongoing agitation and also he was placed on nicardipine infusion for uncontrolled hypertension.     On March 26, 2024 intensivist Dr. Carrera requested to transfer care to hospitalist service.    Interval Problem Update    03/27/24    I evaluated and examined him at the bedside.  Blood pressure still running on the higher side.  I added hydralazine.  Due to patient renal functions I did not ACE or ARB's.  He also found to have mild bradycardia and I did not add beta-blocker.  He still requiring Cardizem gtt.  I am continuing and titrating as per target blood pressure of 120-150.  He is still requiring Precedex gtt. for ongoing agitation.  I am continuing and titrating as per RASS of -1 to +1.  I requested consultation from cardiology for PAULINE as recommended by neurology to evaluate for infective endocarditis.  I discussed plan of care with neurologist Dr. Zapata.  I attempted to call patient's daughter but it was the wrong number.  I ordered CTA head and neck as recommended by neurology.  I evaluated multiple times throughout the day.  Plan is to get feeding tube for nutrition as well as for medication.    03/28/24    I have  noted and examined him at the bedside.  He remains critically ill and not following commands but  He underwent feeding tube placement.  I reviewed CT head and neck that did not show any acute normalities per  I called patient's daughter Cheyenne and I discussed plan of care with the and answered all her questions.  He is still requiring significant amount of nicardipine gtt. I am continuing and titrating nicardipine gtt. as per target systolic blood pressure of 120 to 150 mmHg.  I am also continuing titrating Precedex gtt. as per RASS of -1 to +1.  I personally discussed plan of care with neurologist Dr. Zapata.    03/29/24    I evaluated and examined him at the bedside.  He remains confused.  He attempted to pull his feeding tube.  He is in 2 point soft restraints.  Now blood pressure has significantly improved.  He is still on Precedex.  I am continuing and titrating Precedex as per RASS of -1 to +1.  He is still on a Cardizem gtt. plan is to wean him off from nicardipine and try to manage blood pressure with oral antihypertensive as now we have feeding tube.  Current sodium level is 147.  I started him on free water flushes from feeding tube.  I ordered repeat BMP for this afternoon.  I increase the dose of hydralazine.    03/30/24    I evaluated and examined him at the bedside.  He is still remaining confused and not following commands.  Blood pressure still running on the higher side now he was placed on diltiazem gtt. this morning I discontinued diltiazem due to low ejection fraction and I placed him on Cleviprex  Now he is off from Precedex gtt.  Cardiology has been following him.  Later today he was agitated and looks like labored breathing after ABG that did not show any acute normalities.  I also ordered 1 dose of IV morphine due to concern for pain    03/31/24    I evaluated and examined him at the bedside.  He remains off-and-on agitated.  I evaluated him multiple times throughout the day.  I  ordered IV Versed and that helped and his blood pressure.  I also scheduled him for pain medication and he does look like he is in pain.  Overall poor prognosis as he is not responding to the treatment and he remains significantly confused due to his significant brain injury and low EF.  Blood pressure has been fluctuating.  Current white blood cell count is 12.0, hemoglobin of 12.4 and platelet count of 235.  Sodium has been increasing current sodium level is 152, renal function slightly improved current BUN is 34 and creatinine of 1.52.  I increased free water flushes and plan is to recheck BMP later today.  I added amlodipine 5 mg today for better blood pressure control.  Patient is on several blood pressure medications and blood pressure still running on the higher side.    04/02/24    I evaluated and examined him at the bedside.  He remains in distress.  Sodium level has been trending up.  I started him on D5W.  Blood pressure now slightly improved on multiple blood pressure medications   I am continuing his scheduled pain medications.  I called patient's daughter Cheyenne and updated her regarding Mr. Sanchez current medical condition and plan of care.  I discussed with him about advance care planning and CODE STATUS.  I explained that the difference between full code, DNR and comfort care measures.  She expressed that she will discuss it with patient's brother as well who lives in [place].  Now plan is to transfer him out from [facility].  4/2: Patient seen and examined resting in bed, confused,ws on tube but was held cause he was vomiting.  His HTN not well controlled so increased his Amlodipine   His prognosis is poor so palliative has been consulted and will discus with family   Appreciate rec.   4/3: Patient seen and examined, still agitated and confused. Palliative team following and case discussed. Palliative team had another discussion with patient daughter today and decision was made to transition patient to  comfort care and hospice     I have discussed this patient's plan of care and discharge plan at IDT rounds today with Case Management, Nursing, Nursing leadership, and other members of the IDT team.    Consultants/Specialty  critical care    Code Status  Comfort Care/DNR    Disposition  Medically Cleared  I have placed the appropriate orders for post-discharge needs.    Review of Systems  Review of Systems   Unable to perform ROS: Mental acuity        Physical Exam  Temp:  [36.3 °C (97.3 °F)-37 °C (98.6 °F)] 36.8 °C (98.2 °F)  Pulse:  [51-64] 56  Resp:  [17-18] 18  BP: (162-185)/() 171/91  SpO2:  [95 %-99 %] 96 %    Physical Exam  Vitals reviewed.   Constitutional:       General: He is in acute distress.      Appearance: He is ill-appearing.      Comments: Soft restraints   HENT:      Head: Normocephalic and atraumatic. No contusion.      Right Ear: External ear normal.      Left Ear: External ear normal.      Nose: Nose normal.      Comments: Feeding tube     Mouth/Throat:      Pharynx: No oropharyngeal exudate.   Eyes:      General:         Right eye: No discharge.         Left eye: No discharge.      Pupils: Pupils are equal, round, and reactive to light.   Cardiovascular:      Rate and Rhythm: Normal rate and regular rhythm.      Heart sounds: No murmur heard.     No friction rub. No gallop.   Pulmonary:      Effort: Pulmonary effort is normal.      Breath sounds: No wheezing or rhonchi.   Abdominal:      General: Bowel sounds are normal. There is no distension.      Palpations: Abdomen is soft.      Tenderness: There is no abdominal tenderness. There is no rebound.   Musculoskeletal:         General: No swelling or tenderness. Normal range of motion.      Cervical back: No rigidity. No muscular tenderness.   Skin:     General: Skin is warm and dry.      Coloration: Skin is not jaundiced.   Neurological:      General: No focal deficit present.      Mental Status: He is alert. He is disoriented.       Cranial Nerves: No cranial nerve deficit.      Sensory: No sensory deficit.      Comments: Not following commands       I performed physical exam on April 1, 2024 it remains similar without any acute changes  Fluids    Intake/Output Summary (Last 24 hours) at 4/3/2024 1347  Last data filed at 4/3/2024 0615  Gross per 24 hour   Intake 2158.29 ml   Output 850 ml   Net 1308.29 ml       Laboratory  Recent Labs     04/01/24  0440 04/02/24  0142 04/03/24  0146   WBC 10.6 10.4 9.1   RBC 4.35* 4.55* 4.08*   HEMOGLOBIN 13.1* 13.5* 12.3*   HEMATOCRIT 41.1* 42.7 37.5*   MCV 94.5 93.8 91.9   MCH 30.1 29.7 30.1   MCHC 31.9* 31.6* 32.8   RDW 57.7* 57.3* 52.9*   PLATELETCT 244 250 231   MPV 10.9 11.6 11.3     Recent Labs     04/01/24  1247 04/02/24  0142 04/03/24  0146   SODIUM 152* 152* 145   POTASSIUM 4.2 4.2 3.9   CHLORIDE 119* 117* 111   CO2 25 23 24   GLUCOSE 125* 93 104*   BUN 35* 35* 25*   CREATININE 1.21 1.25 1.13   CALCIUM 8.6 9.0 8.6                       Imaging  [id number] VIEW   Final Result         No specific finding to suggest small bowel obstruction.      US-RENAL ARTERY DUPLEX COMP   Final Result      [id number] VIEW   Final Result      No evidence of bowel obstruction or constipation.      DX-ABDOMEN FOR TUBE PLACEMENT   Final Result         Gastric drainage tube with tip projecting over the expected area of the first portion duodenum.      DX-ABDOMEN FOR TUBE PLACEMENT   Final Result      The esophagogastric tube placed in the interval is coiled in the back of the patient's throat.      I, Dr. Thanh Brian, discussed the results of this examination directly by phone with Karen FRANKLIN on 3/29/2024 at 1706 hours.      DX-ABDOMEN FOR TUBE PLACEMENT   Final Result      The esophagogastric tube is coiled back upon itself in the distal third of the esophagus. Repositioning is recommended.      DX-ABDOMEN FOR TUBE PLACEMENT   Final Result      1. The nasogastric tube is coiled back upon itself in the distal  third of the esophagus; repositioning is recommended.   2. I, Dr. Thanh Brian, discussed the results of this examination directly by phone with RIGOBERTO Martinez on 3/29/2024 at 1549 hours.      DX-ABDOMEN FOR TUBE PLACEMENT   Final Result         1.  Air-filled distended loops of bowel are seen with reactive mucosal pattern in the upper abdomen, appearance suggests ileus or enteritis. Recommend radiographic followup to resolution to exclude progression to obstruction.   2.  Dobbhoff tube tip overlying the expected location of the pylorus or first duodenal segment.   3.  Cardiomegaly      CT-CTA NECK WITH & W/O-POST PROCESSING   Final Result      1.  No acute neck arterial abnormality detected. No significant arterial stenosis.      CT-CTA HEAD WITH & W/O-POST PROCESS   Final Result      1.  No acute arterial abnormality is detected.   2.  Severe white matter, brainstem and cerebellar hypoattenuation, indeterminate.   3.  2 mm density left frontal lobe suspicious for a small microhemorrhage.   4.  13 x 8 mm indeterminate focus of hypoattenuation in the right cerebellum.      MR-BRAIN-W/O   Final Result      1.  Diffuse confluent FLAIR hyperintense signal abnormality throughout the supratentorial white matter including posterior limbs internal capsules, subinsular white matter, as well as thalamic gray matter. Findings would be atypical and extreme for    chronic microvascular ischemic change. Toxic or metabolic leukoencephalopathy or consequence of other diffuse white matter insult including sequela of narcotic or other substance abuse might be considered. The extremely diffuse involvement would also be    very atypical for posterior reversible encephalopathy syndrome (PRES).   2.  There is also diffuse FLAIR signal involving the brainstem, middle cerebellar peduncles, and cerebellar white matter, presumably the same etiology as the supratentorial process.   3.  Innumerable supratentorial and posterior fossa foci of  microhemorrhage compatible with the given history of hypertension. One of these foci probably corresponds to the punctate focus of acute hemorrhagic density in the left frontal deep white matter    seen on the CT scan. This focus shows no significant surrounding vasogenic edema or mass effect.   4.  11 mm ovoid focus of FLAIR hyperintensity in the right cerebellar deep white matter likely representing encephalomalacia related to old infarct or other remote insult.   5.  Subcentimeter foci x3 of bright diffusion signal involving the left frontal deep white matter, left parietal deep white matter, and left occipital peritrigonal white matter respectively, consistent with acute lacunar size infarcts.      [id number] VIEW   Final Result      1.  No evidence of bowel obstruction.   2.  Lumbar fusion hardware and metallic clips. No other metallic foreign body seen.   3.  Severe degenerative changes bilateral hip joints with likely AVN of the bilateral femoral heads.      EC-ECHOCARDIOGRAM COMPLETE W/O CONT   Final Result      CT-HEAD W/O   Final Result   Addendum (preliminary) 1 of 1   VOALTE message of critical findings sent to Dr. Huizar at 3/24/2024 11:37    AM. I also received confirmation of receipt of VOALTE message back from    the provider.      Final      1.  2 mm focus of hyperdensity in the left frontal white matter. This could be a small hemorrhage versus other etiology as above.   2.  Advanced confluent nonspecific white matter hypoattenuation which is markedly abnormal. Recommend follow-up with brain MRI study.      Based solely on CT findings, the brain injury guideline category is mBIG 1.      SDH < 4mm   IPH < 4mm   SAH < 3 sulci and < 1mm      The original BIG retrospective analysis found radiographic progression in 0% of BIG 1 patients and 2.6% BIG 2.      Efforts are underway to contact referring physician with results at time of dictation.      DX-CHEST-PORTABLE (1 VIEW)   Final Result         Hazy  bibasilar opacities, left more than right, atelectasis or infection.           Assessment/Plan  * Hypertensive crisis- (present on admission)  Assessment & Plan  4/1/2024   History of HTN, not on home meds per chart review  UDS positive for meth, possible etiology  He was started on Cardizem gtt.  I am continuing Cardizem gtt. as per target systolic blood pressure of less than 150 mmHg.  His EF is low and if his blood pressure improved plan is to discontinue Cardizem gtt. as is not an ideal medication to use in the setting of low EF.  Blood pressure is still not under control.  I discontinued all drip for blood pressure control and I am increasing more oral medication and as needed medication to achieve better blood pressure control.  Now he is on clonidine and I also added amlodipine.  Continue hydralazine 50 mg every 8 hourly.  Now plan is to transfer him out from [facility].    His HTn still not well controlled so will increase amlodipine dose     Patient is now on comfort care and hospice       Advance care planning  Assessment & Plan  On April 1, 2024 I called patient's daughter Cheyenne and updated her regarding Mr. Sanchez current medical condition and plan of care.  I discussed with him about advance care planning and CODE STATUS.  I explained that the difference between full code, DNR and comfort care measures.  She expressed that she will discuss it with patient's brother as well who lives in [place].  Palliative care consult requested and currently is pending.  I discussed plan of care with nursing staff and charge RN.    Time spent 16 minutes    Hypernatremia  Assessment & Plan  Patient found to have hypernatremia.  I increase frequency and amount of free water flushes now I ordered every 4 hourly 200 cc of free water flushes.  Sodium level remains elevated.  I ordered repeat BMP to ensure improvement in his renal functions.  I started him on D5W  Plan is to recheck sodium level.    Ileus (HCC)  Assessment &  Plan  X-ray abdomen for tube placement showed air-filled distended loops of bowel are seen with reactive mucosal pattern in the upper abdomen, appearance suggest ileus or enteritis.  X-ray abdomen did not show acute abnormal findings.    Multiple lacunar infarcts (HCC)  Assessment & Plan  4/1/2024   MRI showed acute lacunar size infarcts.  Patient will need Zio patch at the time of discharge.  Ordered HbA1c and lipid profile.  I started him on aspirin and atorvastatin.  I reviewed CT head and neck that did not show any acute abnormalities.  After discussing it with neurology I started him on DVT prophylaxis with Lovenox.  I ordered non-tPA stroke order set.  He remains agitated.  I scheduled pain medications.  Overall prognosis is guarded as he has significant brain injury along with that he has severely compromised EF.  Now plan is to transfer him out from IM to telemetry floor.    Debility  Assessment & Plan  PT/OT consult requested    Bacteremia due to Gram-negative bacteria  Assessment & Plan  1/2 BC + for GNR  Patient was admitted at [facility] on 9/2022 and 12/2022 for left lower extremity abscess that grew enterobacter, enterococcus, deteriorates   No overt murmurs and TTE was unrevealing  Patient has hardware in his back but no back pain on physical exam  Unclear source  Culture came back as a contaminant and I discontinued antibiotic to avoid adverse effects.      Prolonged Q-T interval on ECG  Assessment & Plan  4/1/2024   QTc 503  Continue to monitor colitis and replace as needed.  Avoid QT prolonging agents as able  Continue to monitor closely on telemetry.    Abnormal imaging of central nervous system  Assessment & Plan  CT head w/o contrast revealing 2mm focus hyperdensity in left frontal white matter; vascular neurology evaluated patient in ED and think this is an incidental finding and not likely intraparenchymal hemorrhage  MRI completed on 3/25, multiple findings,   Neurology evaluated him and  "made recommendations.  Continue to monitor closely on telemetry.  Continue to monitor blood pressure.  I reviewed finding of EEG.  Now plan is to transfer him out to telemetry floor.    Hypophosphatemia  Assessment & Plan  4/1/2024   Continue to monitor and replace as needed.    Hypokalemia  Assessment & Plan  04/01/24     Continue to monitor and replace as needed.    FRANKLYN (acute kidney injury) (HCC)  Assessment & Plan  4/1/2024   Unclear baseline, possibly 1.0-1.1 however last stable Cr from 2022  UA relatively bland, protein loss noted  Limit nephrotoxins and renally dose as appropriate  Renal function slightly improved today.  I will repeat BMP at 1 PM.  Renal function has been improving.  If it will continue to improve we can potentially start him on ACE inhibitor's.  Continue to monitor closely.    Acute metabolic encephalopathy  Assessment & Plan  Unclear etiology, possibly multifactorial in setting of meth use, Wernicke's (history of alcohol use, unclear if current), PRES, acute hypoxic respiratory failure  CT head revealed 2mm focus hyperdensity (vascular neurology evaluated patient in ED and think finding is incidental rather than intraparenchymal hemorrhage)  I reviewed finding of MRI brain.    I discussed plan of care with neurologist.  Due to MRI finding neurologist recommended Zio patch at the time of discharge.  Started on empiric IV thiamine   Continue to monitor closely.  I reviewed EEG that showed \"Diffuse slowing of the background, suggestive of diffuse and/or multifocal cerebral dysfunction. This finding might be seen in a myriad of encephalopathies and in itself is a nonspecific finding.\"  Ongoing encephalopathy multifactorial due to hyponatremia and significant brain injury on imaging.  He is not following commands and remains in soft restraints.  I called patient's daughter and updated her.    Now on comfort care     Elevated troponin  Assessment & Plan  Possibly related to underlying demand " "ischemia with decreased renal clearance   Troponins trended  Continue monitor closely on telemetry      HFrEF (heart failure with reduced ejection fraction) (HCC)  Assessment & Plan  4/1/2024   No known history of heart failure, possibly meth induced  Echo (3/24): Per report, mild concentric LVH, EF estimated to be 25-30%   Likely initiation of GDMT once patient  is more stable.  Cardiology recommended recommendations.    Now on comfort care     Acute hypoxic respiratory failure (HCC)  Assessment & Plan  Unclear if the patient is on oxygen at home, no known history of COPD  Bibasilar opacities L > R, empirically started on Unasyn in the ED but later discontinued as etiology preferred to be pulmonary edema, overall decreasing O2 requirements  Continue monitor closely in IMCU.  Currently is requiring 3 L of oxygen to maintain oxygen saturation.  His oxygen saturation is significantly better is around 98%.  Requested bedside RN to titrate down oxygen as tolerated.  Now plan is to transfer him to telemetry floor    Patient has transitioned to comfort care     Positive urine drug screen- (present on admission)  Assessment & Plan  UDS positive for amphetamines, this problem dates back many years with prior positive urine drug screens.    Counseling when appropriate.    Essential hypertension- (present on admission)  Assessment & Plan  Blood pressure is poorly controlled please see \"hypertensive crisis\" for details.        Greater than 51 minutes spent prepping to see patient (e.g. review of tests) obtaining and/or reviewing separately obtained history. Performing a medically appropriate examination and/ evaluation.  Counseling and educating the patient/family/caregiver.  Ordering medications, tests, or procedures.  Referring and communicating with other health care professionals.  Documenting clinical information in EPIC.  Independently interpreting results and communicating results to patient/family/caregiver.  Care " coordination.      VTE prophylaxis: scd         I have performed a physical exam and reviewed and updated ROS and Plan today (4/3/2024). In review of yesterday's note (4/2/2024), there are no changes except as documented above.

## 2024-04-03 NOTE — CARE PLAN
The patient is Stable - Low risk of patient condition declining or worsening    Shift Goals  Clinical Goals: comfort, VSS  Patient Goals: POLINA  Family Goals: n/a    Progress made toward(s) clinical / shift goals:    Problem: Safety - Medical Restraint  Goal: Remains free of injury from restraints (Restraint for Interference with Medical Device)  Outcome: Progressing  Goal: Free from restraint(s) (Restraint for Interference with Medical Device)  Outcome: Progressing     Problem: Pain - Standard  Goal: Alleviation of pain or a reduction in pain to the patient’s comfort goal  Outcome: Progressing     Problem: Skin Integrity  Goal: Skin integrity is maintained or improved  Outcome: Progressing     Problem: Fall Risk  Goal: Patient will remain free from falls  Outcome: Progressing       Patient is not progressing towards the following goals:      Problem: Knowledge Deficit - Standard  Goal: Patient and family/care givers will demonstrate understanding of plan of care, disease process/condition, diagnostic tests and medications  Outcome: Not Met     Problem: Nutrition  Goal: Patient's nutritional and fluid intake will be adequate or improve  Outcome: Not Met

## 2024-04-03 NOTE — DISCHARGE PLANNING
MIGUEL Liaison called patients daughter Cheyenne to get Hospice choice. Per Cheyenne she is out of town and  she is not ready to make a choice. She doesn't have any plan ed discharge place  and stated that she can't take her father to her home. She said she doesn't have enough information about hospice and she hasn't received a recommendation from MD. I notified CM and she will request a phone consult from MD.

## 2024-04-03 NOTE — PROGRESS NOTES
Handoff report received from night shift nurse. Pt care assumed. Pt is currently resting in bed. POC discussed with Pt and Pt verbalizes no questions at this time. Pt is AAOx0, on 3L oxymask, on Tele monitoring, and VSS. Call light and belongings within reach, bed in lowest and locked position, and Pt educated on use of call light.

## 2024-04-04 PROCEDURE — 770001 HCHG ROOM/CARE - MED/SURG/GYN PRIV*

## 2024-04-04 PROCEDURE — 99231 SBSQ HOSP IP/OBS SF/LOW 25: CPT | Performed by: NURSE PRACTITIONER

## 2024-04-04 PROCEDURE — 700111 HCHG RX REV CODE 636 W/ 250 OVERRIDE (IP): Mod: JZ | Performed by: INTERNAL MEDICINE

## 2024-04-04 PROCEDURE — 97602 WOUND(S) CARE NON-SELECTIVE: CPT

## 2024-04-04 PROCEDURE — A9270 NON-COVERED ITEM OR SERVICE: HCPCS | Performed by: INTERNAL MEDICINE

## 2024-04-04 PROCEDURE — 700111 HCHG RX REV CODE 636 W/ 250 OVERRIDE (IP): Performed by: NURSE PRACTITIONER

## 2024-04-04 PROCEDURE — 99231 SBSQ HOSP IP/OBS SF/LOW 25: CPT | Performed by: INTERNAL MEDICINE

## 2024-04-04 PROCEDURE — 700102 HCHG RX REV CODE 250 W/ 637 OVERRIDE(OP): Performed by: INTERNAL MEDICINE

## 2024-04-04 RX ORDER — LORAZEPAM 2 MG/ML
1 INJECTION INTRAMUSCULAR EVERY 4 HOURS
Status: DISCONTINUED | OUTPATIENT
Start: 2024-04-04 | End: 2024-04-05 | Stop reason: HOSPADM

## 2024-04-04 RX ADMIN — MORPHINE SULFATE 5 MG: 10 INJECTION INTRAVENOUS at 14:14

## 2024-04-04 RX ADMIN — LORAZEPAM 1 MG: 2 INJECTION INTRAMUSCULAR; INTRAVENOUS at 14:14

## 2024-04-04 RX ADMIN — MORPHINE SULFATE 5 MG: 10 INJECTION INTRAVENOUS at 10:14

## 2024-04-04 RX ADMIN — LORAZEPAM 1 MG: 2 INJECTION INTRAMUSCULAR; INTRAVENOUS at 10:14

## 2024-04-04 RX ADMIN — Medication 50 MG: at 17:28

## 2024-04-04 RX ADMIN — MINERAL OIL, AND WHITE PETROLATUM 1 APPLICATION: 425; 573 OINTMENT OPHTHALMIC at 17:30

## 2024-04-04 RX ADMIN — MORPHINE SULFATE 5 MG: 10 INJECTION INTRAVENOUS at 00:59

## 2024-04-04 RX ADMIN — MINERAL OIL, AND WHITE PETROLATUM 1 APPLICATION: 425; 573 OINTMENT OPHTHALMIC at 11:46

## 2024-04-04 RX ADMIN — MORPHINE SULFATE 5 MG: 10 INJECTION INTRAVENOUS at 07:14

## 2024-04-04 RX ADMIN — LORAZEPAM 1 MG: 2 INJECTION INTRAMUSCULAR; INTRAVENOUS at 17:12

## 2024-04-04 RX ADMIN — LORAZEPAM 1 MG: 2 INJECTION INTRAMUSCULAR; INTRAVENOUS at 21:07

## 2024-04-04 ASSESSMENT — PAIN DESCRIPTION - PAIN TYPE
TYPE: ACUTE PAIN

## 2024-04-04 NOTE — WOUND TEAM
[facility]  Inpatient Services  Wound and Skin Care Brief Evaluation    Admission Date: 3/24/2024     Last order of IP CONSULT TO WOUND CARE was found on 4/2/2024 from Hospital Encounter on 3/24/2024     HPI, PMH, SH: Reviewed    Chief Complaint   Patient presents with    ALOC     Patient BIB EMS from home. Patient A/O x2 Person and place. Unknown last known well or baseline.     Head Injury     Blood on right side of head unknown last know well or time down at home. Patient found on ground by neighbor.     Hypertension     BP /150 given 5mg metoprolol IV by EMS per ERP orders.       Diagnosis: Hypertensive crisis [I16.9]    Unit where seen by Wound Team: T438/00     Wound consult placed regarding penis. Chart and images reviewed. This discussed with bedside RN. This clinician in to assess patient Penile shaft with small skin tear, can leave open to air and apply barrier paste until wound resolution.     No pressure injuries or advanced wound care needs identified. Wound consult completed. No further follow up unless indicated and consulted.          PREVENTATIVE INTERVENTIONS:    Q shift Shadi - performed per nursing policy  Q shift pressure point assessments - performed per nursing policy

## 2024-04-04 NOTE — DISCHARGE PLANNING
[facility] Liaison obtained Choice for [facility] via phone call with with daughter Cheyenne. Per Cheyenne she hasn't make a choice for placement.  She spoke with Dr Beyer today and she was told she has couple of days to make a decision.  Cheyenne is currently out of town and she doesn't  have access to her father's financial information.Cee REDD notified via Voalte.

## 2024-04-04 NOTE — THERAPY
Physical Therapy Contact Note    Patient Name: Andrews Sanchez  Age:  64 y.o., Sex:  male  Medical Record #: [id number]  Today's Date: 4/4/2024 04/04/24 1014   Interdisciplinary Plan of Care Collaboration   Collaboration Comments Patient transitioned to comfort care. Will complete PT orders at this time. Please reconsult if there is a change in status

## 2024-04-04 NOTE — PROGRESS NOTES
4 Eyes Skin Assessment Completed by RIGOBERTO Daugherty and RIGOBERTO Manning.    Head WDL  Ears R ear incision w/ sutures  Nose WDL  Mouth WDL  Neck WDL  Breast/Chest WDL  Shoulder Blades WDL  Spine WDL  (R) Arm/Elbow/Hand Swelling and Discoloration  (L) Arm/Elbow/Hand Swelling and Discoloration  Abdomen Bruising  Groin Skin tear to penis, Redness  Scrotum/Coccyx/Buttocks WDL  (R) Leg Discoloration  (L) Leg Discoloration  (R) Heel/Foot/Toe Calloused, Discoloration  (L) Heel/Foot/Toe Calloused, Discoloration          Devices In Places Pulse Ox and Ortho Boot/Shoe, SCDs      Interventions In Place Heel Float Boots, TAP System, Pillows, Q2 Turns, Low Air Loss Mattress, and Barrier Cream    Possible Skin Injury No    Pictures Uploaded Into Epic Yes  Wound Consult Placed N/A  RN Wound Prevention Protocol Ordered No '

## 2024-04-04 NOTE — PROGRESS NOTES
Hospital Medicine Daily Progress Note    Date of Service  4/4/2024    Chief Complaint  Andrews Sanchez is a 63 y.o. male admitted 3/24/2024 after he found down    Hospital Course    63 y.o. male with past medical history of hypertension hypercholesterolemia who presented 3/24/2024 after he found down for an unknown amount of 9 and he was covered in feces.  Patient found to have significant elevated blood pressure and as per the chart review the blood pressure was 226/143 on upon arrival to [facility].  He was diagnosed with hypertensive crisis, hypoxic respiratory failure, acute encephalopathy and acute kidney injury.  On imaging studies patient found to have 1.2 mm left frontal hyperdensity.  He underwent MRI brain that showed diffuse confluent FLAIR.  He was placed on Precedex gtt. for ongoing agitation and also he was placed on nicardipine infusion for uncontrolled hypertension.     On March 26, 2024 intensivist Dr. Carrera requested to transfer care to hospitalist service.    Interval Problem Update    03/27/24    I evaluated and examined him at the bedside.  Blood pressure still running on the higher side.  I added hydralazine.  Due to patient renal functions I did not ACE or ARB's.  He also found to have mild bradycardia and I did not add beta-blocker.  He still requiring Cardizem gtt.  I am continuing and titrating as per target blood pressure of 120-150.  He is still requiring Precedex gtt. for ongoing agitation.  I am continuing and titrating as per RASS of -1 to +1.  I requested consultation from cardiology for PAULINE as recommended by neurology to evaluate for infective endocarditis.  I discussed plan of care with neurologist Dr. Zapata.  I attempted to call patient's daughter but it was the wrong number.  I ordered CTA head and neck as recommended by neurology.  I evaluated multiple times throughout the day.  Plan is to get feeding tube for nutrition as well as for medication.    03/28/24    I have  noted and examined him at the bedside.  He remains critically ill and not following commands but  He underwent feeding tube placement.  I reviewed CT head and neck that did not show any acute normalities per  I called patient's daughter Cheyenne and I discussed plan of care with the and answered all her questions.  He is still requiring significant amount of nicardipine gtt. I am continuing and titrating nicardipine gtt. as per target systolic blood pressure of 120 to 150 mmHg.  I am also continuing titrating Precedex gtt. as per RASS of -1 to +1.  I personally discussed plan of care with neurologist Dr. Zapata.    03/29/24    I evaluated and examined him at the bedside.  He remains confused.  He attempted to pull his feeding tube.  He is in 2 point soft restraints.  Now blood pressure has significantly improved.  He is still on Precedex.  I am continuing and titrating Precedex as per RASS of -1 to +1.  He is still on a Cardizem gtt. plan is to wean him off from nicardipine and try to manage blood pressure with oral antihypertensive as now we have feeding tube.  Current sodium level is 147.  I started him on free water flushes from feeding tube.  I ordered repeat BMP for this afternoon.  I increase the dose of hydralazine.    03/30/24    I evaluated and examined him at the bedside.  He is still remaining confused and not following commands.  Blood pressure still running on the higher side now he was placed on diltiazem gtt. this morning I discontinued diltiazem due to low ejection fraction and I placed him on Cleviprex  Now he is off from Precedex gtt.  Cardiology has been following him.  Later today he was agitated and looks like labored breathing after ABG that did not show any acute normalities.  I also ordered 1 dose of IV morphine due to concern for pain    03/31/24    I evaluated and examined him at the bedside.  He remains off-and-on agitated.  I evaluated him multiple times throughout the day.  I  ordered IV Versed and that helped and his blood pressure.  I also scheduled him for pain medication and he does look like he is in pain.  Overall poor prognosis as he is not responding to the treatment and he remains significantly confused due to his significant brain injury and low EF.  Blood pressure has been fluctuating.  Current white blood cell count is 12.0, hemoglobin of 12.4 and platelet count of 235.  Sodium has been increasing current sodium level is 152, renal function slightly improved current BUN is 34 and creatinine of 1.52.  I increased free water flushes and plan is to recheck BMP later today.  I added amlodipine 5 mg today for better blood pressure control.  Patient is on several blood pressure medications and blood pressure still running on the higher side.    04/02/24    I evaluated and examined him at the bedside.  He remains in distress.  Sodium level has been trending up.  I started him on D5W.  Blood pressure now slightly improved on multiple blood pressure medications   I am continuing his scheduled pain medications.  I called patient's daughter Cheyenne and updated her regarding Mr. Sanchez current medical condition and plan of care.  I discussed with him about advance care planning and CODE STATUS.  I explained that the difference between full code, DNR and comfort care measures.  She expressed that she will discuss it with patient's brother as well who lives in [place].  Now plan is to transfer him out from [facility].  4/2: Patient seen and examined resting in bed, confused,ws on tube but was held cause he was vomiting.  His HTN not well controlled so increased his Amlodipine   His prognosis is poor so palliative has been consulted and will discus with family   Appreciate rec.   4/3: Patient seen and examined, still agitated and confused. Palliative team following and case discussed. Palliative team had another discussion with patient daughter today and decision was made to transition patient to  comfort care and hospice   4/4: Patient seen and examined, afebrile now on comfort care, called his daughter today and updated on condition  I have discussed this patient's plan of care and discharge plan at IDT rounds today with Case Management, Nursing, Nursing leadership, and other members of the IDT team.    Consultants/Specialty  critical care    Code Status  Comfort Care/DNR    Disposition  Medically Cleared  I have placed the appropriate orders for post-discharge needs.    Review of Systems  Review of Systems   Unable to perform ROS: Mental acuity        Physical Exam  Temp:  [36.6 °C (97.9 °F)-36.7 °C (98.1 °F)] 36.6 °C (97.9 °F)  Pulse:  [61-65] 61  Resp:  [18] 18  BP: (160-198)/() 198/115  SpO2:  [83 %-87 %] 87 %    Physical Exam  Vitals reviewed.   Constitutional:       General: He is in acute distress.      Appearance: He is ill-appearing.      Comments: Soft restraints   HENT:      Head: Normocephalic and atraumatic. No contusion.      Right Ear: External ear normal.      Left Ear: External ear normal.      Nose: Nose normal.      Comments: Feeding tube     Mouth/Throat:      Pharynx: No oropharyngeal exudate.   Eyes:      General:         Right eye: No discharge.         Left eye: No discharge.      Pupils: Pupils are equal, round, and reactive to light.   Cardiovascular:      Rate and Rhythm: Normal rate and regular rhythm.      Heart sounds: No murmur heard.     No friction rub. No gallop.   Pulmonary:      Effort: Pulmonary effort is normal.      Breath sounds: No wheezing or rhonchi.   Abdominal:      General: Bowel sounds are normal. There is no distension.      Palpations: Abdomen is soft.      Tenderness: There is no abdominal tenderness. There is no rebound.   Musculoskeletal:         General: No swelling or tenderness. Normal range of motion.      Cervical back: No rigidity. No muscular tenderness.   Skin:     General: Skin is warm and dry.      Coloration: Skin is not jaundiced.    Neurological:      General: No focal deficit present.      Mental Status: He is alert. He is disoriented.      Cranial Nerves: No cranial nerve deficit.      Sensory: No sensory deficit.      Comments: Not following commands       I performed physical exam on April 1, 2024 it remains similar without any acute changes  Fluids    Intake/Output Summary (Last 24 hours) at 4/4/2024 1210  Last data filed at 4/4/2024 0600  Gross per 24 hour   Intake 0 ml   Output 550 ml   Net -550 ml       Laboratory  Recent Labs     04/02/24  0142 04/03/24  0146   WBC 10.4 9.1   RBC 4.55* 4.08*   HEMOGLOBIN 13.5* 12.3*   HEMATOCRIT 42.7 37.5*   MCV 93.8 91.9   MCH 29.7 30.1   MCHC 31.6* 32.8   RDW 57.3* 52.9*   PLATELETCT 250 231   MPV 11.6 11.3     Recent Labs     04/01/24  1247 04/02/24  0142 04/03/24  0146   SODIUM 152* 152* 145   POTASSIUM 4.2 4.2 3.9   CHLORIDE 119* 117* 111   CO2 25 23 24   GLUCOSE 125* 93 104*   BUN 35* 35* 25*   CREATININE 1.21 1.25 1.13   CALCIUM 8.6 9.0 8.6                       Imaging  [id number] VIEW   Final Result         No specific finding to suggest small bowel obstruction.      US-RENAL ARTERY DUPLEX COMP   Final Result      NZ-[id number]-0 VIEW   Final Result      No evidence of bowel obstruction or constipation.      DX-ABDOMEN FOR TUBE PLACEMENT   Final Result         Gastric drainage tube with tip projecting over the expected area of the first portion duodenum.      DX-ABDOMEN FOR TUBE PLACEMENT   Final Result      The esophagogastric tube placed in the interval is coiled in the back of the patient's throat.      I, Dr. Thanh Brian, discussed the results of this examination directly by phone with Karen FRANKLIN on 3/29/2024 at 1706 hours.      DX-ABDOMEN FOR TUBE PLACEMENT   Final Result      The esophagogastric tube is coiled back upon itself in the distal third of the esophagus. Repositioning is recommended.      DX-ABDOMEN FOR TUBE PLACEMENT   Final Result      1. The nasogastric tube is coiled  back upon itself in the distal third of the esophagus; repositioning is recommended.   2. I, Dr. Thanh Brian, discussed the results of this examination directly by phone with RIGOBERTO Martinez on 3/29/2024 at 1549 hours.      DX-ABDOMEN FOR TUBE PLACEMENT   Final Result         1.  Air-filled distended loops of bowel are seen with reactive mucosal pattern in the upper abdomen, appearance suggests ileus or enteritis. Recommend radiographic followup to resolution to exclude progression to obstruction.   2.  Dobbhoff tube tip overlying the expected location of the pylorus or first duodenal segment.   3.  Cardiomegaly      CT-CTA NECK WITH & W/O-POST PROCESSING   Final Result      1.  No acute neck arterial abnormality detected. No significant arterial stenosis.      CT-CTA HEAD WITH & W/O-POST PROCESS   Final Result      1.  No acute arterial abnormality is detected.   2.  Severe white matter, brainstem and cerebellar hypoattenuation, indeterminate.   3.  2 mm density left frontal lobe suspicious for a small microhemorrhage.   4.  13 x 8 mm indeterminate focus of hypoattenuation in the right cerebellum.      MR-BRAIN-W/O   Final Result      1.  Diffuse confluent FLAIR hyperintense signal abnormality throughout the supratentorial white matter including posterior limbs internal capsules, subinsular white matter, as well as thalamic gray matter. Findings would be atypical and extreme for    chronic microvascular ischemic change. Toxic or metabolic leukoencephalopathy or consequence of other diffuse white matter insult including sequela of narcotic or other substance abuse might be considered. The extremely diffuse involvement would also be    very atypical for posterior reversible encephalopathy syndrome (PRES).   2.  There is also diffuse FLAIR signal involving the brainstem, middle cerebellar peduncles, and cerebellar white matter, presumably the same etiology as the supratentorial process.   3.  Innumerable  supratentorial and posterior fossa foci of microhemorrhage compatible with the given history of hypertension. One of these foci probably corresponds to the punctate focus of acute hemorrhagic density in the left frontal deep white matter    seen on the CT scan. This focus shows no significant surrounding vasogenic edema or mass effect.   4.  11 mm ovoid focus of FLAIR hyperintensity in the right cerebellar deep white matter likely representing encephalomalacia related to old infarct or other remote insult.   5.  Subcentimeter foci x3 of bright diffusion signal involving the left frontal deep white matter, left parietal deep white matter, and left occipital peritrigonal white matter respectively, consistent with acute lacunar size infarcts.      NB-[id number]-3 VIEW   Final Result      1.  No evidence of bowel obstruction.   2.  Lumbar fusion hardware and metallic clips. No other metallic foreign body seen.   3.  Severe degenerative changes bilateral hip joints with likely AVN of the bilateral femoral heads.      EC-ECHOCARDIOGRAM COMPLETE W/O CONT   Final Result      CT-HEAD W/O   Final Result   Addendum (preliminary) 1 of 1   VOALTE message of critical findings sent to Dr. Huizar at 3/24/2024 11:37    AM. I also received confirmation of receipt of VOALTE message back from    the provider.      Final      1.  2 mm focus of hyperdensity in the left frontal white matter. This could be a small hemorrhage versus other etiology as above.   2.  Advanced confluent nonspecific white matter hypoattenuation which is markedly abnormal. Recommend follow-up with brain MRI study.      Based solely on CT findings, the brain injury guideline category is mBIG 1.      SDH < 4mm   IPH < 4mm   SAH < 3 sulci and < 1mm      The original BIG retrospective analysis found radiographic progression in 0% of BIG 1 patients and 2.6% BIG 2.      Efforts are underway to contact referring physician with results at time of dictation.       DX-CHEST-PORTABLE (1 VIEW)   Final Result         Hazy bibasilar opacities, left more than right, atelectasis or infection.           Assessment/Plan  * Hypertensive crisis- (present on admission)  Assessment & Plan  4/1/2024   History of HTN, not on home meds per chart review  UDS positive for meth, possible etiology  He was started on Cardizem gtt.  I am continuing Cardizem gtt. as per target systolic blood pressure of less than 150 mmHg.  His EF is low and if his blood pressure improved plan is to discontinue Cardizem gtt. as is not an ideal medication to use in the setting of low EF.  Blood pressure is still not under control.  I discontinued all drip for blood pressure control and I am increasing more oral medication and as needed medication to achieve better blood pressure control.  Now he is on clonidine and I also added amlodipine.  Continue hydralazine 50 mg every 8 hourly.  Now plan is to transfer him out from [facility].    His HTn still not well controlled so will increase amlodipine dose     Patient is now on comfort care and hospice       Advance care planning  Assessment & Plan  On April 1, 2024 I called patient's daughter Cheyenne and updated her regarding Mr. Sanchez current medical condition and plan of care.  I discussed with him about advance care planning and CODE STATUS.  I explained that the difference between full code, DNR and comfort care measures.  She expressed that she will discuss it with patient's brother as well who lives in [place].  Palliative care consult requested and currently is pending.  I discussed plan of care with nursing staff and charge RN.    Time spent 16 minutes    Hypernatremia  Assessment & Plan  Patient found to have hypernatremia.  I increase frequency and amount of free water flushes now I ordered every 4 hourly 200 cc of free water flushes.  Sodium level remains elevated.  I ordered repeat BMP to ensure improvement in his renal functions.  I started him on D5W  Plan is  to recheck sodium level.    Ileus (HCC)  Assessment & Plan  X-ray abdomen for tube placement showed air-filled distended loops of bowel are seen with reactive mucosal pattern in the upper abdomen, appearance suggest ileus or enteritis.  X-ray abdomen did not show acute abnormal findings.    Multiple lacunar infarcts (HCC)  Assessment & Plan  4/1/2024   MRI showed acute lacunar size infarcts.  Patient will need Zio patch at the time of discharge.  Ordered HbA1c and lipid profile.  I started him on aspirin and atorvastatin.  I reviewed CT head and neck that did not show any acute abnormalities.  After discussing it with neurology I started him on DVT prophylaxis with Lovenox.  I ordered non-tPA stroke order set.  He remains agitated.  I scheduled pain medications.  Overall prognosis is guarded as he has significant brain injury along with that he has severely compromised EF.  Now plan is to transfer him out from [facility] to telemetry floor.    Debility  Assessment & Plan  PT/OT consult requested    Bacteremia due to Gram-negative bacteria  Assessment & Plan  1/2 BC + for GNR  Patient was admitted at [facility] on 9/2022 and 12/2022 for left lower extremity abscess that grew enterobacter, enterococcus, deteriorates   No overt murmurs and TTE was unrevealing  Patient has hardware in his back but no back pain on physical exam  Unclear source  Culture came back as a contaminant and I discontinued antibiotic to avoid adverse effects.      Prolonged Q-T interval on ECG  Assessment & Plan  4/1/2024   QTc 503  Continue to monitor colitis and replace as needed.  Avoid QT prolonging agents as able  Continue to monitor closely on telemetry.    Abnormal imaging of central nervous system  Assessment & Plan  CT head w/o contrast revealing 2mm focus hyperdensity in left frontal white matter; vascular neurology evaluated patient in ED and think this is an incidental finding and not likely intraparenchymal hemorrhage  MRI completed on  "3/25, multiple findings,   Neurology evaluated him and made recommendations.  Continue to monitor closely on telemetry.  Continue to monitor blood pressure.  I reviewed finding of EEG.  Now plan is to transfer him out to telemetry floor.    Hypophosphatemia  Assessment & Plan  4/1/2024   Continue to monitor and replace as needed.    Hypokalemia  Assessment & Plan  04/01/24     Continue to monitor and replace as needed.    FRANKLYN (acute kidney injury) (HCC)  Assessment & Plan  4/1/2024   Unclear baseline, possibly 1.0-1.1 however last stable Cr from 2022  UA relatively bland, protein loss noted  Limit nephrotoxins and renally dose as appropriate  Renal function slightly improved today.  I will repeat BMP at 1 PM.  Renal function has been improving.  If it will continue to improve we can potentially start him on ACE inhibitor's.  Continue to monitor closely.    Acute metabolic encephalopathy  Assessment & Plan  Unclear etiology, possibly multifactorial in setting of meth use, Wernicke's (history of alcohol use, unclear if current), PRES, acute hypoxic respiratory failure  CT head revealed 2mm focus hyperdensity (vascular neurology evaluated patient in ED and think finding is incidental rather than intraparenchymal hemorrhage)  I reviewed finding of MRI brain.    I discussed plan of care with neurologist.  Due to MRI finding neurologist recommended Zio patch at the time of discharge.  Started on empiric IV thiamine   Continue to monitor closely.  I reviewed EEG that showed \"Diffuse slowing of the background, suggestive of diffuse and/or multifocal cerebral dysfunction. This finding might be seen in a myriad of encephalopathies and in itself is a nonspecific finding.\"  Ongoing encephalopathy multifactorial due to hyponatremia and significant brain injury on imaging.  He is not following commands and remains in soft restraints.  I called patient's daughter and updated her.    Now on comfort care     Elevated " "troponin  Assessment & Plan  Possibly related to underlying demand ischemia with decreased renal clearance   Troponins trended  Continue monitor closely on telemetry      HFrEF (heart failure with reduced ejection fraction) (HCC)  Assessment & Plan  4/1/2024   No known history of heart failure, possibly meth induced  Echo (3/24): Per report, mild concentric LVH, EF estimated to be 25-30%   Likely initiation of GDMT once patient  is more stable.  Cardiology recommended recommendations.    Now on comfort care     Acute hypoxic respiratory failure (HCC)  Assessment & Plan  Unclear if the patient is on oxygen at home, no known history of COPD  Bibasilar opacities L > R, empirically started on Unasyn in the ED but later discontinued as etiology preferred to be pulmonary edema, overall decreasing O2 requirements  Continue monitor closely in IMCU.  Currently is requiring 3 L of oxygen to maintain oxygen saturation.  His oxygen saturation is significantly better is around 98%.  Requested bedside RN to titrate down oxygen as tolerated.  Now plan is to transfer him to telemetry floor    Patient has transitioned to comfort care     Positive urine drug screen- (present on admission)  Assessment & Plan  UDS positive for amphetamines, this problem dates back many years with prior positive urine drug screens.    Counseling when appropriate.    Essential hypertension- (present on admission)  Assessment & Plan  Blood pressure is poorly controlled please see \"hypertensive crisis\" for details.        Greater than 51 minutes spent prepping to see patient (e.g. review of tests) obtaining and/or reviewing separately obtained history. Performing a medically appropriate examination and/ evaluation.  Counseling and educating the patient/family/caregiver.  Ordering medications, tests, or procedures.  Referring and communicating with other health care professionals.  Documenting clinical information in EPIC.  Independently interpreting " results and communicating results to patient/family/caregiver.  Care coordination.      VTE prophylaxis: scd         I have performed a physical exam and reviewed and updated ROS and Plan today (4/4/2024). In review of yesterday's note (4/3/2024), there are no changes except as documented above.

## 2024-04-04 NOTE — PROGRESS NOTES
Pt admitted to room T438 via transport in hospital bed from T7 at 0043. Report received from T7 RN.      Nonverbal pain scale used. Medicated per MAR.  A&Ox 0.   NPO diet.  + void. Last BM PTA  Mobility: bed bound, max assist turning  See 2 RN skin note.  Comfort Care.    All needs met at this time. Call light and belongings within reach. Bed low and locked. Non skid socks in place. Hourly rounding in place.

## 2024-04-04 NOTE — THERAPY
Speech Language Therapy Contact Note    Patient Name: Andrews Sanchez  Age:  64 y.o., Sex:  male  Medical Record #: [id number]  Today's Date: 4/4/2024 04/04/24 0947   Treatment Variance   Reason For Missed Therapy Non-Medical - Other (Please Comment)   Interdisciplinary Plan of Care Collaboration   Collaboration Comments Per chart review, pt has transitioned to comfort care measures. SLP will sign off. Please re-consult with change in status.

## 2024-04-04 NOTE — DIETARY
Nutrition Services: Brief Update    RD following pt for tube feeding.  Per chart review, pt transitioned to comfort care yesterday.  Enteral tube has been removed. Tube feeding order to be discontinued.    RD available PRN, consult RD as needed.

## 2024-04-04 NOTE — CARE PLAN
Problem: Pain - Standard  Goal: Alleviation of pain or a reduction in pain to the patient’s comfort goal  Outcome: Progressing     Problem: Skin Integrity  Goal: Skin integrity is maintained or improved  Outcome: Progressing     Problem: Fall Risk  Goal: Patient will remain free from falls  Outcome: Progressing   The patient is Stable - Low risk of patient condition declining or worsening    Shift Goals  Clinical Goals: comfort, pain management  Patient Goals: POLINA  Family Goals: n/a    Progress made toward(s) clinical / shift goals:  Nonverbal scale used to assess pain, managed per MAR, sleeping comfortably. Patient free from falls at this time. Skin assessed, heel float boots on, q2 turns in place.    Patient is not progressing towards the following goals:

## 2024-04-04 NOTE — PROGRESS NOTES
Received bedside report from RN, pt care assumed, VSS, pt assessment complete. Pt AOx0, with no signs of pain noted. No signs of acute distress noted at this time. Plan of care discussed with pt and verbalizes no questions. Pt denies any additional needs at this time. Bed locked/in lowest position, bed alarm on, pt educated on fall risk and verbalized understanding, call light within reach, hourly rounding initiated.

## 2024-04-04 NOTE — PROGRESS NOTES
SUMMARY:1624: ongoing poor pain control, d/w nurse, orders for morphine drip placed.   patient with clinical s/s pain-scheduled morphine and lorazepam IVP, may need drip. PC to continue to follow.       MRN: [id number]  Date of palliative consult: 3/30/2024  Reason for consult: ACP/GOC  Referring provider: Elizabeth  Location of consult: Tele [id number]  Additional consulting services: Cardiology, hospital medicine, medicine and rehab, neurology    HPI:   Andrews Sanchez is a 64 y.o. male with past medical history of hypertension and previous admissions for hypertensive crisis and wound care who presented on 3/24/2024 send EMS transport brought in from home with a head injury and unknown downtime (patient was found down on the ground by a neighbor), and hypertension (BPA prior to arrival 260/150 given 5 mg of metoprolol IV by EMS per ER physician orders).  Patient was also noted to be covered in his feces, malodorous, disheveled, with bilateral scleral injection.  Chest x-ray revealed concern for infection versus atelectasis patient was treated empirically with Unasyn and azithromycin.  CT head notable for 2 mm focus hyperdensity, vascular neurology consulted in emergency department and believe this is an incidental finding and not an intraparenchymal hemorrhage.    Patient  increasingly became agitated with hypertensive crisis, hypoxic respiratory failure, and acute encephalopathy as well as acute kidney injury.  He was admitted to care unit and placed on a Precedex drip for ongoing agitation as well as nicardipine infusion for uncontrolled hypertension.    Patient was subsequently transferred out of ICU on 3/27/2024 to [facility] [place] and on 4/1 was transferred to telemetry.    Significant/pertinent past medical history: Upper with a 3.2 total pack years, denies alcohol or drug use cell disorder. Hyperlipidemia, hypertension.    Pain History: Unable to assess  Onset:   Location:   Duration:   Characteristics:   Aggravating factors:    Alleviating factors:   Radiation:   Treatments:   Severity:     Additional symptoms: Unable to assess dyspnea, nausea, vomiting, constipation, fatigue, sleep, mood, appetite    Interval History: Remains intermittently agitated has IV Versed as needed.  Overall poor prognosis with significant confusion.  LVEF per most recent echocardiogram on 3/24/2024 indicates severe reduced left systolic function at 25 to 30%.    4/3: continues to be agitated, groaning and grimacing with stimulation. On 3l oxymask.      : reviewed VS and overnight status. Has been given 30 mg of IV morphine and 2 mg of IV Lorazepam. BP remains elevated with facial grimacing and groaning noted on exam today.     Medication Allergy/Sensitivities:  Allergies   Allergen Reactions    Bee Venom Swelling       ROS:    Review of Systems   Unable to perform ROS: Mental status change       PE:   Recent vital signs  BMI: Body mass index is 32.42 kg/m².    Temp (24hrs), Av.7 °C (98 °F), Min:36.6 °C (97.9 °F), Max:36.7 °C (98.1 °F)  Temperature: 36.6 °C (97.9 °F), Monitored Temp: 36.9 °C (98.4 °F)  Pulse  Av.5  Min: 37  Max: 96   Blood Pressure : (!) 198/115 (Rn notified and in room)       Physical Exam  Vitals and nursing note reviewed.   Constitutional:       General: He is not in acute distress.     Appearance: He is obese. He is ill-appearing.   Skin:     General: Skin is cool and dry.      Capillary Refill: Capillary refill takes 2 to 3 seconds.      Coloration: Skin is pale.   Neurological:      Mental Status: He is disoriented and confused.   Psychiatric:         Mood and Affect: Mood is anxious.         Speech: He is noncommunicative.         Behavior: Behavior is uncooperative, agitated and aggressive.         Cognition and Memory: Cognition is impaired. Memory is impaired.         Judgment: Judgment is impulsive and inappropriate.       Recent Labs     24  1247 24  0142 24  0146   SODIUM 152* 152* 145   POTASSIUM 4.2  4.2 3.9   CHLORIDE 119* 117* 111   CO2 25 23 24   GLUCOSE 125* 93 104*   BUN 35* 35* 25*   CREATININE 1.21 1.25 1.13   CALCIUM 8.6 9.0 8.6     Recent Labs     04/02/24  0142 04/03/24  0146   WBC 10.4 9.1   RBC 4.55* 4.08*   HEMOGLOBIN 13.5* 12.3*   HEMATOCRIT 42.7 37.5*   MCV 93.8 91.9   MCH 29.7 30.1   MCHC 31.6* 32.8   RDW 57.3* 52.9*   PLATELETCT 250 231   MPV 11.6 11.3       ASSESSMENT/PLAN WITH SHARED DECISION MAKING:   Review  Pertinent imaging reviewed.    PHYSICAL ASPECTS OF CARE  Palliative Performance Scale: 30% currently, unknown previous PPS    # Hypertensive crisis  # Hypernatremia  # Ileus  # Multiple lacunar infarcts  # Functional debility  # Bacteremia  #Acute kidney injury  #Acute metabolic encephalopathy  #HFrEF  #Acute hypoxic respiratory failure  #Positive urine drug screen-methamphetamines  #Essential hypertension  #Delirium/frailty/geriatric syndrome  #Comfort Care-pain/possible terminal agitation  -morphine 5 mg IV every 4 hours scheduled; lorazepam 1 mg IV every 4 hours scheduled  -discussed with RN    SOCIAL ASPECTS OF CARE  All information obtained from daughter Cheyenne.  Patient resides alone and was found down covered in his own feces for unknown number of days.  Daughter states that he is a former  and may be had a [employer] business.  He was born in [place] and grew up in [place].  He had 2 daughters 1 of whom is passed away.  Cheyenne states that she has been estranged from her dad given his behaviors.  She states up until years ago his life was fairly normal and then he had a back surgery.  She states after that time he had difficulties with pain and often times overmedicated or self medicated.  Patient also has a brother who lives in [place] whose name is Ruchi Sanchez at [phone].  Unknown level of ADL dependence prior to hospital stay.    SPIRITUAL ASPECTS OF CARE   Daughter states that patient is a Roman Catholic and believes firmly in Jerrell.    GOALS OF CARE/SERIOUS  ILLNESS CONVERSATION  Patient seen at bedside with nursing staff.  He is agitated, does not follow any commands, does not make eye contact, is noncommunicative other than groaning with any movement.  Assisted with repositioning patient.  He pushes against staff when turning onto his right side.  He seems to be weaker on his right side.    Telephone call placed to Cheyenne Barlow, patient's daughter.  Background information obtained from Cheyenne.  Cheyenne relays story her uncle told her regarding recent visit with patient prior to hospital stay.  She reports that patient told his brother Buddy that he was tired of this and ready to meet Jerrell.  Explored understanding of patient's current condition to which she has good understanding.  Updated her on current status of tube feeding and post possibility patient would need permanent tube.  Discussed and introduced philosophy of hospice and the patient would qualify for hospice given strokes and current level of debility.  As such, discussed CODE STATUS.  Given current state, functional status, altered mentation, recommend patient be DNR/DNI.  Cheyenne agrees with this and agrees to transition patient to DNR/DNI.  Cheyenne provides provider with uncle's name and phone number.  Additionally, she gives permission for provider to contact on-call to discuss patient's condition.  Cheyenne understands that it will be up to her moving forward to make decisions regarding her dad's care as she is legally recognized surrogate decision maker.    4/3/2024: TC placed to patient's brotherJarrell yesterday w/o answer; left message. TC placed today to Cheyenne, patient's daughter. She has agreed to transition patient to comfort care with hospice DC plan. Discussed possibility of patient dying in the hospital. She verbalizes understanding of this. She is agreeable to plan. Communicated same to team.     4/4/2024: Patient is seen this morning, he has evident facial grimace, diaphoresis and  is groaning loudly. Will place on scheduled morphine and lorazepam.     Code Status: DNR/DNI    ACP Documents: Will need POLST prior to discharge     minutes spent discussing advance care planning, this time excludes any other billed services.    I spent a total of 29 minutes reviewing medical records, direct face-to-face time with the patient and/or family, documentation and coordination of care. This is separate from the time spent on advance care planning, which is documented above.    Lindsay Au, MSN, APRN, ACNPC-AG.  Palliative Care Nurse Practitioner  [phone]

## 2024-04-04 NOTE — CARE PLAN
The patient is Stable - Low risk of patient condition declining or worsening    Shift Goals  Clinical Goals: comfort care, pain control  Patient Goals: POLINA  Family Goals: n/a    Progress made toward(s) clinical / shift goals:  Comfort provided.  Pain managed with scheduled medications.       Problem: Knowledge Deficit - Standard  Goal: Patient and family/care givers will demonstrate understanding of plan of care, disease process/condition, diagnostic tests and medications  Description: Target End Date:  1-3 days or as soon as patient condition allows    Document in Patient Education    1.  Patient and family/caregiver oriented to unit, equipment, visitation policy and means for communicating concern  2.  Complete/review Learning Assessment  3.  Assess knowledge level of disease process/condition, treatment plan, diagnostic tests and medications  4.  Explain disease process/condition, treatment plan, diagnostic tests and medications  Outcome: Progressing     Problem: Pain - Standard  Goal: Alleviation of pain or a reduction in pain to the patient’s comfort goal  Description: Target End Date:  Prior to discharge or change in level of care    Document on Vitals flowsheet    1.  Document pain using the appropriate pain scale per order or unit policy  2.  Educate and implement non-pharmacologic comfort measures (i.e. relaxation, distraction, massage, cold/heat therapy, etc.)  3.  Pain management medications as ordered  4.  Reassess pain after pain med administration per policy  5.  If opiods administered assess patient's response to pain medication is appropriate per POSS sedation scale  6.  Follow pain management plan developed in collaboration with patient and interdisciplinary team (including palliative care or pain specialists if applicable)  Outcome: Progressing     Problem: Skin Integrity  Goal: Skin integrity is maintained or improved  Description: Target End Date:  Prior to discharge or change in level of  care    Document interventions on Skin Risk/Shadi flowsheet groups and corresponding LDA    1.  Assess and monitor skin integrity, appearance and/or temperature  2.  Assess risk factors for impaired skin integrity and/or pressures ulcers  3.  Implement precautions to protect skin integrity in collaboration with interdisciplinary team  4.  Implement pressure ulcer prevention protocol if at risk for skin breakdown  5.  Confirm wound care consult if at risk for skin breakdown  6.  Ensure patient use of pressure relieving devices  (Low air loss bed, waffle overlay, heel protectors, ROHO cushion, etc)  Outcome: Progressing

## 2024-04-04 NOTE — PROGRESS NOTES
Report received from previous shift RN.  Assessment complete.   Patient is nonverbal, only moans. Patient on comfort care.   Patient resting in bed comfortably, even and unlabored breathing present.  All needs met at this time. Call light within reach. Hourly rounding in place.

## 2024-04-05 VITALS
OXYGEN SATURATION: 49 % | DIASTOLIC BLOOD PRESSURE: 116 MMHG | WEIGHT: 225.97 LBS | BODY MASS INDEX: 32.35 KG/M2 | TEMPERATURE: 97.2 F | HEIGHT: 70 IN | SYSTOLIC BLOOD PRESSURE: 226 MMHG | RESPIRATION RATE: 18 BRPM | HEART RATE: 97 BPM

## 2024-04-05 PROCEDURE — 700111 HCHG RX REV CODE 636 W/ 250 OVERRIDE (IP): Performed by: NURSE PRACTITIONER

## 2024-04-05 RX ADMIN — LORAZEPAM 1 MG: 2 INJECTION INTRAMUSCULAR; INTRAVENOUS at 01:38

## 2024-04-05 RX ADMIN — LORAZEPAM 1 MG: 2 INJECTION INTRAMUSCULAR; INTRAVENOUS at 05:33

## 2024-04-05 RX ADMIN — Medication 50 MG: at 05:40

## 2024-04-05 NOTE — PROGRESS NOTES
4 Eyes Skin Assessment Completed by RIGOBERTO Daugherty and RIGOBERTO Manning.    Head WDL  Ears R ear incision with sutures  Nose WDL  Mouth WDL  Neck WDL  Breast/Chest WDL  Shoulder Blades Redness and Blanching  Spine Redness and Blanching  (R) Arm/Elbow/Hand Swelling and Discoloration  (L) Arm/Elbow/Hand Swelling and Discoloration  Abdomen Bruising  Groin Redness, Skin tear to penis  Scrotum/Coccyx/Buttocks WDL  (R) Leg Swelling and Discoloration  (L) Leg Swelling and Discoloration  (R) Heel/Foot/Toe Discoloration and Calloused  (L) Heel/Foot/Toe Discoloration and Calloused          Devices In Places Blood Pressure Cuff, Pulse Ox, and Ortho Boot/Shoe, SCDs      Interventions In Place Heel Float Boots, TAP System, Pillows, Q2 Turns, Low Air Loss Mattress, and Barrier Cream    Possible Skin Injury No    Pictures Uploaded Into Epic N/A  Wound Consult Placed N/A  RN Wound Prevention Protocol Ordered No

## 2024-04-05 NOTE — PROGRESS NOTES
Report received from previous shift RN.  Assessment complete. Patient on comfort care.  Patient resting in bed comfortably, even and unlabored breathing present.  Morphine gtt running per MAR.  All needs met at this time. Call light within reach. Hourly rounding in place.

## 2024-04-05 NOTE — DISCHARGE SUMMARY
Death Summary    Cause of Death  Respiratory failure due to CVA due to unknown etiology     Comorbid Conditions at the Time of Death  Principal Problem:    Hypertensive crisis (POA: Yes)  Active Problems:    Essential hypertension (POA: Yes)    Positive urine drug screen (POA: Yes)      Overview: +UDS for methamphetamine x2 drug screens       Patient is NOT to have any further controlled substances from [facility]     Acute hypoxic respiratory failure (HCC) (POA: Unknown)    HFrEF (heart failure with reduced ejection fraction) (HCC) (POA: Unknown)    Elevated troponin (POA: Unknown)    Acute metabolic encephalopathy (POA: Unknown)    FRANKLYN (acute kidney injury) (HCC) (POA: Unknown)    Hypokalemia (POA: Unknown)    Hypophosphatemia (POA: Unknown)    Abnormal imaging of central nervous system (POA: Unknown)    Prolonged Q-T interval on ECG (POA: Unknown)    Bacteremia due to Gram-negative bacteria (POA: Unknown)    Debility (POA: Unknown)    Multiple lacunar infarcts (HCC) (POA: Unknown)    Ileus (HCC) (POA: Unknown)    Hypernatremia (POA: Unknown)    Advance care planning (POA: Unknown)  Resolved Problems:    * No resolved hospital problems. *      History of Presenting Illness and Hospital Course  This was a 64 y.o. male with past medical history of hypertension hypercholesterolemia who presented 3/24/2024 after he found down for an unknown amount of 9 and he was covered in feces. Patient found to have significant elevated blood pressure and as per the chart review the blood pressure was 226/143 on upon arrival to [facility]. He was diagnosed with hypertensive crisis, hypoxic respiratory failure, acute encephalopathy and acute kidney injury. On imaging studies patient found to have 1.2 mm left frontal hyperdensity. He underwent MRI brain that showed diffuse confluent FLAIR. He was placed on Precedex gtt. for ongoing agitation and also he was placed on nicardipine infusion for uncontrolled hypertension.   Patient also found to  have HF with Ef of 25%   His condition continued to deteriorate and no improvements, patient continued to be encephalopathic . He was on tube feed.   Palliative team was consulted and had discussion with his daughter after long discussion decision was made to transition patient to comfort care and hospice.  Patient passed away today 4/5/24 at 10: 19 AM      Death Date: 04/05/24   Death Time: 1019         Pronounced By (RN1): stacy dalton  Pronounced By (RN2): Siara Freyer

## 2024-04-05 NOTE — CARE PLAN
Problem: Pain - Standard  Goal: Alleviation of pain or a reduction in pain to the patient’s comfort goal  Outcome: Progressing     Problem: Skin Integrity  Goal: Skin integrity is maintained or improved  Outcome: Progressing     Problem: Venous Thromboembolism (VTE) Prevention  Goal: The patient will remain free from venous thromboembolism (VTE)  Outcome: Progressing   The patient is Stable - Low risk of patient condition declining or worsening    Shift Goals  Clinical Goals: comfort care, pain control, skin integrity  Patient Goals: POLINA  Family Goals: n/a    Progress made toward(s) clinical / shift goals:  Nonverbal scale used to assess pain. IV morphine drip maintained. Pt sleeping comfortably with unlabored breathing. Q2 turns in place. Barrier cream applied to groin.     Patient is not progressing towards the following goals:

## 2024-04-05 NOTE — PROGRESS NOTES
Assumed care of patient at 1900. Bedside report received. Assessment complete.    Patient is on comfort care.  A&O x0. Patient unresponsive to voice. Pt does not call appropriately.  Nonverbal pain scale used. Pt is restless and groaning. Pain managed with prescribed medications per MAR.   NPO diet.  + void. - BM. Last BM PTA.  Skin per flowsheets  Mobility: bed bound  SCDs on.     All needs met at this time. Call light and belongings within reach. Fall precautions in place. Hourly rounding in place.
